# Patient Record
Sex: FEMALE | Race: WHITE | NOT HISPANIC OR LATINO | Employment: OTHER | URBAN - NONMETROPOLITAN AREA
[De-identification: names, ages, dates, MRNs, and addresses within clinical notes are randomized per-mention and may not be internally consistent; named-entity substitution may affect disease eponyms.]

---

## 2019-05-01 ENCOUNTER — OFFICE VISIT (OUTPATIENT)
Dept: PHARMACY | Facility: HOSPITAL | Age: 59
End: 2019-05-01

## 2019-05-01 VITALS
WEIGHT: 124 LBS | HEIGHT: 67 IN | OXYGEN SATURATION: 95 % | DIASTOLIC BLOOD PRESSURE: 67 MMHG | HEART RATE: 77 BPM | SYSTOLIC BLOOD PRESSURE: 98 MMHG | BODY MASS INDEX: 19.46 KG/M2

## 2019-05-01 DIAGNOSIS — I85.11 SECONDARY ESOPHAGEAL VARICES WITH BLEEDING (HCC): ICD-10-CM

## 2019-05-01 DIAGNOSIS — B18.2 CHRONIC HEPATITIS C WITHOUT HEPATIC COMA (HCC): Primary | ICD-10-CM

## 2019-05-01 DIAGNOSIS — K44.9 HIATAL HERNIA: ICD-10-CM

## 2019-05-01 LAB
ALBUMIN SERPL-MCNC: 4.2 G/DL (ref 3.5–5.2)
ALBUMIN/GLOB SERPL: 1 G/DL
ALP SERPL-CCNC: 109 U/L (ref 39–117)
ALPHA-FETOPROTEIN: 4.15 NG/ML (ref 0–8.3)
ALT SERPL W P-5'-P-CCNC: 27 U/L (ref 1–33)
ANION GAP SERPL CALCULATED.3IONS-SCNC: 10.6 MMOL/L
AST SERPL-CCNC: 36 U/L (ref 1–32)
BILIRUB SERPL-MCNC: 0.4 MG/DL (ref 0.2–1.2)
BUN BLD-MCNC: 8 MG/DL (ref 8–23)
BUN/CREAT SERPL: 15.1 (ref 7–25)
CALCIUM SPEC-SCNC: 9.5 MG/DL (ref 8.6–10.5)
CHLORIDE SERPL-SCNC: 103 MMOL/L (ref 98–107)
CO2 SERPL-SCNC: 25.4 MMOL/L (ref 22–29)
CREAT BLD-MCNC: 0.53 MG/DL (ref 0.57–1)
DEPRECATED RDW RBC AUTO: 50 FL (ref 37–54)
ERYTHROCYTE [DISTWIDTH] IN BLOOD BY AUTOMATED COUNT: 13.2 % (ref 12.3–15.4)
GFR SERPL CREATININE-BSD FRML MDRD: 117 ML/MIN/1.73
GLOBULIN UR ELPH-MCNC: 4.1 GM/DL
GLUCOSE BLD-MCNC: 88 MG/DL (ref 65–99)
HBV SURFACE AB SER RIA-ACNC: NORMAL
HCT VFR BLD AUTO: 47.1 % (ref 34–46.6)
HGB BLD-MCNC: 15.1 G/DL (ref 12–15.9)
HIV1+2 AB SER QL: NORMAL
INR PPP: 1.13 (ref 0.9–1.1)
MCH RBC QN AUTO: 32.8 PG (ref 26.6–33)
MCHC RBC AUTO-ENTMCNC: 32.1 G/DL (ref 31.5–35.7)
MCV RBC AUTO: 102.2 FL (ref 79–97)
PLATELET # BLD AUTO: 146 10*3/MM3 (ref 140–450)
PMV BLD AUTO: 10.1 FL (ref 6–12)
POTASSIUM BLD-SCNC: 3.7 MMOL/L (ref 3.5–5.2)
PROT SERPL-MCNC: 8.3 G/DL (ref 6–8.5)
PROTHROMBIN TIME: 14.7 SECONDS (ref 11–15.4)
RBC # BLD AUTO: 4.61 10*6/MM3 (ref 3.77–5.28)
SODIUM BLD-SCNC: 139 MMOL/L (ref 136–145)
WBC NRBC COR # BLD: 6.49 10*3/MM3 (ref 3.4–10.8)

## 2019-05-01 PROCEDURE — 82105 ALPHA-FETOPROTEIN SERUM: CPT

## 2019-05-01 PROCEDURE — 86704 HEP B CORE ANTIBODY TOTAL: CPT

## 2019-05-01 PROCEDURE — 99204 OFFICE O/P NEW MOD 45 MIN: CPT | Performed by: PHYSICIAN ASSISTANT

## 2019-05-01 PROCEDURE — 85027 COMPLETE CBC AUTOMATED: CPT

## 2019-05-01 PROCEDURE — 85610 PROTHROMBIN TIME: CPT

## 2019-05-01 PROCEDURE — 87522 HEPATITIS C REVRS TRNSCRPJ: CPT

## 2019-05-01 PROCEDURE — 86708 HEPATITIS A ANTIBODY: CPT

## 2019-05-01 PROCEDURE — 80053 COMPREHEN METABOLIC PANEL: CPT

## 2019-05-01 PROCEDURE — 86706 HEP B SURFACE ANTIBODY: CPT

## 2019-05-01 PROCEDURE — 36415 COLL VENOUS BLD VENIPUNCTURE: CPT

## 2019-05-01 PROCEDURE — G0432 EIA HIV-1/HIV-2 SCREEN: HCPCS

## 2019-05-01 RX ORDER — LISINOPRIL AND HYDROCHLOROTHIAZIDE 20; 12.5 MG/1; MG/1
1 TABLET ORAL DAILY
COMMUNITY

## 2019-05-01 RX ORDER — BENZONATATE 100 MG/1
100 CAPSULE ORAL 3 TIMES DAILY PRN
COMMUNITY
End: 2020-11-10

## 2019-05-01 RX ORDER — BUDESONIDE AND FORMOTEROL FUMARATE DIHYDRATE 160; 4.5 UG/1; UG/1
2 AEROSOL RESPIRATORY (INHALATION)
COMMUNITY
End: 2020-11-10
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
## 2019-05-02 LAB
HAV AB SER QL IA: NEGATIVE
HBV CORE AB SER DONR QL IA: NEGATIVE

## 2019-05-03 LAB
HCV RNA SERPL NAA+PROBE-ACNC: NORMAL IU/ML
TEST INFORMATION: NORMAL

## 2019-05-05 LAB
HCV GENTYP SERPL NAA+PROBE: NORMAL
HCV RNA SERPL NAA+PROBE-ACNC: NORMAL IU/ML
HCV RNA SERPL NAA+PROBE-LOG IU: NORMAL LOG10 IU/ML
REF LAB TEST REF RANGE: NORMAL

## 2019-05-08 ENCOUNTER — OFFICE VISIT (OUTPATIENT)
Dept: PHARMACY | Facility: HOSPITAL | Age: 59
End: 2019-05-08

## 2019-05-08 ENCOUNTER — HOSPITAL ENCOUNTER (OUTPATIENT)
Dept: ULTRASOUND IMAGING | Facility: HOSPITAL | Age: 59
Discharge: HOME | End: 2019-05-08
Admitting: PHYSICIAN ASSISTANT

## 2019-05-08 VITALS
DIASTOLIC BLOOD PRESSURE: 73 MMHG | HEIGHT: 67 IN | SYSTOLIC BLOOD PRESSURE: 109 MMHG | BODY MASS INDEX: 19.46 KG/M2 | HEART RATE: 73 BPM | WEIGHT: 124 LBS | OXYGEN SATURATION: 92 %

## 2019-05-08 DIAGNOSIS — Z86.19 HISTORY OF HEPATITIS C: Primary | ICD-10-CM

## 2019-05-08 DIAGNOSIS — I85.11 SECONDARY ESOPHAGEAL VARICES WITH BLEEDING (HCC): ICD-10-CM

## 2019-05-08 DIAGNOSIS — Z87.19 HISTORY OF ESOPHAGEAL VARICES: ICD-10-CM

## 2019-05-08 DIAGNOSIS — Z87.19 HISTORY OF CIRRHOSIS OF LIVER: ICD-10-CM

## 2019-05-08 DIAGNOSIS — B18.2 CHRONIC HEPATITIS C WITHOUT HEPATIC COMA (HCC): ICD-10-CM

## 2019-05-08 PROCEDURE — 99214 OFFICE O/P EST MOD 30 MIN: CPT | Performed by: PHYSICIAN ASSISTANT

## 2019-05-08 PROCEDURE — 76705 ECHO EXAM OF ABDOMEN: CPT

## 2019-05-08 PROCEDURE — 76705 ECHO EXAM OF ABDOMEN: CPT | Performed by: RADIOLOGY

## 2019-08-07 ENCOUNTER — LAB (OUTPATIENT)
Dept: PHARMACY | Facility: HOSPITAL | Age: 59
End: 2019-08-07

## 2019-08-07 DIAGNOSIS — B18.2 CHRONIC HEPATITIS C WITH HEPATIC COMA (HCC): Primary | ICD-10-CM

## 2019-08-07 LAB
ALBUMIN SERPL-MCNC: 4.1 G/DL (ref 3.5–5.2)
ALBUMIN/GLOB SERPL: 1.2 G/DL
ALP SERPL-CCNC: 124 U/L (ref 39–117)
ALT SERPL W P-5'-P-CCNC: 25 U/L (ref 1–33)
ANION GAP SERPL CALCULATED.3IONS-SCNC: 13.5 MMOL/L (ref 5–15)
AST SERPL-CCNC: 33 U/L (ref 1–32)
BILIRUB SERPL-MCNC: 0.9 MG/DL (ref 0.2–1.2)
BUN BLD-MCNC: 10 MG/DL (ref 8–23)
BUN/CREAT SERPL: 16.7 (ref 7–25)
CALCIUM SPEC-SCNC: 9.9 MG/DL (ref 8.6–10.5)
CHLORIDE SERPL-SCNC: 103 MMOL/L (ref 98–107)
CO2 SERPL-SCNC: 22.5 MMOL/L (ref 22–29)
CREAT BLD-MCNC: 0.6 MG/DL (ref 0.57–1)
GFR SERPL CREATININE-BSD FRML MDRD: 102 ML/MIN/1.73
GLOBULIN UR ELPH-MCNC: 3.5 GM/DL
GLUCOSE BLD-MCNC: 85 MG/DL (ref 65–99)
POTASSIUM BLD-SCNC: 3.7 MMOL/L (ref 3.5–5.2)
PROT SERPL-MCNC: 7.6 G/DL (ref 6–8.5)
SODIUM BLD-SCNC: 139 MMOL/L (ref 136–145)

## 2019-08-07 PROCEDURE — 87522 HEPATITIS C REVRS TRNSCRPJ: CPT

## 2019-08-07 PROCEDURE — 36415 COLL VENOUS BLD VENIPUNCTURE: CPT

## 2019-08-07 PROCEDURE — 80053 COMPREHEN METABOLIC PANEL: CPT

## 2019-08-09 LAB
HCV RNA SERPL NAA+PROBE-ACNC: NORMAL IU/ML
TEST INFORMATION: NORMAL

## 2019-08-21 ENCOUNTER — OFFICE VISIT (OUTPATIENT)
Dept: PHARMACY | Facility: HOSPITAL | Age: 59
End: 2019-08-21

## 2019-08-21 VITALS
OXYGEN SATURATION: 92 % | HEART RATE: 84 BPM | HEIGHT: 67 IN | DIASTOLIC BLOOD PRESSURE: 73 MMHG | BODY MASS INDEX: 20.4 KG/M2 | SYSTOLIC BLOOD PRESSURE: 120 MMHG | WEIGHT: 130 LBS

## 2019-08-21 DIAGNOSIS — Z23 NEED FOR IMMUNIZATION AGAINST VIRAL HEPATITIS: ICD-10-CM

## 2019-08-21 DIAGNOSIS — K74.60 CIRRHOSIS OF LIVER WITHOUT ASCITES, UNSPECIFIED HEPATIC CIRRHOSIS TYPE (HCC): ICD-10-CM

## 2019-08-21 DIAGNOSIS — Z86.19 HISTORY OF HEPATITIS C: Primary | ICD-10-CM

## 2019-08-21 PROCEDURE — 99213 OFFICE O/P EST LOW 20 MIN: CPT | Performed by: PHYSICIAN ASSISTANT

## 2019-08-21 RX ORDER — OMEPRAZOLE 40 MG/1
40 CAPSULE, DELAYED RELEASE ORAL DAILY
COMMUNITY
End: 2020-11-10
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
## 2020-11-10 ENCOUNTER — OFFICE VISIT (OUTPATIENT)
Dept: SURGERY | Facility: CLINIC | Age: 60
End: 2020-11-10

## 2020-11-10 ENCOUNTER — LAB (OUTPATIENT)
Dept: LAB | Facility: HOSPITAL | Age: 60
End: 2020-11-10

## 2020-11-10 VITALS
DIASTOLIC BLOOD PRESSURE: 73 MMHG | SYSTOLIC BLOOD PRESSURE: 112 MMHG | HEIGHT: 67 IN | BODY MASS INDEX: 21.66 KG/M2 | WEIGHT: 138 LBS | HEART RATE: 79 BPM

## 2020-11-10 DIAGNOSIS — R10.9 ABDOMINAL PAIN, UNSPECIFIED ABDOMINAL LOCATION: Primary | ICD-10-CM

## 2020-11-10 DIAGNOSIS — R10.9 ABDOMINAL PAIN, UNSPECIFIED ABDOMINAL LOCATION: ICD-10-CM

## 2020-11-10 PROCEDURE — 99243 OFF/OP CNSLTJ NEW/EST LOW 30: CPT | Performed by: SURGERY

## 2020-11-10 PROCEDURE — 36415 COLL VENOUS BLD VENIPUNCTURE: CPT

## 2020-11-10 PROCEDURE — 86677 HELICOBACTER PYLORI ANTIBODY: CPT

## 2020-11-10 RX ORDER — CYCLOBENZAPRINE HCL 5 MG
5 TABLET ORAL 3 TIMES DAILY PRN
COMMUNITY

## 2020-11-10 RX ORDER — FAMOTIDINE 40 MG/1
40 TABLET, FILM COATED ORAL DAILY
COMMUNITY

## 2020-11-10 RX ORDER — AMOXICILLIN 250 MG
2 CAPSULE ORAL DAILY PRN
Qty: 30 TABLET | Refills: 1 | Status: SHIPPED | OUTPATIENT
Start: 2020-11-10 | End: 2021-11-10

## 2020-11-10 RX ORDER — ERGOCALCIFEROL 1.25 MG/1
50000 CAPSULE ORAL WEEKLY
COMMUNITY

## 2020-11-10 RX ORDER — PANTOPRAZOLE SODIUM 40 MG/1
40 TABLET, DELAYED RELEASE ORAL DAILY
Qty: 30 TABLET | Refills: 1 | Status: SHIPPED | OUTPATIENT
Start: 2020-11-10 | End: 2021-11-10

## 2020-11-10 NOTE — PROGRESS NOTES
Subjective   Noemy Brooks is a 60 y.o. female is being seen for consultation today at the request of Darlene Merlos APRN    Noemy Brooks is a 60 y.o. female With history of cirrhosis secondary to Hepatitis C.  She has chronic substernal pain worse after fatty meals.  She has compensated liver failure.  The patient pain is worse with fatty and greasy foods.  Recent excessive NSAID use for headache.  Chronic constipation reported.      Past Medical History:   Diagnosis Date   • Cancer (CMS/HCC)    • COPD (chronic obstructive pulmonary disease) (CMS/HCC)    • Hypertension    • Infectious viral hepatitis    • Ovarian cancer (CMS/HCC)        Family History   Problem Relation Age of Onset   • COPD Mother    • Throat cancer Mother    • Liver cancer Father        Social History     Socioeconomic History   • Marital status:      Spouse name: Not on file   • Number of children: 3   • Years of education: Not on file   • Highest education level: Not on file   Tobacco Use   • Smoking status: Former Smoker     Quit date: 2015     Years since quittin.5   • Smokeless tobacco: Never Used   • Tobacco comment: smoked for approx 20 years, 1.5 to 2 ppd   Substance and Sexual Activity   • Alcohol use: No     Frequency: Never     Comment: she previously drank daily for 16 years, beer   • Drug use: No   • Sexual activity: Defer       Past Surgical History:   Procedure Laterality Date   • COLONOSCOPY     • HYSTERECTOMY     • UPPER GASTROINTESTINAL ENDOSCOPY         Review of Systems   Constitutional: Negative for activity change, appetite change, chills and fever.   HENT: Negative for sore throat and trouble swallowing.    Eyes: Negative for visual disturbance.   Respiratory: Negative for cough and shortness of breath.    Cardiovascular: Negative for chest pain and palpitations.   Gastrointestinal: Positive for abdominal pain. Negative for abdominal distention, blood in stool, constipation, diarrhea, nausea and vomiting.  "  Endocrine: Negative for cold intolerance and heat intolerance.   Genitourinary: Negative for dysuria.   Musculoskeletal: Negative for joint swelling.   Skin: Negative for color change, rash and wound.   Allergic/Immunologic: Negative for immunocompromised state.   Neurological: Positive for headaches. Negative for dizziness, seizures and weakness.   Hematological: Negative for adenopathy. Does not bruise/bleed easily.   Psychiatric/Behavioral: Negative for agitation and confusion.         /73   Pulse 79   Ht 170.2 cm (67.01\")   Wt 62.6 kg (138 lb)   BMI 21.61 kg/m²   Objective   Physical Exam  Constitutional:       Appearance: She is well-developed.   HENT:      Head: Normocephalic and atraumatic.   Eyes:      Conjunctiva/sclera: Conjunctivae normal.      Pupils: Pupils are equal, round, and reactive to light.   Neck:      Musculoskeletal: Neck supple.      Thyroid: No thyromegaly.      Vascular: No JVD.      Trachea: No tracheal deviation.   Cardiovascular:      Rate and Rhythm: Normal rate and regular rhythm.      Heart sounds: No murmur. No friction rub. No gallop.    Pulmonary:      Effort: Pulmonary effort is normal.      Breath sounds: Normal breath sounds.   Abdominal:      General: There is no distension.      Palpations: Abdomen is soft. There is no hepatomegaly or splenomegaly.      Tenderness: There is no abdominal tenderness.      Hernia: No hernia is present.   Musculoskeletal: Normal range of motion.         General: No deformity.   Skin:     General: Skin is warm and dry.   Neurological:      Mental Status: She is alert and oriented to person, place, and time.               Assessment   Diagnoses and all orders for this visit:    1. Abdominal pain, unspecified abdominal location (Primary)  -     Helicobacter Pylori, IgA IgG IgM; Future    Other orders  -     SCANNED - IMAGING  -     pantoprazole (Protonix) 40 MG EC tablet; Take 1 tablet by mouth Daily.  Dispense: 30 tablet; Refill: 1  -    "  sennosides-docusate (PERICOLACE) 8.6-50 MG per tablet; Take 2 tablets by mouth Daily As Needed for Constipation.  Dispense: 30 tablet; Refill: 1      Noemy Brooks is a 60 y.o. female with abdominal pain and biliary dyskinesia.  Patient will initiate PPI therapy and be evaluated for H. Pylori.  She will also begin stool softeners.  Due to underlying cirrhosis elective cholecystectomy will be avoided for now.  She is aware of symptoms that should prompt return to care.  She will also limit NSAID use.    Patient's Body mass index is 21.61 kg/m². BMI is within normal parameters. No follow-up required..

## 2020-11-11 LAB
H PYLORI IGA SER-ACNC: <9 UNITS (ref 0–8.9)
H PYLORI IGG SER IA-ACNC: 1.16 INDEX VALUE (ref 0–0.79)
H PYLORI IGM SER-ACNC: <9 UNITS (ref 0–8.9)

## 2023-03-30 ENCOUNTER — TELEPHONE (OUTPATIENT)
Dept: ONCOLOGY | Facility: CLINIC | Age: 63
End: 2023-03-30

## 2023-03-30 NOTE — TELEPHONE ENCOUNTER
Caller: Papo Brooks    Relationship: Self    Best call back number:[phone]    What is the best time to reach you:ANYTIME. LEAVE VM    Who are you requesting to speak with (clinical staff, provider,  specific staff member): TRANSPORTATION FORMS        What was the call regarding: PATIENT PAPO CALLED TO REQUEST THAT THE OFFICE SIGN A LETTER WITH CONIFRMATION TO HER TRANPORATION SERVICE FOR HER APPT ON April 11TH. SHE SAID THEY NEED A 3 DAY WINDOW OR THEY WON'T BE ABLE TO SCHEDULE HER. PATIENT STATES THAT THE TRANSPORTATION SERVICE IS OR HAS SENT THEM A LETTER TO BE SIGNED. SHE STATED IT SHOULD BE COMING THROUGH FAX. ITS A PRE REGISTRATION.     Do you require a callback: YES

## 2023-04-11 ENCOUNTER — CONSULT (OUTPATIENT)
Dept: ONCOLOGY | Facility: CLINIC | Age: 63
End: 2023-04-11
Payer: MEDICARE

## 2023-04-11 VITALS
RESPIRATION RATE: 18 BRPM | OXYGEN SATURATION: 93 % | HEIGHT: 67 IN | TEMPERATURE: 97.8 F | BODY MASS INDEX: 20.56 KG/M2 | HEART RATE: 71 BPM | SYSTOLIC BLOOD PRESSURE: 156 MMHG | WEIGHT: 131 LBS | DIASTOLIC BLOOD PRESSURE: 79 MMHG

## 2023-04-11 DIAGNOSIS — C22.0 HEPATOCELLULAR CARCINOMA: Primary | ICD-10-CM

## 2023-04-11 PROCEDURE — 99205 OFFICE O/P NEW HI 60 MIN: CPT | Performed by: INTERNAL MEDICINE

## 2023-04-11 PROCEDURE — 1126F AMNT PAIN NOTED NONE PRSNT: CPT | Performed by: INTERNAL MEDICINE

## 2023-04-11 RX ORDER — MELATONIN
1000 DAILY
COMMUNITY

## 2023-04-11 RX ORDER — TRAZODONE HYDROCHLORIDE 100 MG/1
100 TABLET ORAL NIGHTLY
COMMUNITY

## 2023-04-11 RX ORDER — LISINOPRIL 30 MG/1
30 TABLET ORAL DAILY
COMMUNITY

## 2023-04-11 RX ORDER — BUDESONIDE AND FORMOTEROL FUMARATE DIHYDRATE 160; 4.5 UG/1; UG/1
2 AEROSOL RESPIRATORY (INHALATION)
COMMUNITY

## 2023-04-11 RX ORDER — PANTOPRAZOLE SODIUM 40 MG/1
40 TABLET, DELAYED RELEASE ORAL DAILY
COMMUNITY

## 2023-04-11 NOTE — PROGRESS NOTES
Name:  Noemy Brooks  :  1960  Date:  2023     REFERRING PHYSICIAN  Henri Melvin MD    PRIMARY CARE PHYSICIAN  Lynn, Haley Roca DO    REASON FOR CONSULTATION  1. Hepatocellular carcinoma      CHIEF COMPLAINT  None.    Dear Dr. Bailey,    HISTORY OF PRESENT ILLNESS:   I saw Ms. Brooks in initial consultation today in our medical oncology clinic. As you are aware, she is a pleasant, 63 y.o., white female with a history of hypertension, hepatitis C (treated with antivirals and reportedly cured in ~), cirrhosis and hepatocellular carcinoma who was initially diagnosed with the latter in ~2022. [place], [place]. Her [place] gastroenterologist referred her to  at that time once she was found to have a couple of liver masses and a serum AFP > 1,000 ng/mL. She was evaluated by the liver transplant service; however, due to the HCC's involvement of the hepatic vessels, she was felt to not be a candidate (for transplant). She was subsequently evaluated by  medical oncology, who recommended starting first-line, palliative treatment with a combination of g3gfieak bevacizumab (Mvasi) and atezolizumab (Tecentriq), per the current standard of care. She received a total of eight (8) cycles between 2022 and early 2023 through the [facility]. Due to transportation issues (her sister has to be the one to drive her, and the trips to [place] have been getting increasingly difficult for her), she now presents to our clinic in order to transfer her ongoing, oncologic care to us, closer to her home in [place], [place].    INTERIM HISTORY:  Ms. Brooks presents to clinic today for initial consultation by herself. She confirmed the above history. She has tolerated the eight (8), c3hlnrhx cycles of bevacizumab/atezolizumab she has received to date overall very well, without any noticeable side effects. She states that the Lord cured her once before (of Hepatitis C), and she has recently become  convinced that he has/will do it again (this time for her HCC). Consequently, she is not entirely certain if she is agreeable to continuing this treatment regimen. She has no current complaints, including pain.    Past Medical History:   Diagnosis Date   • Acid reflux    • Cancer    • Cirrhosis    • COPD (chronic obstructive pulmonary disease)    • Depression    • High cholesterol    • Hypertension    • Infectious viral hepatitis    • Ovarian cancer        Past Surgical History:   Procedure Laterality Date   • APPENDECTOMY N/A    • COLONOSCOPY     • HYSTERECTOMY     • UPPER GASTROINTESTINAL ENDOSCOPY         Social History     Socioeconomic History   • Marital status:    • Number of children: 3   Tobacco Use   • Smoking status: Former     Types: Cigarettes     Quit date: 2015     Years since quittin.9   • Smokeless tobacco: Never   • Tobacco comments:     smoked for approx 20 years, 1.5 to 2 ppd   Vaping Use   • Vaping Use: Never used   Substance and Sexual Activity   • Alcohol use: No     Comment: she previously drank daily for 16 years, beer   • Drug use: No   • Sexual activity: Defer       Family History   Problem Relation Age of Onset   • Stroke Mother    • Hypertension Mother    • COPD Mother    • Throat cancer Mother    • Arthritis Mother    • Asthma Mother    • Heart attack Mother    • Stroke Father    • Cancer Father    • Liver cancer Father        Allergies   Allergen Reactions   • Codeine GI Intolerance       Current Outpatient Medications   Medication Sig Dispense Refill   • budesonide-formoterol (SYMBICORT) 160-4.5 MCG/ACT inhaler Inhale 2 puffs 2 (Two) Times a Day.     • Cholecalciferol 25 MCG (1000 UT) tablet Take 1 tablet by mouth Daily.     • diphenhydrAMINE 12.5 MG/5ML elixir 20 mL, aluminum-magnesium hydroxide-simethicone 400-400-40 MG/5ML suspension 20 mL, Lidocaine Viscous HCl 2 % solution 20 mL Swish and spit 15 mL Every 4 (Four) Hours As Needed.     • lisinopril  "(PRINIVIL,ZESTRIL) 30 MG tablet Take 1 tablet by mouth Daily.     • pantoprazole (PROTONIX) 40 MG EC tablet Take 1 tablet by mouth Daily.     • traZODone (DESYREL) 100 MG tablet Take 1 tablet by mouth Every Night.     • cyclobenzaprine (FLEXERIL) 5 MG tablet Take 5 mg by mouth 3 (Three) Times a Day As Needed for Muscle Spasms. (Patient not taking: Reported on 4/11/2023)     • famotidine (PEPCID) 40 MG tablet Take 40 mg by mouth Daily. (Patient not taking: Reported on 4/11/2023)     • lisinopril-hydrochlorothiazide (PRINZIDE,ZESTORETIC) 20-12.5 MG per tablet Take 1 tablet by mouth Daily. (Patient not taking: Reported on 4/11/2023)     • vitamin D (ERGOCALCIFEROL) 1.25 MG (68136 UT) capsule capsule Take 50,000 Units by mouth 1 (One) Time Per Week. (Patient not taking: Reported on 4/11/2023)       No current facility-administered medications for this visit.     REVIEW OF SYSTEMS  CONSTITUTIONAL:  No fever, chills, night sweats or fatigue.  EYES:  No blurry vision, diplopia or other vision changes.  ENT:  No hearing loss, nosebleeds or sore throat.  CARDIOVASCULAR:  No palpitations, arrhythmia, syncopal episodes or edema.  PULMONARY:  No hemoptysis, wheezing, chronic cough or shortness of breath.  GASTROINTESTINAL:  No nausea or vomiting.  No constipation or diarrhea.  No abdominal pain.  GENITOURINARY:  No hematuria, kidney stones or frequent urination.  MUSCULOSKELETAL:  No joint or back pains.  INTEGUMENTARY: No rashes or pruritus.  ENDOCRINE:  No excessive thirst or hot flashes.  HEMATOLOGIC:  No history of free bleeding, spontaneous bleeding or clotting.  IMMUNOLOGIC:  No allergies or frequent infections.  NEUROLOGIC: No numbness, tingling, seizures or weakness.  PSYCHIATRIC:  No anxiety or depression.    PHYSICAL EXAMINATION  /79   Pulse 71   Temp 97.8 °F (36.6 °C) (Temporal)   Resp 18   Ht 170.2 cm (67\")   Wt 59.4 kg (131 lb)   SpO2 93%   BMI 20.52 kg/m²     Pain Score:  Pain Score    04/11/23 1325 "   PainSc: 0-No pain       PHQ-Score Total:  PHQ-9 Total Score:      ECO  GENERAL:  A well-developed, well-nourished, thin, white female in no acute distress.  HEENT:  Pupils equally round and reactive to light. Extraocular muscles intact.  CARDIOVASCULAR:  Regular rate and rhythm. No murmurs, gallops or rubs.  LUNGS:  Clear to auscultation bilaterally.  ABDOMEN:  Soft, nontender, nondistended with positive bowel sounds.  EXTREMITIES:  No clubbing, cyanosis or edema bilaterally.  SKIN:  No rashes or petechiae.  NEURO:  Cranial nerves grossly intact. No focal deficits.  PSYCH:  Alert and oriented x3.    LABORATORY  Lab Results   Component Value Date    WBC 6.68 2023    HGB 15.9 (H) 2023    HCT 45.7 (H) 2023    MCV 94 2023     (L) 2023    NEUTROABS 3.48 2023       Lab Results   Component Value Date     2019    K 3.7 2019     2019    CO2 22.5 2019    BUN 10 2019    CREATININE 0.60 2019    GLUCOSE 85 2019    CALCIUM 9.9 2019    AST 33 (H) 2019    ALT 25 2019    ALKPHOS 124 (H) 2019    BILITOT 0.9 2019    PROTEINTOT 7.6 2019    ALBUMIN 4.10 2019     CBC (2023): WBCs: 6.68; HgB: 15.9; Hct: 45.7; platelets: 116; MCV: 94    AFP (2023): 2.6 ng/mL  AFP (10/31/2022): 1031.0 ng/mL    IMAGING  CT liver (10/21/2022, at ):  Impression: LR 5 segment 7 lesion measuring up to 2.8 x 2.5 cm with associated TIV (tumor in vein). LR 3 legion measuring 8 mm in segment 4A.    CT chest, abdomen and pelvis with contrast (2023, at ):  Impression:  Chest: No convincing metastatic disease in the thorax.  Abdomen/Pelvis: Within the limits of the study, the area of washout representing the known HCC is stable to smaller in size. The component representing the tumor in vein is smaller in size. No new liver lesions. No distant metastatic disease.    PATHOLOGY    IMPRESSION AND PLAN  Ms.  "Todd is a 63 y.o., white female with:  1. Hepatocellular carcinoma: Initially diagnosed in Fall 2022 after a CT of the liver (performed on 10/21/2022 at , summarized above) confirmed the presence of two lesions (one measuring 2.8 x 2.5 cm in segment 7 and a probable satellite measuring ~8 mm in segment 4A) in the setting of known, baseline cirrhosis and a serum AFP level of > 1000 ng/mL. Due to venous involvement by the tumor, she was/is not a candidate for a liver transplant.  medical oncology therefore recommended initiating first-line, palliative, systemic treatment with a combination of w1zemfdi bevacizumab (the Avastin biosimilar Mvasi) and z7mjvjes atezolizumab (Tecentriq). She received a total of eight (8), c6dfqswg cycles through the [facility] between Fall 2022 and early April 2023 (the eighth and most recent cycle was given on 04/03/2023). She reports today that she tolerated all eight of these cycles overall very well, without any noticeable side effects; however, she also states that she is not certain if she wishes to simply resume taking this regimen through our clinic. While she wishes to at least start following with our clinic for routine monitoring of this issue (as the trips to [place] have been getting increasingly difficult on her sister, and she is the person that has to bring her to her appointments/treatments), she is now convinced that \"the Lord healed her before\" (of hepatitis C) and \"he can do it again\" (of her HCC). For now, she is at least agreeable to undergoing an MRI of the abdomen (liver protocol) to assess the current status of her tumor(s). We will see her back in clinic next week with the results of this study, at which time we will finalize our management plan.  2. Cirrhosis: Likely secondary to a longstanding history of both issue #3 (which was diagnosed and reportedly cured in ~2018, probably decades after she initially contracted it through her ) and " "alcohol abuse (she drank \"a lot\" of beer every day for ~twenty years but quit in the ). Currently compensated. Ongoing management per gastroenterology/hepatology in [place].  3. Hepatitis C: Reportedly contracted through her  (who ultimately  from it) without her knowledge, but also reportedly diagnosed and cured with a months-long course of antivirals in ~2018. Ongoing management per gastroenterology/hepatology in [place].  The patient was in agreement with these plans.    It is a pleasure to participate in Ms. Brooks's care. Please do not hesitate to call with any questions or concerns that you may have.    A total of 60 minutes were spent coordinating this patient’s care in clinic today; more than 50% of this time was face-to-face with the patient, reviewing her medical history, discussing the diagnosis and, in general terms, its prognosis and counseling on the current evaluation and followup plan. All questions were answered to his satisfaction.    FOLLOW UP  Return to our clinic next week with an MRI of the abdomen (liver protocol) with and without contrast.            This document was electronically signed by NADEGE Boss MD 2023 13:34 EDT      CC: DO Henri Davis MD Hao Zhonglin, MD    "

## 2023-04-12 ENCOUNTER — TELEPHONE (OUTPATIENT)
Dept: ONCOLOGY | Facility: CLINIC | Age: 63
End: 2023-04-12
Payer: MEDICARE

## 2023-04-12 ENCOUNTER — TRANSCRIBE ORDERS (OUTPATIENT)
Dept: ADMINISTRATIVE | Facility: HOSPITAL | Age: 63
End: 2023-04-12
Payer: MEDICARE

## 2023-04-12 DIAGNOSIS — C22.0: Primary | ICD-10-CM

## 2023-04-12 NOTE — TELEPHONE ENCOUNTER
Spoke with Ariane. Inquired about the issue with the order placed yesterday for the patients imaging. Ariane stated that the order was correct and there was nothing that needed to be done with the order.

## 2023-04-12 NOTE — TELEPHONE ENCOUNTER
Caller: ERIKA    Relationship: Other    Best call back number: [phone]    What is the best time to reach you: ANYTIME    Who are you requesting to speak with (clinical staff, provider, specific staff member): DR GURROLA OR NURSE    What was the call regarding: ERIKA STATED PT IS SCHEDULED FOR AN MRI ABDOMEN WITH AND WITHOUT CONTRAST TOMORROW. SHE NEEDS THE ORDER PUT IN THE PTS CHART ASAP - NO LATER THAN 2 PM TODAY OR IT WILL HAVE TO BE R/S.     PLEASE CALL TO ADVISE THIS WAS COMPLETED.     Do you require a callback: YES.

## 2023-04-13 ENCOUNTER — HOSPITAL ENCOUNTER (OUTPATIENT)
Dept: MRI IMAGING | Facility: HOSPITAL | Age: 63
Discharge: HOME | End: 2023-04-13
Admitting: INTERNAL MEDICINE
Payer: MEDICARE

## 2023-04-13 DIAGNOSIS — C22.0: ICD-10-CM

## 2023-04-13 LAB — CREAT BLDA-MCNC: 0.5 MG/DL (ref 0.6–1.3)

## 2023-04-13 PROCEDURE — 74183 MRI ABD W/O CNTR FLWD CNTR: CPT | Performed by: RADIOLOGY

## 2023-04-13 PROCEDURE — 82565 ASSAY OF CREATININE: CPT

## 2023-04-13 PROCEDURE — 74183 MRI ABD W/O CNTR FLWD CNTR: CPT

## 2023-04-13 PROCEDURE — A9577 INJ MULTIHANCE: HCPCS | Performed by: INTERNAL MEDICINE

## 2023-04-13 PROCEDURE — 0 GADOBENATE DIMEGLUMINE 529 MG/ML SOLUTION: Performed by: INTERNAL MEDICINE

## 2023-04-13 RX ADMIN — GADOBENATE DIMEGLUMINE 12 ML: 529 INJECTION, SOLUTION INTRAVENOUS at 10:44

## 2023-04-18 ENCOUNTER — TRANSCRIBE ORDERS (OUTPATIENT)
Dept: ADMINISTRATIVE | Facility: HOSPITAL | Age: 63
End: 2023-04-18
Payer: MEDICARE

## 2023-04-18 ENCOUNTER — OFFICE VISIT (OUTPATIENT)
Dept: ONCOLOGY | Facility: CLINIC | Age: 63
End: 2023-04-18
Payer: MEDICARE

## 2023-04-18 VITALS
WEIGHT: 129.6 LBS | SYSTOLIC BLOOD PRESSURE: 132 MMHG | HEART RATE: 73 BPM | DIASTOLIC BLOOD PRESSURE: 76 MMHG | OXYGEN SATURATION: 92 % | RESPIRATION RATE: 18 BRPM | BODY MASS INDEX: 20.3 KG/M2 | TEMPERATURE: 97.5 F

## 2023-04-18 DIAGNOSIS — Z79.899 LONG-TERM USE OF HIGH-RISK MEDICATION: ICD-10-CM

## 2023-04-18 DIAGNOSIS — C22.0 HEPATOCELLULAR CARCINOMA: Primary | ICD-10-CM

## 2023-04-18 DIAGNOSIS — R51.9 ACUTE INTRACTABLE HEADACHE, UNSPECIFIED HEADACHE TYPE: Primary | ICD-10-CM

## 2023-04-18 PROCEDURE — 99214 OFFICE O/P EST MOD 30 MIN: CPT | Performed by: INTERNAL MEDICINE

## 2023-04-18 PROCEDURE — 1126F AMNT PAIN NOTED NONE PRSNT: CPT | Performed by: INTERNAL MEDICINE

## 2023-04-18 RX ORDER — SODIUM CHLORIDE 9 MG/ML
250 INJECTION, SOLUTION INTRAVENOUS ONCE
OUTPATIENT
Start: 2023-05-16

## 2023-04-18 RX ORDER — SODIUM CHLORIDE 9 MG/ML
250 INJECTION, SOLUTION INTRAVENOUS ONCE
OUTPATIENT
Start: 2023-06-06

## 2023-04-18 RX ORDER — LEVOTHYROXINE SODIUM 0.03 MG/1
25 TABLET ORAL
COMMUNITY

## 2023-04-18 RX ORDER — SODIUM CHLORIDE 9 MG/ML
250 INJECTION, SOLUTION INTRAVENOUS ONCE
OUTPATIENT
Start: 2023-04-25

## 2023-04-18 NOTE — PROGRESS NOTES
Name:  Noemy Brooks  :  1960  Date:  2023     REFERRING PHYSICIAN  Henri Melvin MD    PRIMARY CARE PHYSICIAN  Lynn, Haley Roca DO    REASON FOR FOLLOWUP  1. Hepatocellular carcinoma      CHIEF COMPLAINT  None.    Dear Dr. Bailey,    HISTORY OF PRESENT ILLNESS:   I saw Ms. Brooks in followup today in our medical oncology clinic. As you are aware, she is a pleasant, 63 y.o., white female with a history of hypertension, hepatitis C (treated with antivirals and reportedly cured in ~), cirrhosis and hepatocellular carcinoma who was initially diagnosed with the latter in ~2022. [place], [place]. Her [place] gastroenterologist referred her to  at that time once she was found to have a couple of liver masses and a serum AFP > 1,000 ng/mL. She was evaluated by the liver transplant service; however, due to the HCC's involvement of the hepatic vessels, she was felt to not be a candidate (for transplant). She was subsequently evaluated by  medical oncology, who recommended starting first-line, palliative treatment with a combination of w0iyeydv bevacizumab (Mvasi) and atezolizumab (Tecentriq), per the current standard of care. She received a total of eight (8) cycles between 2022 and early 2023 through the [facility]. Due to transportation issues (her sister has to be the one to drive her, and the trips to [place] have been getting increasingly difficult for her), she presented to our clinic in 2023 in order to transfer her ongoing, oncologic care to us, closer to her home in [place], [place].    INTERIM HISTORY:  Ms. Brooks returns to clinic today for follow up accompanied by her sister. She has tolerated the eight (8), l5ckhddf cycles of bevacizumab/atezolizumab she has received to date overall very well, without any noticeable side effects. She previously stated that the Lord cured her once before (of Hepatitis C), and she has recently become convinced that he  has/will do it again (this time for her HCC). She again has no current complaints, including pain.    Past Medical History:   Diagnosis Date   • Acid reflux    • Cancer    • Cirrhosis    • COPD (chronic obstructive pulmonary disease)    • Depression    • High cholesterol    • Hypertension    • Infectious viral hepatitis    • Ovarian cancer        Past Surgical History:   Procedure Laterality Date   • APPENDECTOMY N/A    • COLONOSCOPY     • HYSTERECTOMY     • UPPER GASTROINTESTINAL ENDOSCOPY         Social History     Socioeconomic History   • Marital status:    • Number of children: 3   Tobacco Use   • Smoking status: Former     Types: Cigarettes     Quit date: 2015     Years since quittin.9   • Smokeless tobacco: Never   • Tobacco comments:     smoked for approx 20 years, 1.5 to 2 ppd   Vaping Use   • Vaping Use: Never used   Substance and Sexual Activity   • Alcohol use: No     Comment: she previously drank daily for 16 years, beer   • Drug use: No   • Sexual activity: Defer       Family History   Problem Relation Age of Onset   • Stroke Mother    • Hypertension Mother    • COPD Mother    • Throat cancer Mother    • Arthritis Mother    • Asthma Mother    • Heart attack Mother    • Stroke Father    • Cancer Father    • Liver cancer Father        Allergies   Allergen Reactions   • Codeine GI Intolerance       Current Outpatient Medications   Medication Sig Dispense Refill   • budesonide-formoterol (SYMBICORT) 160-4.5 MCG/ACT inhaler Inhale 2 puffs 2 (Two) Times a Day.     • cholecalciferol (VITAMIN D3) 25 MCG (1000 UT) tablet Take 1 tablet by mouth Daily.     • cyclobenzaprine (FLEXERIL) 5 MG tablet Take 1 tablet by mouth 3 (Three) Times a Day As Needed for Muscle Spasms.     • diphenhydrAMINE 12.5 MG/5ML elixir 20 mL, aluminum-magnesium hydroxide-simethicone 400-400-40 MG/5ML suspension 20 mL, Lidocaine Viscous HCl 2 % solution 20 mL Swish and spit 15 mL Every 4 (Four) Hours As Needed.     •  famotidine (PEPCID) 40 MG tablet Take 1 tablet by mouth Daily.     • levothyroxine (SYNTHROID, LEVOTHROID) 25 MCG tablet Take 1 tablet by mouth Every Morning.     • lisinopril (PRINIVIL,ZESTRIL) 30 MG tablet Take 1 tablet by mouth Daily.     • lisinopril-hydrochlorothiazide (PRINZIDE,ZESTORETIC) 20-12.5 MG per tablet Take 1 tablet by mouth Daily.     • pantoprazole (PROTONIX) 40 MG EC tablet Take 1 tablet by mouth Daily.     • traZODone (DESYREL) 100 MG tablet Take 1 tablet by mouth Every Night.     • vitamin D (ERGOCALCIFEROL) 1.25 MG (08888 UT) capsule capsule Take 1 capsule by mouth 1 (One) Time Per Week.       No current facility-administered medications for this visit.     REVIEW OF SYSTEMS  CONSTITUTIONAL:  No fever, chills, night sweats or fatigue.  EYES:  No blurry vision, diplopia or other vision changes.  ENT:  No hearing loss, nosebleeds or sore throat.  CARDIOVASCULAR:  No palpitations, arrhythmia, syncopal episodes or edema.  PULMONARY:  No hemoptysis, wheezing, chronic cough or shortness of breath.  GASTROINTESTINAL:  No nausea or vomiting.  No constipation or diarrhea.  No abdominal pain.  GENITOURINARY:  No hematuria, kidney stones or frequent urination.  MUSCULOSKELETAL:  No joint or back pains.  INTEGUMENTARY: No rashes or pruritus.  ENDOCRINE:  No excessive thirst or hot flashes.  HEMATOLOGIC:  No history of free bleeding, spontaneous bleeding or clotting.  IMMUNOLOGIC:  No allergies or frequent infections.  NEUROLOGIC: No numbness, tingling, seizures or weakness.  PSYCHIATRIC:  No anxiety or depression.    PHYSICAL EXAMINATION  /76   Pulse 73   Temp 97.5 °F (36.4 °C) (Temporal)   Resp 18   Wt 58.8 kg (129 lb 9.6 oz)   SpO2 92%   BMI 20.30 kg/m²     Pain Score:  Pain Score    23 1034   PainSc: 0-No pain       PHQ-Score Total:  PHQ-9 Total Score:      ECO  GENERAL:  A well-developed, well-nourished, thin, white female in no acute distress.  HEENT:  Pupils equally round and  reactive to light. Extraocular muscles intact.  CARDIOVASCULAR:  Regular rate and rhythm. No murmurs, gallops or rubs.  LUNGS:  Clear to auscultation bilaterally.  ABDOMEN:  Soft, nontender, nondistended with positive bowel sounds.  EXTREMITIES:  No clubbing, cyanosis or edema bilaterally.  SKIN:  No rashes or petechiae.  NEURO:  Cranial nerves grossly intact. No focal deficits.  PSYCH:  Alert and oriented x3.    The physical exam is unchanged from the previous one.    LABORATORY  Lab Results   Component Value Date    WBC 6.68 04/03/2023    HGB 15.9 (H) 04/03/2023    HCT 45.7 (H) 04/03/2023    MCV 94 04/03/2023     (L) 04/03/2023    NEUTROABS 3.48 04/03/2023       Lab Results   Component Value Date     08/07/2019    K 3.7 08/07/2019     08/07/2019    CO2 22.5 08/07/2019    BUN 10 08/07/2019    CREATININE 0.50 (L) 04/13/2023    GLUCOSE 85 08/07/2019    CALCIUM 9.9 08/07/2019    AST 33 (H) 08/07/2019    ALT 25 08/07/2019    ALKPHOS 124 (H) 08/07/2019    BILITOT 0.9 08/07/2019    PROTEINTOT 7.6 08/07/2019    ALBUMIN 4.10 08/07/2019     CBC (04/03/2023): WBCs: 6.68; HgB: 15.9; Hct: 45.7; platelets: 116; MCV: 94    AFP (01/27/2023): 2.6 ng/mL  AFP (10/31/2022): 1031.0 ng/mL    IMAGING  CT liver (10/21/2022, at ):  Impression: LR 5 segment 7 lesion measuring up to 2.8 x 2.5 cm with associated TIV (tumor in vein). LR 3 legion measuring 8 mm in segment 4A.    CT chest, abdomen and pelvis with contrast (01/27/2023, at ):  Impression:  Chest: No convincing metastatic disease in the thorax.  Abdomen/Pelvis: Within the limits of the study, the area of washout representing the known HCC is stable to smaller in size. The component representing the tumor in vein is smaller in size. No new liver lesions. No distant metastatic disease.    MRI abdomen with and without contrast (04/13/2023):  Impression:  1) No solid arterial phase enhancing liver lesions identified. No delayed phase enhancing liver lesions are  noted.  2) Liver cirrhosis and portal hypertension.  3) 0.6 cm simple appearing benign hepatic cyst right lobe.  4) Simple appearing cyst right kidney. No hydronephrosis.  5) Other nonacute findings.    PATHOLOGY    IMPRESSION AND PLAN  Ms. Brooks is a 63 y.o., white female with:  1. Hepatocellular carcinoma: Initially diagnosed in Fall 2022 after a CT of the liver (performed on 10/21/2022 at , summarized above) confirmed the presence of two lesions (one measuring 2.8 x 2.5 cm in segment 7 and a probable satellite measuring ~8 mm in segment 4A) in the setting of known, baseline cirrhosis and a serum AFP level of > 1000 ng/mL. Due to venous involvement by the tumor, she was/is not a candidate for a liver transplant.  medical oncology therefore recommended initiating first-line, palliative, systemic treatment with a combination of o2jijmnb bevacizumab (the Avastin biosimilar Mvasi) and j8ehepue atezolizumab (Tecentriq). She received a total of eight (8), m2xvedbs cycles through the [facility] between Fall 2022 and early April 2023 (the eighth and most recent cycle was given on 04/03/2023). She reiterates today that she tolerated all eight of these cycles overall very well, without any noticeable side effects. Now, the most recent imaging, an MRI of the abdomen (liver protocol) performed on 04/13/2023 (and summarized above) shows no visible signs of a liver mass; and, meanwhile, with the therapy she has received to date, her serum AFP level has declined from > 1000 ng/mL in late October 2023 to solidly WNL (2.6 ng/mL) as of late January 2023, consistent with a complete response in her disease. I therefore had a long discussion with the patient and her sister in clinic today regarding our treatment recommendations from this point. In short, the potential risks of indefinite Avastin (particularly bleeding; she has an aortic aneurysm in addition to her baseline cirrhosis) now likely outweigh its marginal  "benefit (at least at this time); however, for now at least, at least continuing indefinite, g4ghhead Tecentriq would be the current standard of care. She was agreeable to proceeding (with the Tecentriq) alone, with the next cycle scheduled for next week (). We will see her back in clinic in one month, on the day of the following cycle of Tecentriq by itself, with a CBC, CMP, TSH and AFP for a symptom/toxicity check.  2. Cirrhosis: Likely secondary to a longstanding history of both issue #3 (which was diagnosed and reportedly cured in ~2018, probably decades after she initially contracted it through her ) and alcohol abuse (she drank \"a lot\" of beer every day for ~twenty years but quit in the ). Currently still compensated. Ongoing management per gastroenterology/hepatology in [place].  3. Hepatitis C: Reportedly contracted through her  (who ultimately  from it) without her knowledge, but also reportedly diagnosed and cured with a months-long course of antivirals in ~. Ongoing management per gastroenterology/hepatology in [place].  The patient and her sister were in agreement with these plans.    It is a pleasure to participate in Ms. Brooks's care. Please do not hesitate to call with any questions or concerns that you may have.    A total of 30 minutes were spent coordinating this patient’s care in clinic today; more than 50% of this time was face-to-face with the patient and her sister, reviewing her interim medical history, discussing the results of the recent MRI of the abdomen (liver protocol) and counseling on the current treatment and followup plan. All questions were answered to their satisfaction.    FOLLOW UP  Discontinue palliative Avastin. Proceed with ongoing, indefinite, o7vredmf atezolizumab (with the 9th cycle next week). Return to our clinic in 1 month, on the day of the 10th cycle of atezolizumab, with a CBC, CMP, TSH and AFP.            This document was electronically " signed by NADEGE Boss MD April 18, 2023 11:04 EDT      CC: Haley Bailey, MD Henri Cuellar MD

## 2023-04-19 ENCOUNTER — TELEPHONE (OUTPATIENT)
Dept: ONCOLOGY | Facility: CLINIC | Age: 63
End: 2023-04-19
Payer: MEDICARE

## 2023-04-19 NOTE — TELEPHONE ENCOUNTER
Caller: Papo Brooks    Relationship: Self    Best call back number: [phone]    What is the best time to reach you: ANY    Who are you requesting to speak with (clinical staff, provider,  specific staff member): CLINICAL     What was the call regarding: PAPO REVIEVED A LETTER FROM  REGARDING INFUSIONS,  DR GURROLA STATED AT HER APPT SHE DIDN'T NEED THE INFUSIONS. SHE WOULD LIKE TO TALK TO HIM REGARDING THAT  ALSO SHE WANTED TO GET THE NEW TREATMENTS SCHEDULED THAT HER AND DR GURROLA DISCUSSED    Do you require a callback: YES

## 2023-04-19 NOTE — TELEPHONE ENCOUNTER
Spoke with patient, clarified her treatment schedule with her. Informed that we are waiting on insurance approval of her treatment and we will be in contact with her as soon as we get the approval. Patient verbalized understanding. No other questions or concerns at this time.

## 2023-04-24 NOTE — TELEPHONE ENCOUNTER
Darshan is here today for rocephin treatment for lymes.  He has a fine rash on his shoulders and cheeks.  He also has edema under and beside both eyes , red,slightly rashed cheeks.  Ivss.  He denies difficulty swallowing or feeling of thick tongue, respirations even and unlabored.  On call Dr Óscar Balderas notified.  Diphenhydramine 50 mg iv given.  Rocephin held.  He is to check with Dr Marquis tomorrow.  I left his appointment for tomorrow here for picc line flush and reeval to be determined by Dr Marquis   PAPO IS CALLING TO SEE IF HER INSURANCE HAS APPROVED FOR THE INFUSIONS, SO SHE CAN GET HER APPTS SCHEDULED      PLEASE SEE PREVIOUS MESSAGES AND ADVISE

## 2023-04-25 RX ORDER — MEGESTROL ACETATE 40 MG/ML
400 SUSPENSION ORAL DAILY
Qty: 480 ML | Refills: 2 | Status: SHIPPED | OUTPATIENT
Start: 2023-04-25

## 2023-04-28 ENCOUNTER — TELEPHONE (OUTPATIENT)
Dept: ONCOLOGY | Facility: CLINIC | Age: 63
End: 2023-04-28

## 2023-04-28 NOTE — TELEPHONE ENCOUNTER
Caller: JOE    Best call back number: [phone]    What is the best time to reach you: ANYTIME    Who are you requesting to speak with (clinical staff, provider,  specific staff member): CLINICAL     Do you know the name of the person who called: SYL    What was the call regarding: SYL CALLING FROM [employer] NEEDS ADDITIONAL CLINICAL QUESTIONS TO BE ANSWERED.    CALL TO DISCUSS     Do you require a callback: YES PLEASE

## 2023-04-28 NOTE — TELEPHONE ENCOUNTER
Caller: JOE    Best call back number: [phone]    What is the best time to reach you: ANYTIME    Who are you requesting to speak with (clinical staff, provider,  specific staff member): CLINICAL     Do you know the name of the person who called: TERESA    What was the call regarding: TERESA CALLING FROM [employer] NEEDS ADDITIONAL INFO FOR THE PT INJECTION PRIOR AUTH, NEEDS PT MEMBER ID NUMBER?    CALL TO DISCUSS     Do you require a callback: YES PLEASE

## 2023-05-01 ENCOUNTER — INFUSION (OUTPATIENT)
Dept: ONCOLOGY | Facility: HOSPITAL | Age: 63
End: 2023-05-01
Payer: MEDICARE

## 2023-05-01 ENCOUNTER — APPOINTMENT (OUTPATIENT)
Dept: ONCOLOGY | Facility: HOSPITAL | Age: 63
End: 2023-05-01
Payer: MEDICARE

## 2023-05-01 ENCOUNTER — OFFICE VISIT (OUTPATIENT)
Dept: ONCOLOGY | Facility: CLINIC | Age: 63
End: 2023-05-01
Payer: MEDICARE

## 2023-05-01 VITALS
RESPIRATION RATE: 18 BRPM | SYSTOLIC BLOOD PRESSURE: 139 MMHG | WEIGHT: 129.5 LBS | BODY MASS INDEX: 20.28 KG/M2 | HEART RATE: 74 BPM | DIASTOLIC BLOOD PRESSURE: 82 MMHG | OXYGEN SATURATION: 92 % | TEMPERATURE: 97.8 F

## 2023-05-01 VITALS
HEART RATE: 74 BPM | TEMPERATURE: 97.8 F | RESPIRATION RATE: 18 BRPM | OXYGEN SATURATION: 92 % | SYSTOLIC BLOOD PRESSURE: 139 MMHG | DIASTOLIC BLOOD PRESSURE: 82 MMHG | BODY MASS INDEX: 20.28 KG/M2 | WEIGHT: 129.5 LBS

## 2023-05-01 DIAGNOSIS — C22.0 HEPATOCELLULAR CARCINOMA: Primary | ICD-10-CM

## 2023-05-01 DIAGNOSIS — Z79.899 LONG-TERM USE OF HIGH-RISK MEDICATION: ICD-10-CM

## 2023-05-01 LAB
ALBUMIN SERPL-MCNC: 3.6 G/DL (ref 3.5–5.2)
ALBUMIN/GLOB SERPL: 1 G/DL
ALP SERPL-CCNC: 76 U/L (ref 39–117)
ALPHA-FETOPROTEIN: <2 NG/ML (ref 0–8.3)
ALT SERPL W P-5'-P-CCNC: 36 U/L (ref 1–33)
ANION GAP SERPL CALCULATED.3IONS-SCNC: 12.1 MMOL/L (ref 5–15)
AST SERPL-CCNC: 43 U/L (ref 1–32)
BASOPHILS # BLD AUTO: 0.08 10*3/MM3 (ref 0–0.2)
BASOPHILS NFR BLD AUTO: 1.2 % (ref 0–1.5)
BILIRUB SERPL-MCNC: 0.6 MG/DL (ref 0–1.2)
BILIRUB UR QL STRIP: NEGATIVE
BUN SERPL-MCNC: 9 MG/DL (ref 8–23)
BUN/CREAT SERPL: 16.4 (ref 7–25)
CALCIUM SPEC-SCNC: 9.9 MG/DL (ref 8.6–10.5)
CHLORIDE SERPL-SCNC: 110 MMOL/L (ref 98–107)
CLARITY UR: ABNORMAL
CO2 SERPL-SCNC: 21.9 MMOL/L (ref 22–29)
COLOR UR: YELLOW
CREAT SERPL-MCNC: 0.55 MG/DL (ref 0.57–1)
DEPRECATED RDW RBC AUTO: 51.5 FL (ref 37–54)
EGFRCR SERPLBLD CKD-EPI 2021: 101.9 ML/MIN/1.73
EOSINOPHIL # BLD AUTO: 0.81 10*3/MM3 (ref 0–0.4)
EOSINOPHIL NFR BLD AUTO: 11.7 % (ref 0.3–6.2)
ERYTHROCYTE [DISTWIDTH] IN BLOOD BY AUTOMATED COUNT: 14.1 % (ref 12.3–15.4)
GLOBULIN UR ELPH-MCNC: 3.6 GM/DL
GLUCOSE SERPL-MCNC: 83 MG/DL (ref 65–99)
GLUCOSE UR STRIP-MCNC: NEGATIVE MG/DL
HCT VFR BLD AUTO: 47.7 % (ref 34–46.6)
HGB BLD-MCNC: 15.9 G/DL (ref 12–15.9)
HGB UR QL STRIP.AUTO: NEGATIVE
IMM GRANULOCYTES # BLD AUTO: 0.01 10*3/MM3 (ref 0–0.05)
IMM GRANULOCYTES NFR BLD AUTO: 0.1 % (ref 0–0.5)
KETONES UR QL STRIP: NEGATIVE
LEUKOCYTE ESTERASE UR QL STRIP.AUTO: ABNORMAL
LYMPHOCYTES # BLD AUTO: 1.47 10*3/MM3 (ref 0.7–3.1)
LYMPHOCYTES NFR BLD AUTO: 21.3 % (ref 19.6–45.3)
MCH RBC QN AUTO: 33.1 PG (ref 26.6–33)
MCHC RBC AUTO-ENTMCNC: 33.3 G/DL (ref 31.5–35.7)
MCV RBC AUTO: 99.4 FL (ref 79–97)
MONOCYTES # BLD AUTO: 0.76 10*3/MM3 (ref 0.1–0.9)
MONOCYTES NFR BLD AUTO: 11 % (ref 5–12)
NEUTROPHILS NFR BLD AUTO: 3.78 10*3/MM3 (ref 1.7–7)
NEUTROPHILS NFR BLD AUTO: 54.7 % (ref 42.7–76)
NITRITE UR QL STRIP: NEGATIVE
NRBC BLD AUTO-RTO: 0 /100 WBC (ref 0–0.2)
PH UR STRIP.AUTO: 7.5 [PH] (ref 5–8)
PLATELET # BLD AUTO: 118 10*3/MM3 (ref 140–450)
PMV BLD AUTO: 9.6 FL (ref 6–12)
POTASSIUM SERPL-SCNC: 3.5 MMOL/L (ref 3.5–5.2)
PROT SERPL-MCNC: 7.2 G/DL (ref 6–8.5)
PROT UR QL STRIP: NEGATIVE
RBC # BLD AUTO: 4.8 10*6/MM3 (ref 3.77–5.28)
SODIUM SERPL-SCNC: 144 MMOL/L (ref 136–145)
SP GR UR STRIP: <=1.005 (ref 1–1.03)
T4 FREE SERPL-MCNC: 1.06 NG/DL (ref 0.93–1.7)
TSH SERPL DL<=0.05 MIU/L-ACNC: 4.54 UIU/ML (ref 0.27–4.2)
UROBILINOGEN UR QL STRIP: ABNORMAL
WBC NRBC COR # BLD: 6.91 10*3/MM3 (ref 3.4–10.8)

## 2023-05-01 PROCEDURE — 81003 URINALYSIS AUTO W/O SCOPE: CPT

## 2023-05-01 PROCEDURE — 84443 ASSAY THYROID STIM HORMONE: CPT

## 2023-05-01 PROCEDURE — 84439 ASSAY OF FREE THYROXINE: CPT

## 2023-05-01 PROCEDURE — 82105 ALPHA-FETOPROTEIN SERUM: CPT

## 2023-05-01 PROCEDURE — 80053 COMPREHEN METABOLIC PANEL: CPT

## 2023-05-01 PROCEDURE — 96413 CHEMO IV INFUSION 1 HR: CPT

## 2023-05-01 PROCEDURE — 85025 COMPLETE CBC W/AUTO DIFF WBC: CPT

## 2023-05-01 PROCEDURE — 25010000002 ATEZOLIZUMAB 1200 MG/20ML SOLUTION 20 ML VIAL: Performed by: INTERNAL MEDICINE

## 2023-05-01 RX ORDER — HEPARIN SODIUM (PORCINE) LOCK FLUSH IV SOLN 100 UNIT/ML 100 UNIT/ML
500 SOLUTION INTRAVENOUS AS NEEDED
Status: DISCONTINUED | OUTPATIENT
Start: 2023-05-01 | End: 2023-05-01 | Stop reason: HOSPADM

## 2023-05-01 RX ORDER — SODIUM CHLORIDE 9 MG/ML
250 INJECTION, SOLUTION INTRAVENOUS ONCE
Status: COMPLETED | OUTPATIENT
Start: 2023-05-01 | End: 2023-05-01

## 2023-05-01 RX ORDER — SODIUM CHLORIDE 0.9 % (FLUSH) 0.9 %
10 SYRINGE (ML) INJECTION AS NEEDED
OUTPATIENT
Start: 2023-05-01

## 2023-05-01 RX ORDER — HEPARIN SODIUM (PORCINE) LOCK FLUSH IV SOLN 100 UNIT/ML 100 UNIT/ML
500 SOLUTION INTRAVENOUS AS NEEDED
OUTPATIENT
Start: 2023-05-01

## 2023-05-01 RX ORDER — SODIUM CHLORIDE 0.9 % (FLUSH) 0.9 %
10 SYRINGE (ML) INJECTION AS NEEDED
Status: DISCONTINUED | OUTPATIENT
Start: 2023-05-01 | End: 2023-05-01 | Stop reason: HOSPADM

## 2023-05-01 RX ADMIN — SODIUM CHLORIDE 250 ML: 9 INJECTION, SOLUTION INTRAVENOUS at 09:49

## 2023-05-01 RX ADMIN — ATEZOLIZUMAB 1200 MG: 1200 INJECTION, SOLUTION INTRAVENOUS at 09:49

## 2023-05-01 NOTE — PROGRESS NOTES
CHEMOTHERAPY PREPARATION    Noemy Brooks  [id number]  1960    Chief Complaint: chemo education     History of present illness:  Noemy Brooks is a 63 y.o. year old female who is here today for chemotherapy preparation and needs assessment. The patient has been diagnosed with hepatocellular carcinoma and is scheduled to begin treatment with d0yqptjn atezolizumab today. At present, she is doing well and denies any specific complaints.     Oncology History:    Oncology/Hematology History   Hepatocellular carcinoma   11/8/2022 -  Chemotherapy    OP HEPATOBILIARY Atezolizumab     4/11/2023 Initial Diagnosis    Hepatocellular carcinoma         Past Medical History:   Diagnosis Date   • Acid reflux    • Cancer    • Cirrhosis    • COPD (chronic obstructive pulmonary disease)    • Depression    • High cholesterol    • Hypertension    • Infectious viral hepatitis    • Ovarian cancer        Past Surgical History:   Procedure Laterality Date   • APPENDECTOMY N/A    • COLONOSCOPY     • HYSTERECTOMY     • UPPER GASTROINTESTINAL ENDOSCOPY         Family History   Problem Relation Age of Onset   • Stroke Mother    • Hypertension Mother    • COPD Mother    • Throat cancer Mother    • Arthritis Mother    • Asthma Mother    • Heart attack Mother    • Stroke Father    • Cancer Father    • Liver cancer Father        Medications: The current medication list was reviewed and reconciled.     Allergies:  is allergic to codeine.    Review of Systems:  A comprehensive 14 point review of systems was performed. Significant findings as mentioned above. All other systems reviewed and are negative.        Physical Exam:    Vitals:    05/01/23 0803   BP: 139/82   Pulse: 74   Resp: 18   Temp: 97.8 °F (36.6 °C)   SpO2: 92%     Vitals:    05/01/23 0803   PainSc: 0-No pain       ECOG: (0) Fully Active - Able to Carry On All Pre-disease Performance Without Restriction  General: Well developed, well nourished. In no acute distress.  HEENT:  "Pupils equally round and reactive to light. Extraocular muscles intact.  Cardiovascular: Regular rate and rhythm. No murmurs, rubs or gallops.  Lungs: Clear to auscultation bilaterally.  Abdomen: Soft, nontender, nondistended. Normoactive bowel sounds x 4 quadrants.  Extremities: No clubbing, cyanosis or edema bilaterally.  Skin: Warm, dry, intact.  Neuro: Grossly non-focal exam.  Psych: Alert and oriented x 3.             NEEDS ASSESSMENTS    Genetics  The patient's new diagnosis and family history have been reviewed for genetic counseling needs. A genetic referral is not recommended.     Barriers to care  A barriers form was also completed by the patient today. We discussed services offered by our facility to help her have adequate access to care. The patient was given the name and card for our Oncology Social Worker, Mare Desir. Based upon barriers assessment today, the patient will not require a follow-up call from the  to further discuss needs.     VAD Assessment  The patient and I discussed planned intervenous chemotherapy as well as other IV treatments that are often needed throughout the course of treatment. These may include, but are not limited to blood transfusions, antibiotics, and IV hydration. The vasculature does appear to be adequate for multiple peripheral IVs throughout their treatment course. Discussed risks and benefits of VADs. The patient would not like to pursue Port-A-Cath insertion prior to initiation of treatment.     Advanced Care Planning  The patient and I discussed advanced care planning, \"Conversations that Matter\".   This service was offered, free of charge, for development of advance directives with a certified ACP facilitator.  The patient does not have an up-to-date advanced directive. This document is not on file with our office. The patient is not interested in an appointment with one of our facilitators to create or update their advanced directives.      Palliative " Care  The patient and I discussed palliative care services. Palliative care is not the same as Hospice care. This is specialized medical care for people living with serious illness with the goal of improving quality of life for the patient and their family. [facility] has partnered with [facility] to offer our patients outpatient palliative care early along with their treatment to assist in coordination of care, symptom management, pain management, and medical decision making.  Oncology criteria for palliative care referral is not met at this time. The patient is not interested in a palliative care consultation.     Additional Referral needs  none      CHEMOTHERAPY EDUCATION    Booklets Given: Chemotherapy and You [x]  Eating Hints [x]    Sexuality/Fertility Books []      Chemotherapy/Biotherapy Education Sheets: (list all that apply)  nausea management, acid reflux management, diarrhea management, Cancer resourse contacts information, skin and mouth care and vaccination information                                                                                                                                                                 Chemotherapy Regimen:   Treatment Plans     Name Type Plan Dates Plan Provider         Active    OP HEPATOBILIARY Atezolizumab ONCOLOGY TREATMENT  11/7/2022 - Present T Nikolas Boss MD                    TOPICS EDUCATION PROVIDED COMMENTS   ANEMIA:  role of RBC, cause, s/s, ways to manage, role of transfusion [x]    THROMBOCYTOPENIA:  role of platelet, cause, s/s, ways to prevent bleeding, things to avoid, when to seek help [x]    NEUTROPENIA:  role of WBC, cause, infection precautions, s/s of infection, when to call MD [x]    NUTRITION & APPETITE CHANGES:  importance of maintaining healthy diet & weight, ways to manage to improve intake, dietary consult, exercise regimen [x]    DIARRHEA:  causes, s/s of dehydration, ways to manage, dietary changes, when to call MD  [x]    CONSTIPATION:  causes, ways to manage, dietary changes, when to call MD [x]    NAUSEA & VOMITING:  cause, use of antiemetics, dietary changes, when to call MD [x]    MOUTH SORES:  causes, oral care, ways to manage [x]    ALOPECIA:  cause, ways to manage, resources [x]    INFERTILITY & SEXUALITY:  causes, fertility preservation options, sexuality changes, ways to manage, importance of birth control [x]    NERVOUS SYSTEM CHANGES:  causes, s/s, neuropathies, cognitive changes, ways to manage [x]    PAIN:  causes, ways to manage [x]    SKIN & NAIL CHANGES:  cause, s/s, ways to manage [x]    ORGAN TOXICITIES:  cause, s/s, need for diagnostic tests, labs, when to notify MD [x]    SURVIVORSHIP:  distress, distress assessment, secondary malignancies, early/late effects, follow-up, social issues, social support [x]    HOME CARE:  use of spill kits, storing of PO chemo, how to manage bodily fluids [x]    MISCELLANEOUS:  drug interactions, administration, vesicant, et [x]        Assessment and Plan:    Diagnoses and all orders for this visit:    1. Hepatocellular carcinoma (Primary)        The patient and I have reviewed their new cancer diagnosis and scheduled treatment plan. Needs assessment was completed including genetics, barriers to care, VAD evaluation, advanced care planning, and palliative care services. Referrals have been ordered as appropriate based upon our evaluation and patient desires.     Chemotherapy teaching was also completed today as documented above. Adequate time was given to answer all questions to her satisfaction. Patient and family are aware of their care team members and contact information if they have questions or problems throughout the treatment course. Needs assessments and education has been completed. The patient is adequately prepared to begin treatment as scheduled.       I advised the patient that she can take Tylenol or Ibuprofen as needed for aches/pains related to  cancer/treatment. I also advised patient she could use Senakot or Miralax as needed for constipation or Imodium as needed for diarrhea.       I reviewed with the patient the care team members. I also reviewed the option of the acute care visits provided through our oncology office for evaluation and management of symptoms related to treatment. Patient was provided with phone number to call during regular office hours [phone] press #1 and for treatment related questions call [phone] to speak to a nurse. If after hours or on the weekend call [phone] and ask to page the MD on call for [facility] services.         I spent 60 minutes with Noemy Brooks today.  In the office today, more than 50% of this time was spent face-to-face with her  in counseling / coordination of care, reviewing her interim medical history and counseling on the current treatment plan.  All questions were answered to her satisfaction.           Electronically Signed by: VIRGEN Calvin , May 1, 2023 09:02 EDT

## 2023-05-22 ENCOUNTER — DOCUMENTATION (OUTPATIENT)
Dept: ONCOLOGY | Facility: HOSPITAL | Age: 63
End: 2023-05-22
Payer: MEDICARE

## 2023-05-22 DIAGNOSIS — C22.0 HEPATOCELLULAR CARCINOMA: Primary | ICD-10-CM

## 2023-05-22 RX ORDER — SODIUM CHLORIDE 9 MG/ML
250 INJECTION, SOLUTION INTRAVENOUS ONCE
OUTPATIENT
Start: 2023-07-05

## 2023-05-22 RX ORDER — SODIUM CHLORIDE 9 MG/ML
250 INJECTION, SOLUTION INTRAVENOUS ONCE
Status: CANCELLED | OUTPATIENT
Start: 2023-05-24

## 2023-05-22 NOTE — PROGRESS NOTES
"SS received call from Patricia Vega requesting SS to contact patient regarding transportation.    SS contacted patient [phone].  Role of oncology social worker introduced to patient.    Patient is 64 Y/O white female diagnosed with Hepatocelluar carcinoma.    Patient states that she lives at home alone.    Patient doesn't utilize any home health.    Patient doesn't utilize any durable medical equipment.    Patient states that she has three children: Noemy Lewis, Fidelina, and Juvencio. Children live in [place].    Patient is transported by [facility] for doctor appointments. Patient's sister Mariajose Yancey assist with transportation monthly to take patient to grocery store and other needs.    Advance Care Planning:  The patient and I discussed care planning \"Conversations that Matter.\" This service was offered, free of charge, for development of advance directives with a certified ACP facilitator. The patient does not have an up-to-date advance directive. The patient is not interested in an appointment with one of our facilitators to create or update their advanced directives.    SS spoke with patient and she was not able to schedule appointment due to being outside the three day window.  SS called [facility] transportation [phone] [facility] to schedule patient for Wednesday, May 24 for 12:00 pm appointment at chemo clinic. Patient is scheduled for Wednesday, May 24 for 12:00 pm chemo appointment with  time by [facility] between 10:30 am -11:00 am. Confirmation # [id number]    SS scheduled patient for chemo appointment, June 12 at 8:00 am. SS made arrangements for June 12  time between 6:30 am and 7:00 am. Confirmation # [id number]    SS spoke with Patricia Vega RN ext [phone] who states that June 12 appointment will change.    SS will follow up with patient's schedule for next appointment.    SS sent in basket to staff making aware.    SS will follow.  "

## 2023-05-24 ENCOUNTER — OFFICE VISIT (OUTPATIENT)
Dept: ONCOLOGY | Facility: CLINIC | Age: 63
End: 2023-05-24
Payer: MEDICARE

## 2023-05-24 ENCOUNTER — INFUSION (OUTPATIENT)
Dept: ONCOLOGY | Facility: HOSPITAL | Age: 63
End: 2023-05-24
Payer: MEDICARE

## 2023-05-24 ENCOUNTER — LAB (OUTPATIENT)
Dept: ONCOLOGY | Facility: HOSPITAL | Age: 63
End: 2023-05-24
Payer: MEDICARE

## 2023-05-24 VITALS
OXYGEN SATURATION: 92 % | TEMPERATURE: 98 F | HEART RATE: 70 BPM | RESPIRATION RATE: 18 BRPM | WEIGHT: 130.8 LBS | DIASTOLIC BLOOD PRESSURE: 75 MMHG | BODY MASS INDEX: 20.49 KG/M2 | SYSTOLIC BLOOD PRESSURE: 147 MMHG

## 2023-05-24 VITALS
WEIGHT: 130.8 LBS | BODY MASS INDEX: 20.49 KG/M2 | OXYGEN SATURATION: 92 % | HEART RATE: 70 BPM | SYSTOLIC BLOOD PRESSURE: 147 MMHG | DIASTOLIC BLOOD PRESSURE: 75 MMHG | TEMPERATURE: 98 F | RESPIRATION RATE: 18 BRPM

## 2023-05-24 DIAGNOSIS — Z79.899 LONG-TERM USE OF HIGH-RISK MEDICATION: ICD-10-CM

## 2023-05-24 DIAGNOSIS — C22.0 HEPATOCELLULAR CARCINOMA: Primary | ICD-10-CM

## 2023-05-24 DIAGNOSIS — C22.0 HEPATOCELLULAR CARCINOMA: ICD-10-CM

## 2023-05-24 LAB
ALBUMIN SERPL-MCNC: 3.6 G/DL (ref 3.5–5.2)
ALBUMIN/GLOB SERPL: 1.1 G/DL
ALP SERPL-CCNC: 75 U/L (ref 39–117)
ALT SERPL W P-5'-P-CCNC: 37 U/L (ref 1–33)
ANION GAP SERPL CALCULATED.3IONS-SCNC: 9.3 MMOL/L (ref 5–15)
AST SERPL-CCNC: 44 U/L (ref 1–32)
BASOPHILS # BLD AUTO: 0.09 10*3/MM3 (ref 0–0.2)
BASOPHILS NFR BLD AUTO: 1.2 % (ref 0–1.5)
BILIRUB SERPL-MCNC: 0.4 MG/DL (ref 0–1.2)
BILIRUB UR QL STRIP: NEGATIVE
BUN SERPL-MCNC: 9 MG/DL (ref 8–23)
BUN/CREAT SERPL: 16.4 (ref 7–25)
CALCIUM SPEC-SCNC: 9.4 MG/DL (ref 8.6–10.5)
CHLORIDE SERPL-SCNC: 110 MMOL/L (ref 98–107)
CLARITY UR: CLEAR
CO2 SERPL-SCNC: 22.7 MMOL/L (ref 22–29)
COLOR UR: YELLOW
CREAT SERPL-MCNC: 0.55 MG/DL (ref 0.57–1)
DEPRECATED RDW RBC AUTO: 52.1 FL (ref 37–54)
EGFRCR SERPLBLD CKD-EPI 2021: 101.9 ML/MIN/1.73
EOSINOPHIL # BLD AUTO: 1.04 10*3/MM3 (ref 0–0.4)
EOSINOPHIL NFR BLD AUTO: 14 % (ref 0.3–6.2)
ERYTHROCYTE [DISTWIDTH] IN BLOOD BY AUTOMATED COUNT: 13.9 % (ref 12.3–15.4)
GLOBULIN UR ELPH-MCNC: 3.2 GM/DL
GLUCOSE SERPL-MCNC: 84 MG/DL (ref 65–99)
GLUCOSE UR STRIP-MCNC: NEGATIVE MG/DL
HCT VFR BLD AUTO: 47 % (ref 34–46.6)
HGB BLD-MCNC: 15.8 G/DL (ref 12–15.9)
HGB UR QL STRIP.AUTO: NEGATIVE
IMM GRANULOCYTES # BLD AUTO: 0.02 10*3/MM3 (ref 0–0.05)
IMM GRANULOCYTES NFR BLD AUTO: 0.3 % (ref 0–0.5)
KETONES UR QL STRIP: NEGATIVE
LEUKOCYTE ESTERASE UR QL STRIP.AUTO: ABNORMAL
LYMPHOCYTES # BLD AUTO: 1.78 10*3/MM3 (ref 0.7–3.1)
LYMPHOCYTES NFR BLD AUTO: 23.9 % (ref 19.6–45.3)
MCH RBC QN AUTO: 34.1 PG (ref 26.6–33)
MCHC RBC AUTO-ENTMCNC: 33.6 G/DL (ref 31.5–35.7)
MCV RBC AUTO: 101.3 FL (ref 79–97)
MONOCYTES # BLD AUTO: 0.88 10*3/MM3 (ref 0.1–0.9)
MONOCYTES NFR BLD AUTO: 11.8 % (ref 5–12)
NEUTROPHILS NFR BLD AUTO: 3.64 10*3/MM3 (ref 1.7–7)
NEUTROPHILS NFR BLD AUTO: 48.8 % (ref 42.7–76)
NITRITE UR QL STRIP: NEGATIVE
NRBC BLD AUTO-RTO: 0 /100 WBC (ref 0–0.2)
PH UR STRIP.AUTO: 6.5 [PH] (ref 5–8)
PLATELET # BLD AUTO: 125 10*3/MM3 (ref 140–450)
PMV BLD AUTO: 9.7 FL (ref 6–12)
POTASSIUM SERPL-SCNC: 3.8 MMOL/L (ref 3.5–5.2)
PROT SERPL-MCNC: 6.8 G/DL (ref 6–8.5)
PROT UR QL STRIP: NEGATIVE
RBC # BLD AUTO: 4.64 10*6/MM3 (ref 3.77–5.28)
SODIUM SERPL-SCNC: 142 MMOL/L (ref 136–145)
SP GR UR STRIP: 1.01 (ref 1–1.03)
UROBILINOGEN UR QL STRIP: ABNORMAL
WBC NRBC COR # BLD: 7.45 10*3/MM3 (ref 3.4–10.8)

## 2023-05-24 PROCEDURE — 25010000002 ATEZOLIZUMAB 1200 MG/20ML SOLUTION 20 ML VIAL: Performed by: INTERNAL MEDICINE

## 2023-05-24 PROCEDURE — 1126F AMNT PAIN NOTED NONE PRSNT: CPT | Performed by: INTERNAL MEDICINE

## 2023-05-24 PROCEDURE — 80053 COMPREHEN METABOLIC PANEL: CPT

## 2023-05-24 PROCEDURE — 99214 OFFICE O/P EST MOD 30 MIN: CPT | Performed by: INTERNAL MEDICINE

## 2023-05-24 PROCEDURE — 82105 ALPHA-FETOPROTEIN SERUM: CPT | Performed by: INTERNAL MEDICINE

## 2023-05-24 PROCEDURE — 81003 URINALYSIS AUTO W/O SCOPE: CPT

## 2023-05-24 PROCEDURE — 96413 CHEMO IV INFUSION 1 HR: CPT

## 2023-05-24 PROCEDURE — 85025 COMPLETE CBC W/AUTO DIFF WBC: CPT

## 2023-05-24 RX ORDER — SODIUM CHLORIDE 9 MG/ML
250 INJECTION, SOLUTION INTRAVENOUS ONCE
Status: COMPLETED | OUTPATIENT
Start: 2023-05-24 | End: 2023-05-24

## 2023-05-24 RX ORDER — DRONABINOL 2.5 MG/1
2.5 CAPSULE ORAL
Qty: 60 CAPSULE | Refills: 2 | Status: SHIPPED | OUTPATIENT
Start: 2023-05-24

## 2023-05-24 RX ADMIN — ATEZOLIZUMAB 1200 MG: 1200 INJECTION, SOLUTION INTRAVENOUS at 14:37

## 2023-05-24 RX ADMIN — SODIUM CHLORIDE 250 ML: 9 INJECTION, SOLUTION INTRAVENOUS at 14:37

## 2023-05-24 NOTE — PROGRESS NOTES
Name:  Noemy Brooks  :  1960  Date:  2023     REFERRING PHYSICIAN  Henri Melvin MD    PRIMARY CARE PHYSICIAN  Lynn, Haley Roca DO    REASON FOR FOLLOWUP  1. Hepatocellular carcinoma      CHIEF COMPLAINT  None.    Dear Dr. Bailey,    HISTORY OF PRESENT ILLNESS:   I saw Ms. Brooks in followup today in our medical oncology clinic. As you are aware, she is a pleasant, 63 y.o., white female with a history of hypertension, hepatitis C (treated with antivirals and reportedly cured in ~), cirrhosis and hepatocellular carcinoma who was initially diagnosed with the latter in ~2022. [place], [place]. Her [place] gastroenterologist referred her to  at that time once she was found to have a couple of liver masses and a serum AFP > 1,000 ng/mL. She was evaluated by the liver transplant service; however, due to the HCC's involvement of the hepatic vessels, she was felt to not be a candidate (for transplant). She was subsequently evaluated by  medical oncology, who recommended starting first-line, palliative treatment with a combination of c1gyqlxi bevacizumab (Mvasi) and atezolizumab (Tecentriq), per the current standard of care. She received a total of eight (8) cycles between 2022 and early 2023 through the [facility]. Due to transportation issues (her sister has to be the one to drive her, and the trips to [place] have been getting increasingly difficult for her), she presented to our clinic in 2023 in order to transfer her ongoing, oncologic care to us, closer to her home in [place], [place].    INTERIM HISTORY:  Ms. Brooks returns to clinic today for follow up by herself. She continues to tolerate [id number] atezolizumab very well, without any noticeable side effects; and she has now received a total of nine (9) cycles to date. She previously stated that the Lord cured her once before (of Hepatitis C), and she has recently become convinced that he has/will do it again  (this time for her HCC). Despite this statement, she remains agreeable to continuing the immunotherapy (she has discontinued a few of other medications, including her appetite stimulant (Megace), due to concerns over potential side effects. She would be willing to try a different appetite stimulant. She again has no specific complaints, including pain.    Past Medical History:   Diagnosis Date   • Acid reflux    • Cancer    • Cirrhosis    • COPD (chronic obstructive pulmonary disease)    • Depression    • High cholesterol    • Hypertension    • Infectious viral hepatitis    • Ovarian cancer        Past Surgical History:   Procedure Laterality Date   • APPENDECTOMY N/A    • COLONOSCOPY     • HYSTERECTOMY     • UPPER GASTROINTESTINAL ENDOSCOPY         Social History     Socioeconomic History   • Marital status:    • Number of children: 3   Tobacco Use   • Smoking status: Former     Types: Cigarettes     Quit date: 2015     Years since quittin.0   • Smokeless tobacco: Never   • Tobacco comments:     smoked for approx 20 years, 1.5 to 2 ppd   Vaping Use   • Vaping Use: Never used   Substance and Sexual Activity   • Alcohol use: No     Comment: she previously drank daily for 16 years, beer   • Drug use: No   • Sexual activity: Defer       Family History   Problem Relation Age of Onset   • Stroke Mother    • Hypertension Mother    • COPD Mother    • Throat cancer Mother    • Arthritis Mother    • Asthma Mother    • Heart attack Mother    • Stroke Father    • Cancer Father    • Liver cancer Father        Allergies   Allergen Reactions   • Codeine GI Intolerance       Current Outpatient Medications   Medication Sig Dispense Refill   • budesonide-formoterol (SYMBICORT) 160-4.5 MCG/ACT inhaler Inhale 2 puffs 2 (Two) Times a Day.     • cholecalciferol (VITAMIN D3) 25 MCG (1000 UT) tablet Take 1 tablet by mouth Daily.     • cyclobenzaprine (FLEXERIL) 5 MG tablet Take 1 tablet by mouth 3 (Three) Times a Day As  Needed for Muscle Spasms.     • diphenhydrAMINE 12.5 MG/5ML elixir 20 mL, aluminum-magnesium hydroxide-simethicone 400-400-40 MG/5ML suspension 20 mL, Lidocaine Viscous HCl 2 % solution 20 mL Swish and spit 15 mL Every 4 (Four) Hours As Needed.     • dronabinol (Marinol) 2.5 MG capsule Take 1 capsule by mouth 2 (Two) Times a Day Before Meals. 60 capsule 2   • famotidine (PEPCID) 40 MG tablet Take 1 tablet by mouth Daily.     • levothyroxine (SYNTHROID, LEVOTHROID) 25 MCG tablet Take 1 tablet by mouth Every Morning.     • lisinopril (PRINIVIL,ZESTRIL) 30 MG tablet Take 1 tablet by mouth Daily.     • lisinopril-hydrochlorothiazide (PRINZIDE,ZESTORETIC) 20-12.5 MG per tablet Take 1 tablet by mouth Daily.     • pantoprazole (PROTONIX) 40 MG EC tablet Take 1 tablet by mouth Daily.     • traZODone (DESYREL) 100 MG tablet Take 1 tablet by mouth Every Night.     • vitamin D (ERGOCALCIFEROL) 1.25 MG (38081 UT) capsule capsule Take 1 capsule by mouth 1 (One) Time Per Week.       No current facility-administered medications for this visit.     REVIEW OF SYSTEMS  CONSTITUTIONAL:  No fever, chills, night sweats or fatigue.  EYES:  No blurry vision, diplopia or other vision changes.  ENT:  No hearing loss, nosebleeds or sore throat.  CARDIOVASCULAR:  No palpitations, arrhythmia, syncopal episodes or edema.  PULMONARY:  No hemoptysis, wheezing, chronic cough or shortness of breath.  GASTROINTESTINAL:  No nausea or vomiting.  No constipation or diarrhea.  No abdominal pain.  GENITOURINARY:  No hematuria, kidney stones or frequent urination.  MUSCULOSKELETAL:  No joint or back pains.  INTEGUMENTARY: No rashes or pruritus.  ENDOCRINE:  No excessive thirst or hot flashes.  HEMATOLOGIC:  No history of free bleeding, spontaneous bleeding or clotting.  IMMUNOLOGIC:  No allergies or frequent infections.  NEUROLOGIC: No numbness, tingling, seizures or weakness.  PSYCHIATRIC:  No anxiety or depression.    PHYSICAL EXAMINATION  /75    Pulse 70   Temp 98 °F (36.7 °C) (Temporal)   Resp 18   Wt 59.3 kg (130 lb 12.8 oz)   SpO2 92%   BMI 20.49 kg/m²     Pain Score:  Pain Score    23 1250   PainSc: 0-No pain       PHQ-Score Total:  PHQ-9 Total Score:      ECO  GENERAL:  A well-developed, well-nourished, thin, white female in no acute distress.  HEENT:  Pupils equally round and reactive to light. Extraocular muscles intact.  CARDIOVASCULAR:  Regular rate and rhythm. No murmurs, gallops or rubs.  LUNGS:  Clear to auscultation bilaterally.  ABDOMEN:  Soft, nontender, nondistended with positive bowel sounds.  EXTREMITIES:  No clubbing, cyanosis or edema bilaterally.  SKIN:  No rashes or petechiae.  NEURO:  Cranial nerves grossly intact. No focal deficits.  PSYCH:  Alert and oriented x3.    The physical exam is once again unchanged from recent priors.    LABORATORY  Lab Results   Component Value Date    WBC 7.45 2023    HGB 15.8 2023    HCT 47.0 (H) 2023    .3 (H) 2023     (L) 2023    NEUTROABS 3.64 2023       Lab Results   Component Value Date     2023    K 3.5 2023     (H) 2023    CO2 21.9 (L) 2023    BUN 9 2023    CREATININE 0.55 (L) 2023    GLUCOSE 83 2023    CALCIUM 9.9 2023    AST 43 (H) 2023    ALT 36 (H) 2023    ALKPHOS 76 2023    BILITOT 0.6 2023    PROTEINTOT 7.2 2023    ALBUMIN 3.6 2023     CBC (2023): WBCs: 7.45; HgB: 15.8; Hct: 47.0; platelets: 125  CBC (2023): WBCs: 6.91; HgB: 15.9; Hct: 47.7; platelets: 118  CBC (2023): WBCs: 6.68; HgB: 15.9; Hct: 45.7; platelets: 116; MCV: 94    AFP (2023): pending  AFP (2023): < 2 ng/mL  AFP (2023): 2.6 ng/mL  AFP (10/31/2022): 1031.0 ng/mL    IMAGING  CT liver (10/21/2022, at ):  Impression: LR 5 segment 7 lesion measuring up to 2.8 x 2.5 cm with associated TIV (tumor in vein). LR 3 legion measuring 8 mm in  segment 4A.    CT chest, abdomen and pelvis with contrast (01/27/2023, at ):  Impression:  Chest: No convincing metastatic disease in the thorax.  Abdomen/Pelvis: Within the limits of the study, the area of washout representing the known HCC is stable to smaller in size. The component representing the tumor in vein is smaller in size. No new liver lesions. No distant metastatic disease.    MRI abdomen with and without contrast (04/13/2023):  Impression:  1) No solid arterial phase enhancing liver lesions identified. No delayed phase enhancing liver lesions are noted.  2) Liver cirrhosis and portal hypertension.  3) 0.6 cm simple appearing benign hepatic cyst right lobe.  4) Simple appearing cyst right kidney. No hydronephrosis.  5) Other nonacute findings.    PATHOLOGY    IMPRESSION AND PLAN  Ms. Brooks is a 63 y.o., white female with:  1. Hepatocellular carcinoma: Initially diagnosed in Fall 2022 after a CT of the liver (performed on 10/21/2022 at , summarized above) confirmed the presence of two lesions (one measuring 2.8 x 2.5 cm in segment 7 and a probable satellite measuring ~8 mm in segment 4A) in the setting of known, baseline cirrhosis and a serum AFP level of > 1000 ng/mL. Due to venous involvement by the tumor, she was/is not a candidate for a liver transplant.  medical oncology therefore recommended initiating first-line, palliative, systemic treatment with a combination of [id number] bevacizumab (the Avastin biosimilar Mvasi) and c3gcujvt atezolizumab (Tecentriq). She received a total of eight (8), i2kbqcbu cycles through the [facility] between Fall 2022 and early April 2023 (the eighth and final cycle of both were given on 04/03/2023). With this therapy, the most recent imaging, an MRI of the abdomen (liver protocol) performed on 04/13/2023 (and summarized above) showed no visible signs of a liver mass at all; and, meanwhile, with the therapy she has received to date, her serum AFP level  "has declined from > 1000 ng/mL in late 2023 to solidly WNL (2.6 ng/mL) as of late 2023, consistent with a complete response in her disease (her serum AFP level was actually undetectable on the most recent recheck, on 2023). I therefore had a long discussion with the patient and her sister in Spring 2023 regarding our treatment recommendations from that point. In short, the potential risks of any additional cycles of Avastin (particularly bleeding; she has an aortic aneurysm in addition to her baseline cirrhosis) likely outweighed, and still outweighs, its marginal (and, at this time, probably nonexistent), potential benefit (at least at this time); however, continuing indefinite, j3ngkxdy atezolizumab continues to be recommended (as the current standard of care; it appears to have worked extremely well). She remains agreeable to proceeding (with the atezolizumab) alone; and she has now completed a total of nine (9) cycles. We will proceed with this current treatment plan (tenth cycle of atezolizumab today). We will see her back in our clinic in three months, on the day of the fourteenth cycle of atezolizumab, with a CBC, CMP, TSH and AFP for another symptom/toxicity check. As long as she continues to do clinically well and her serum AFP levels are not rising again, we will plan to repeat an MRI of the liver in ~six months (~2023).  2. Cirrhosis: Likely secondary to a longstanding history of both issue #3 (which was diagnosed and reportedly cured in ~2018, probably decades after she initially contracted it through her ) and alcohol abuse (she drank \"a lot\" of beer every day for ~twenty years but quit in the ). Currently still compensated. Ongoing management per gastroenterology/hepatology in [place].  3. Hepatitis C: Reportedly contracted through her  (who ultimately  from it) without her knowledge, but also reportedly diagnosed and cured with a months-long course " of antivirals in ~2018. Ongoing management per gastroenterology/hepatology in [place].  4. Protein calorie malnutrition: She discontinued Megace due to concerns over potential side effects. While she has recently apparently been eating a little better regardless, she feels as though she still needs an appetite stimulant; and she remains willing to give something else a try. A Rx for Marinol 2.5 mg PO BID was provided today. Continue to monitor.  The patient was in agreement with these plans.    It is a pleasure to participate in Ms. Brooks's care. Please do not hesitate to call with any questions or concerns that you may have.    A total of 30 minutes were spent coordinating this patient’s care in clinic today; more than 50% of this time was face-to-face with the patient, reviewing her interim medical history, discussing the results of the most recent repeat serum AFP levels and counseling on the current treatment and followup plan. All questions were answered to her satisfaction.    FOLLOW UP  Rx for Marinol 2.5 mg PO BID provided today. Proceed with u7lopiwk atezolizumab, as planned (10th, [id number] cycle today). Repeat an AFP in 6 weeks. Return to our clinic in 3 months (~late August), on the day of the 14th overall cycle of atezolizumab, with a CBC, CMP, TSH and AFP.            This document was electronically signed by NADEGE Boss MD May 24, 2023 13:25 EDT      CC: DO Henri Davis MD Hao Zhonglin, MD

## 2023-05-25 LAB — ALPHA-FETOPROTEIN: 2.66 NG/ML (ref 0–8.3)

## 2023-06-09 ENCOUNTER — DOCUMENTATION (OUTPATIENT)
Dept: ONCOLOGY | Facility: HOSPITAL | Age: 63
End: 2023-06-09
Payer: MEDICARE

## 2023-06-09 NOTE — PROGRESS NOTES
SS contacted patient's home phone [phone] to follow up with patient regarding transportation arrangements.    Patient states that she has spoken with [facility] and appointments are set up for June.    SS provided patient with contact information. Patient agreeable to calling SS with any questions or concerns.    Patient states being very appreciative for  contacting her and following up.    SS will follow.

## 2023-06-14 ENCOUNTER — INFUSION (OUTPATIENT)
Dept: ONCOLOGY | Facility: HOSPITAL | Age: 63
End: 2023-06-14
Payer: MEDICARE

## 2023-06-14 ENCOUNTER — APPOINTMENT (OUTPATIENT)
Dept: ONCOLOGY | Facility: HOSPITAL | Age: 63
End: 2023-06-14
Payer: MEDICARE

## 2023-06-14 VITALS
BODY MASS INDEX: 20.6 KG/M2 | HEART RATE: 64 BPM | WEIGHT: 131.5 LBS | DIASTOLIC BLOOD PRESSURE: 65 MMHG | RESPIRATION RATE: 18 BRPM | OXYGEN SATURATION: 93 % | TEMPERATURE: 97.3 F | SYSTOLIC BLOOD PRESSURE: 121 MMHG

## 2023-06-14 DIAGNOSIS — C22.0 HEPATOCELLULAR CARCINOMA: Primary | ICD-10-CM

## 2023-06-14 LAB
ALBUMIN SERPL-MCNC: 3.6 G/DL (ref 3.5–5.2)
ALBUMIN/GLOB SERPL: 1.2 G/DL
ALP SERPL-CCNC: 64 U/L (ref 39–117)
ALT SERPL W P-5'-P-CCNC: 32 U/L (ref 1–33)
ANION GAP SERPL CALCULATED.3IONS-SCNC: 9.9 MMOL/L (ref 5–15)
AST SERPL-CCNC: 37 U/L (ref 1–32)
BASOPHILS # BLD AUTO: 0.08 10*3/MM3 (ref 0–0.2)
BASOPHILS NFR BLD AUTO: 1.2 % (ref 0–1.5)
BILIRUB SERPL-MCNC: 0.7 MG/DL (ref 0–1.2)
BILIRUB UR QL STRIP: NEGATIVE
BUN SERPL-MCNC: 9 MG/DL (ref 8–23)
BUN/CREAT SERPL: 16.4 (ref 7–25)
CALCIUM SPEC-SCNC: 9.3 MG/DL (ref 8.6–10.5)
CHLORIDE SERPL-SCNC: 109 MMOL/L (ref 98–107)
CLARITY UR: CLEAR
CO2 SERPL-SCNC: 25.1 MMOL/L (ref 22–29)
COLOR UR: YELLOW
CREAT SERPL-MCNC: 0.55 MG/DL (ref 0.57–1)
DEPRECATED RDW RBC AUTO: 52 FL (ref 37–54)
EGFRCR SERPLBLD CKD-EPI 2021: 101.9 ML/MIN/1.73
EOSINOPHIL # BLD AUTO: 0.65 10*3/MM3 (ref 0–0.4)
EOSINOPHIL NFR BLD AUTO: 10.1 % (ref 0.3–6.2)
ERYTHROCYTE [DISTWIDTH] IN BLOOD BY AUTOMATED COUNT: 13.8 % (ref 12.3–15.4)
GLOBULIN UR ELPH-MCNC: 3 GM/DL
GLUCOSE SERPL-MCNC: 78 MG/DL (ref 65–99)
GLUCOSE UR STRIP-MCNC: NEGATIVE MG/DL
HCT VFR BLD AUTO: 44.6 % (ref 34–46.6)
HGB BLD-MCNC: 14.8 G/DL (ref 12–15.9)
HGB UR QL STRIP.AUTO: NEGATIVE
IMM GRANULOCYTES # BLD AUTO: 0.01 10*3/MM3 (ref 0–0.05)
IMM GRANULOCYTES NFR BLD AUTO: 0.2 % (ref 0–0.5)
KETONES UR QL STRIP: NEGATIVE
LEUKOCYTE ESTERASE UR QL STRIP.AUTO: ABNORMAL
LYMPHOCYTES # BLD AUTO: 1.25 10*3/MM3 (ref 0.7–3.1)
LYMPHOCYTES NFR BLD AUTO: 19.4 % (ref 19.6–45.3)
MCH RBC QN AUTO: 33.5 PG (ref 26.6–33)
MCHC RBC AUTO-ENTMCNC: 33.2 G/DL (ref 31.5–35.7)
MCV RBC AUTO: 100.9 FL (ref 79–97)
MONOCYTES # BLD AUTO: 0.77 10*3/MM3 (ref 0.1–0.9)
MONOCYTES NFR BLD AUTO: 12 % (ref 5–12)
NEUTROPHILS NFR BLD AUTO: 3.67 10*3/MM3 (ref 1.7–7)
NEUTROPHILS NFR BLD AUTO: 57.1 % (ref 42.7–76)
NITRITE UR QL STRIP: NEGATIVE
NRBC BLD AUTO-RTO: 0 /100 WBC (ref 0–0.2)
PH UR STRIP.AUTO: 6.5 [PH] (ref 5–8)
PLATELET # BLD AUTO: 122 10*3/MM3 (ref 140–450)
PMV BLD AUTO: 9.5 FL (ref 6–12)
POTASSIUM SERPL-SCNC: 3.6 MMOL/L (ref 3.5–5.2)
PROT SERPL-MCNC: 6.6 G/DL (ref 6–8.5)
PROT UR QL STRIP: NEGATIVE
RBC # BLD AUTO: 4.42 10*6/MM3 (ref 3.77–5.28)
SODIUM SERPL-SCNC: 144 MMOL/L (ref 136–145)
SP GR UR STRIP: 1.01 (ref 1–1.03)
T4 FREE SERPL-MCNC: 0.86 NG/DL (ref 0.93–1.7)
TSH SERPL DL<=0.05 MIU/L-ACNC: 6.07 UIU/ML (ref 0.27–4.2)
UROBILINOGEN UR QL STRIP: ABNORMAL
WBC NRBC COR # BLD: 6.43 10*3/MM3 (ref 3.4–10.8)

## 2023-06-14 PROCEDURE — 81003 URINALYSIS AUTO W/O SCOPE: CPT

## 2023-06-14 PROCEDURE — 80053 COMPREHEN METABOLIC PANEL: CPT

## 2023-06-14 PROCEDURE — 84443 ASSAY THYROID STIM HORMONE: CPT

## 2023-06-14 PROCEDURE — 96413 CHEMO IV INFUSION 1 HR: CPT

## 2023-06-14 PROCEDURE — 84439 ASSAY OF FREE THYROXINE: CPT

## 2023-06-14 PROCEDURE — 85025 COMPLETE CBC W/AUTO DIFF WBC: CPT

## 2023-06-14 PROCEDURE — 25010000002 ATEZOLIZUMAB 1200 MG/20ML SOLUTION 20 ML VIAL: Performed by: INTERNAL MEDICINE

## 2023-06-14 RX ORDER — LEVOTHYROXINE SODIUM 0.03 MG/1
25 TABLET ORAL
Qty: 30 TABLET | Refills: 1 | Status: SHIPPED | OUTPATIENT
Start: 2023-06-14

## 2023-06-14 RX ORDER — SODIUM CHLORIDE 9 MG/ML
250 INJECTION, SOLUTION INTRAVENOUS ONCE
Status: COMPLETED | OUTPATIENT
Start: 2023-06-14 | End: 2023-06-14

## 2023-06-14 RX ADMIN — SODIUM CHLORIDE 250 ML: 9 INJECTION, SOLUTION INTRAVENOUS at 09:48

## 2023-06-14 RX ADMIN — ATEZOLIZUMAB 1200 MG: 1200 INJECTION, SOLUTION INTRAVENOUS at 09:48

## 2023-07-21 PROBLEM — R13.19 ESOPHAGEAL DYSPHAGIA: Status: ACTIVE | Noted: 2023-07-21

## 2023-07-26 ENCOUNTER — LAB (OUTPATIENT)
Dept: ONCOLOGY | Facility: HOSPITAL | Age: 63
End: 2023-07-26
Payer: MEDICARE

## 2023-07-26 ENCOUNTER — INFUSION (OUTPATIENT)
Dept: ONCOLOGY | Facility: HOSPITAL | Age: 63
End: 2023-07-26
Payer: MEDICARE

## 2023-07-26 VITALS
RESPIRATION RATE: 18 BRPM | WEIGHT: 134.9 LBS | SYSTOLIC BLOOD PRESSURE: 112 MMHG | DIASTOLIC BLOOD PRESSURE: 62 MMHG | TEMPERATURE: 97.9 F | OXYGEN SATURATION: 96 % | HEART RATE: 65 BPM | BODY MASS INDEX: 21.77 KG/M2

## 2023-07-26 DIAGNOSIS — C22.0 HEPATOCELLULAR CARCINOMA: Primary | ICD-10-CM

## 2023-07-26 DIAGNOSIS — C22.0 HEPATOCELLULAR CARCINOMA: ICD-10-CM

## 2023-07-26 LAB
ALBUMIN SERPL-MCNC: 3.9 G/DL (ref 3.5–5.2)
ALBUMIN/GLOB SERPL: 1.2 G/DL
ALP SERPL-CCNC: 74 U/L (ref 39–117)
ALT SERPL W P-5'-P-CCNC: 36 U/L (ref 1–33)
ANION GAP SERPL CALCULATED.3IONS-SCNC: 9.6 MMOL/L (ref 5–15)
AST SERPL-CCNC: 40 U/L (ref 1–32)
BASOPHILS # BLD AUTO: 0.08 10*3/MM3 (ref 0–0.2)
BASOPHILS NFR BLD AUTO: 1.2 % (ref 0–1.5)
BILIRUB SERPL-MCNC: 0.8 MG/DL (ref 0–1.2)
BILIRUB UR QL STRIP: NEGATIVE
BUN SERPL-MCNC: 9 MG/DL (ref 8–23)
BUN/CREAT SERPL: 15 (ref 7–25)
CALCIUM SPEC-SCNC: 9.6 MG/DL (ref 8.6–10.5)
CHLORIDE SERPL-SCNC: 109 MMOL/L (ref 98–107)
CLARITY UR: CLEAR
CO2 SERPL-SCNC: 24.4 MMOL/L (ref 22–29)
COLOR UR: ABNORMAL
CREAT SERPL-MCNC: 0.6 MG/DL (ref 0.57–1)
DEPRECATED RDW RBC AUTO: 52.1 FL (ref 37–54)
EGFRCR SERPLBLD CKD-EPI 2021: 99.8 ML/MIN/1.73
EOSINOPHIL # BLD AUTO: 0.53 10*3/MM3 (ref 0–0.4)
EOSINOPHIL NFR BLD AUTO: 7.9 % (ref 0.3–6.2)
ERYTHROCYTE [DISTWIDTH] IN BLOOD BY AUTOMATED COUNT: 14 % (ref 12.3–15.4)
GLOBULIN UR ELPH-MCNC: 3.3 GM/DL
GLUCOSE SERPL-MCNC: 66 MG/DL (ref 65–99)
GLUCOSE UR STRIP-MCNC: NEGATIVE MG/DL
HCT VFR BLD AUTO: 44.2 % (ref 34–46.6)
HGB BLD-MCNC: 14.6 G/DL (ref 12–15.9)
HGB UR QL STRIP.AUTO: NEGATIVE
IMM GRANULOCYTES # BLD AUTO: 0.01 10*3/MM3 (ref 0–0.05)
IMM GRANULOCYTES NFR BLD AUTO: 0.1 % (ref 0–0.5)
KETONES UR QL STRIP: NEGATIVE
LEUKOCYTE ESTERASE UR QL STRIP.AUTO: NEGATIVE
LYMPHOCYTES # BLD AUTO: 1.4 10*3/MM3 (ref 0.7–3.1)
LYMPHOCYTES NFR BLD AUTO: 20.9 % (ref 19.6–45.3)
MCH RBC QN AUTO: 33.2 PG (ref 26.6–33)
MCHC RBC AUTO-ENTMCNC: 33 G/DL (ref 31.5–35.7)
MCV RBC AUTO: 100.5 FL (ref 79–97)
MONOCYTES # BLD AUTO: 0.8 10*3/MM3 (ref 0.1–0.9)
MONOCYTES NFR BLD AUTO: 11.9 % (ref 5–12)
NEUTROPHILS NFR BLD AUTO: 3.88 10*3/MM3 (ref 1.7–7)
NEUTROPHILS NFR BLD AUTO: 58 % (ref 42.7–76)
NITRITE UR QL STRIP: NEGATIVE
NRBC BLD AUTO-RTO: 0 /100 WBC (ref 0–0.2)
PH UR STRIP.AUTO: 7 [PH] (ref 5–8)
PLATELET # BLD AUTO: 119 10*3/MM3 (ref 140–450)
PMV BLD AUTO: 9.9 FL (ref 6–12)
POTASSIUM SERPL-SCNC: 3.6 MMOL/L (ref 3.5–5.2)
PROT SERPL-MCNC: 7.2 G/DL (ref 6–8.5)
PROT UR QL STRIP: NEGATIVE
RBC # BLD AUTO: 4.4 10*6/MM3 (ref 3.77–5.28)
SODIUM SERPL-SCNC: 143 MMOL/L (ref 136–145)
SP GR UR STRIP: <=1.005 (ref 1–1.03)
T4 FREE SERPL-MCNC: 0.94 NG/DL (ref 0.93–1.7)
TSH SERPL DL<=0.05 MIU/L-ACNC: 3.57 UIU/ML (ref 0.27–4.2)
UROBILINOGEN UR QL STRIP: ABNORMAL
WBC NRBC COR # BLD: 6.7 10*3/MM3 (ref 3.4–10.8)

## 2023-07-26 PROCEDURE — 96413 CHEMO IV INFUSION 1 HR: CPT

## 2023-07-26 PROCEDURE — 81003 URINALYSIS AUTO W/O SCOPE: CPT

## 2023-07-26 PROCEDURE — 84443 ASSAY THYROID STIM HORMONE: CPT

## 2023-07-26 PROCEDURE — 80053 COMPREHEN METABOLIC PANEL: CPT

## 2023-07-26 PROCEDURE — 84439 ASSAY OF FREE THYROXINE: CPT

## 2023-07-26 PROCEDURE — 25010000002 ATEZOLIZUMAB 1200 MG/20ML SOLUTION 20 ML VIAL: Performed by: INTERNAL MEDICINE

## 2023-07-26 PROCEDURE — 85025 COMPLETE CBC W/AUTO DIFF WBC: CPT

## 2023-07-26 RX ORDER — SODIUM CHLORIDE 9 MG/ML
250 INJECTION, SOLUTION INTRAVENOUS ONCE
Status: COMPLETED | OUTPATIENT
Start: 2023-07-26 | End: 2023-07-26

## 2023-07-26 RX ADMIN — ATEZOLIZUMAB 1200 MG: 1200 INJECTION, SOLUTION INTRAVENOUS at 09:57

## 2023-07-26 RX ADMIN — SODIUM CHLORIDE 250 ML: 9 INJECTION, SOLUTION INTRAVENOUS at 09:57

## 2023-08-03 ENCOUNTER — TELEPHONE (OUTPATIENT)
Dept: ONCOLOGY | Facility: CLINIC | Age: 63
End: 2023-08-03
Payer: MEDICARE

## 2023-08-08 ENCOUNTER — ANESTHESIA EVENT (OUTPATIENT)
Dept: PERIOP | Facility: HOSPITAL | Age: 63
End: 2023-08-08
Payer: MEDICARE

## 2023-08-08 ENCOUNTER — APPOINTMENT (OUTPATIENT)
Dept: GENERAL RADIOLOGY | Facility: HOSPITAL | Age: 63
End: 2023-08-08
Payer: MEDICARE

## 2023-08-08 ENCOUNTER — ANESTHESIA (OUTPATIENT)
Dept: PERIOP | Facility: HOSPITAL | Age: 63
End: 2023-08-08
Payer: MEDICARE

## 2023-08-08 ENCOUNTER — HOSPITAL ENCOUNTER (OUTPATIENT)
Facility: HOSPITAL | Age: 63
Setting detail: HOSPITAL OUTPATIENT SURGERY
Discharge: HOME | End: 2023-08-08
Attending: INTERNAL MEDICINE | Admitting: INTERNAL MEDICINE
Payer: MEDICARE

## 2023-08-08 VITALS
HEIGHT: 64 IN | DIASTOLIC BLOOD PRESSURE: 74 MMHG | BODY MASS INDEX: 22.71 KG/M2 | RESPIRATION RATE: 20 BRPM | OXYGEN SATURATION: 93 % | WEIGHT: 133 LBS | HEART RATE: 63 BPM | TEMPERATURE: 98 F | SYSTOLIC BLOOD PRESSURE: 118 MMHG

## 2023-08-08 DIAGNOSIS — R13.19 ESOPHAGEAL DYSPHAGIA: ICD-10-CM

## 2023-08-08 DIAGNOSIS — C22.0 HEPATOCELLULAR CARCINOMA: Primary | ICD-10-CM

## 2023-08-08 PROCEDURE — 43235 EGD DIAGNOSTIC BRUSH WASH: CPT | Performed by: INTERNAL MEDICINE

## 2023-08-08 PROCEDURE — 25010000002 PROPOFOL 200 MG/20ML EMULSION: Performed by: NURSE ANESTHETIST, CERTIFIED REGISTERED

## 2023-08-08 RX ORDER — MIDAZOLAM HYDROCHLORIDE 1 MG/ML
0.5 INJECTION INTRAMUSCULAR; INTRAVENOUS
Status: DISCONTINUED | OUTPATIENT
Start: 2023-08-08 | End: 2023-08-08 | Stop reason: HOSPADM

## 2023-08-08 RX ORDER — SODIUM CHLORIDE 0.9 % (FLUSH) 0.9 %
10 SYRINGE (ML) INJECTION AS NEEDED
Status: DISCONTINUED | OUTPATIENT
Start: 2023-08-08 | End: 2023-08-08 | Stop reason: HOSPADM

## 2023-08-08 RX ORDER — SODIUM CHLORIDE 9 MG/ML
250 INJECTION, SOLUTION INTRAVENOUS ONCE
OUTPATIENT
Start: 2023-09-06

## 2023-08-08 RX ORDER — PROPOFOL 10 MG/ML
INJECTION, EMULSION INTRAVENOUS AS NEEDED
Status: DISCONTINUED | OUTPATIENT
Start: 2023-08-08 | End: 2023-08-08 | Stop reason: SURG

## 2023-08-08 RX ORDER — OMEPRAZOLE 20 MG/1
20 CAPSULE, DELAYED RELEASE ORAL DAILY
COMMUNITY

## 2023-08-08 RX ORDER — OXYCODONE HYDROCHLORIDE AND ACETAMINOPHEN 5; 325 MG/1; MG/1
1 TABLET ORAL ONCE AS NEEDED
Status: DISCONTINUED | OUTPATIENT
Start: 2023-08-08 | End: 2023-08-08 | Stop reason: HOSPADM

## 2023-08-08 RX ORDER — FENTANYL CITRATE 50 UG/ML
50 INJECTION, SOLUTION INTRAMUSCULAR; INTRAVENOUS
Status: DISCONTINUED | OUTPATIENT
Start: 2023-08-08 | End: 2023-08-08 | Stop reason: HOSPADM

## 2023-08-08 RX ORDER — KETOROLAC TROMETHAMINE 30 MG/ML
30 INJECTION, SOLUTION INTRAMUSCULAR; INTRAVENOUS EVERY 6 HOURS PRN
Status: DISCONTINUED | OUTPATIENT
Start: 2023-08-08 | End: 2023-08-08 | Stop reason: HOSPADM

## 2023-08-08 RX ORDER — SODIUM CHLORIDE 9 MG/ML
40 INJECTION, SOLUTION INTRAVENOUS AS NEEDED
Status: DISCONTINUED | OUTPATIENT
Start: 2023-08-08 | End: 2023-08-08 | Stop reason: HOSPADM

## 2023-08-08 RX ORDER — SODIUM CHLORIDE, SODIUM LACTATE, POTASSIUM CHLORIDE, CALCIUM CHLORIDE 600; 310; 30; 20 MG/100ML; MG/100ML; MG/100ML; MG/100ML
100 INJECTION, SOLUTION INTRAVENOUS ONCE AS NEEDED
Status: DISCONTINUED | OUTPATIENT
Start: 2023-08-08 | End: 2023-08-08 | Stop reason: HOSPADM

## 2023-08-08 RX ORDER — ONDANSETRON 2 MG/ML
4 INJECTION INTRAMUSCULAR; INTRAVENOUS AS NEEDED
Status: DISCONTINUED | OUTPATIENT
Start: 2023-08-08 | End: 2023-08-08 | Stop reason: HOSPADM

## 2023-08-08 RX ORDER — ALBUTEROL SULFATE 90 UG/1
2 AEROSOL, METERED RESPIRATORY (INHALATION) EVERY 4 HOURS PRN
COMMUNITY

## 2023-08-08 RX ORDER — SODIUM CHLORIDE, SODIUM LACTATE, POTASSIUM CHLORIDE, CALCIUM CHLORIDE 600; 310; 30; 20 MG/100ML; MG/100ML; MG/100ML; MG/100ML
125 INJECTION, SOLUTION INTRAVENOUS ONCE
Status: COMPLETED | OUTPATIENT
Start: 2023-08-08 | End: 2023-08-08

## 2023-08-08 RX ORDER — MIDAZOLAM HYDROCHLORIDE 1 MG/ML
1 INJECTION INTRAMUSCULAR; INTRAVENOUS
Status: DISCONTINUED | OUTPATIENT
Start: 2023-08-08 | End: 2023-08-08 | Stop reason: HOSPADM

## 2023-08-08 RX ORDER — SODIUM CHLORIDE 0.9 % (FLUSH) 0.9 %
10 SYRINGE (ML) INJECTION EVERY 12 HOURS SCHEDULED
Status: DISCONTINUED | OUTPATIENT
Start: 2023-08-08 | End: 2023-08-08 | Stop reason: HOSPADM

## 2023-08-08 RX ORDER — IPRATROPIUM BROMIDE AND ALBUTEROL SULFATE 2.5; .5 MG/3ML; MG/3ML
3 SOLUTION RESPIRATORY (INHALATION) ONCE AS NEEDED
Status: DISCONTINUED | OUTPATIENT
Start: 2023-08-08 | End: 2023-08-08 | Stop reason: HOSPADM

## 2023-08-08 RX ORDER — MEPERIDINE HYDROCHLORIDE 25 MG/ML
12.5 INJECTION INTRAMUSCULAR; INTRAVENOUS; SUBCUTANEOUS
Status: DISCONTINUED | OUTPATIENT
Start: 2023-08-08 | End: 2023-08-08 | Stop reason: HOSPADM

## 2023-08-08 RX ADMIN — SODIUM CHLORIDE, POTASSIUM CHLORIDE, SODIUM LACTATE AND CALCIUM CHLORIDE: 600; 310; 30; 20 INJECTION, SOLUTION INTRAVENOUS at 11:11

## 2023-08-08 RX ADMIN — PROPOFOL 100 MG: 10 INJECTION, EMULSION INTRAVENOUS at 11:15

## 2023-08-08 NOTE — ANESTHESIA PREPROCEDURE EVALUATION
Anesthesia Evaluation     Patient summary reviewed and Nursing notes reviewed   no history of anesthetic complications:   NPO Solid Status: > 8 hours  NPO Liquid Status: > 8 hours           Airway   Mallampati: II  TM distance: >3 FB  Neck ROM: full  Dental    (+) lower dentures and upper dentures    Pulmonary     breath sounds clear to auscultation  (+) COPD,  Cardiovascular   Exercise tolerance: good (4-7 METS)    Rhythm: regular  Rate: normal    (+) hypertensionhyperlipidemia      Neuro/Psych  (+) psychiatric history Anxiety  GI/Hepatic/Renal/Endo    (+) GERD, hepatitis, liver disease (liver cancer)    Musculoskeletal (-) negative ROS    Abdominal     Abdomen: soft.   Substance History - negative use     OB/GYN negative ob/gyn ROS         Other      history of cancer active    ROS/Med Hx Other: Hepatocellular cancer.                  Anesthesia Plan    ASA 4     general     intravenous induction     Anesthetic plan, risks, benefits, and alternatives have been provided, discussed and informed consent has been obtained with: patient.  Pre-procedure education provided  Use of blood products discussed with  Consented to blood products.    Plan discussed with CRNA.      CODE STATUS:

## 2023-08-08 NOTE — ANESTHESIA POSTPROCEDURE EVALUATION
Patient: Noemy Brooks    Procedure Summary       Date: 08/08/23 Room / Location:  [facility]  /  [facility]    Anesthesia Start: 1111 Anesthesia Stop:     Procedure: ESOPHAGOGASTRODUODENOSCOPY WITH BIOPSY (Esophagus) Diagnosis:       Esophageal dysphagia      (Esophageal dysphagia [R13.19])    Surgeons: Rahel Sesay MD Provider: Fam Palma CRNA    Anesthesia Type: general ASA Status: 4            Anesthesia Type: general    Vitals  Vitals Value Taken Time   /74 08/08/23 1148   Temp 98 øF (36.7 øC) 08/08/23 1148   Pulse 63 08/08/23 1148   Resp 20 08/08/23 1148   SpO2 93 % 08/08/23 1148           Post Anesthesia Care and Evaluation    Patient location during evaluation: PACU  Patient participation: complete - patient participated  Level of consciousness: awake  Pain score: 0  Pain management: adequate    Airway patency: patent  Anesthetic complications: No anesthetic complications  PONV Status: none  Cardiovascular status: hemodynamically stable  Respiratory status: nasal cannula  Hydration status: acceptable

## 2023-08-08 NOTE — H&P
[facility]    Patient Identification:  Name:  Noemy Brooks  Age:  63 y.o.  Sex:  female  :  1960  MRN:  [id number]   Visit Number:  [id number]  Primary Care Physician:  Haley Bailey DO     Chief complaint: Dysphagia, cirrhosis of liver    History of presenting illness: Ms. Brooks is a 63 y.o. female who presents today for EGD for evaluation of possible esophageal varices and dysphagia. Ms. Brooks reports previously being treated for HCV and was cleared. She has history of hepatocellular carcinoma and follows with oncology, Dr. Boss. She received initial treatment at  and currently receives r9dyhsjj atezolizumab. She underwent an MRI of the abdomen in 2023 which revealed no solid arterial phase enhancing liver lesions and no delayed phase enhancing liver lesions. Also noted liver cirrhosis and portal hypertension, 0.6 cm simple appearing benign hepatic cyst right lobe and simple appearing cyst right kidney.  AFP was normal 23. At present, she denies obvious bleeding from any source. She complains of dysphagia which occurs particularly in the morning and improves during the day. She reports difficulty swallowing her morning medications. Denies dysphagia with solids or liquids. She has no other complaints today.       Review of Systems   Constitutional: Negative.    HENT:  Positive for trouble swallowing.    Eyes: Negative.    Respiratory: Negative.     Cardiovascular: Negative.    Gastrointestinal:  Negative for abdominal distention, abdominal pain, anal bleeding, blood in stool, constipation, diarrhea, nausea, rectal pain and vomiting.   Endocrine: Negative.    Genitourinary: Negative.    Musculoskeletal: Negative.    Allergic/Immunologic: Negative.    Neurological: Negative.    Hematological: Negative.       Past Medical History:   Diagnosis Date    Acid reflux     Aortic aneurysm     Cancer     LIVER, UTERINE    Cirrhosis      COPD (chronic obstructive pulmonary disease)     Depression     Elevated cholesterol     High cholesterol     History of transfusion     Hypertension     Infectious viral hepatitis     2019    Ovarian cancer      Past Surgical History:   Procedure Laterality Date    APPENDECTOMY N/A     COLONOSCOPY      FRACTURE SURGERY Left     ARM, HAS 2 SCREWS    HYSTERECTOMY      UPPER GASTROINTESTINAL ENDOSCOPY       Family History   Problem Relation Age of Onset    Stroke Mother     Hypertension Mother     COPD Mother     Throat cancer Mother     Arthritis Mother     Asthma Mother     Heart attack Mother     Stroke Father     Cancer Father     Liver cancer Father      Social History     Socioeconomic History    Marital status:     Number of children: 3   Tobacco Use    Smoking status: Former     Types: Cigarettes     Quit date: 2015     Years since quittin.2    Smokeless tobacco: Never    Tobacco comments:     smoked for approx 20 years, 1.5 to 2 ppd   Vaping Use    Vaping Use: Never used   Substance and Sexual Activity    Alcohol use: No     Comment: she previously drank daily for 16 years, beer    Drug use: No    Sexual activity: Defer       Allergies:  Codeine    Prior to Admission Medications       Prescriptions Last Dose Informant Patient Reported? Taking?    budesonide-formoterol (SYMBICORT) 160-4.5 MCG/ACT inhaler   Yes No    Inhale 2 puffs 2 (Two) Times a Day.    cholecalciferol (VITAMIN D3) 25 MCG (1000 UT) tablet   Yes No    Take 1 tablet by mouth Daily.    cyclobenzaprine (FLEXERIL) 5 MG tablet   Yes No    Take 1 tablet by mouth 3 (Three) Times a Day As Needed for Muscle Spasms.    diphenhydrAMINE 12.5 MG/5ML elixir 20 mL, aluminum-magnesium hydroxide-simethicone 400-400-40 MG/5ML suspension 20 mL, Lidocaine Viscous HCl 2 % solution 20 mL   Yes No    Swish and spit 15 mL Every 4 (Four) Hours As Needed.    dronabinol (Marinol) 2.5 MG capsule   No No    Take 1 capsule by mouth 2 (Two) Times a Day Before  "Meals.    famotidine (PEPCID) 40 MG tablet   Yes No    Take 1 tablet by mouth Daily.    levothyroxine (SYNTHROID, LEVOTHROID) 25 MCG tablet   No No    Take 1 tablet by mouth Every Morning.    lisinopril (PRINIVIL,ZESTRIL) 30 MG tablet   Yes No    Take 1 tablet by mouth Daily.    lisinopril-hydrochlorothiazide (PRINZIDE,ZESTORETIC) 20-12.5 MG per tablet   Yes No    Take 1 tablet by mouth Daily.    pantoprazole (PROTONIX) 40 MG EC tablet   Yes No    Take 1 tablet by mouth Daily.    traZODone (DESYREL) 100 MG tablet   Yes No    Take 1 tablet by mouth Every Night.    vitamin D (ERGOCALCIFEROL) 1.25 MG (66932 UT) capsule capsule   Yes No    Take 1 capsule by mouth 1 (One) Time Per Week.          Hospital Scheduled Meds:    No current facility-administered medications for this encounter.      Vital Signs:     Vitals:    08/08/23 1005   BP: 140/84   BP Location: Right arm   Patient Position: Lying   Pulse: 62   Resp: 20   Temp: 97.8 øF (36.6 øC)   TempSrc: Oral   SpO2: 98%   Weight: 60.3 kg (133 lb)   Height: 162.6 cm (64\")      Body mass index is 22.83 kg/mý.    Physical Exam:  Constitutional:  Alert and oriented. Well developed and well nourished, in no acute distress.  HENT:  Head: Normocephalic and atraumatic.  Mouth:  Moist mucous membranes.  OP clear, mmm  Eyes:  Conjunctivae and EOM are normal.  Pupils are equal, round, and reactive to light.  No scleral icterus.  Neck:  Neck supple.  No JVD present.    Cardiovascular:  RRR, no MRG.  Pulmonary/Chest:  CTAB, unlabored.   Abdominal:  Soft.  Bowel sounds are normal.  No distension and no tenderness.   Musculoskeletal:  No edema, no tenderness, and no deformity.   Neurological:  MS as above, grossly nonfocal exam   Psychiatric:  Normal mood and affect.  Behavior is normal.  Judgment and thought content normal.     Assessment and Plan:  Proceed with EGD for evaluation of possible esophageal varices and dysphagia.     Mili Alston, VIRGEN  08/08/23  09:48 EDT  "

## 2023-08-14 ENCOUNTER — NURSE TRIAGE (OUTPATIENT)
Dept: CALL CENTER | Facility: HOSPITAL | Age: 63
End: 2023-08-14
Payer: MEDICARE

## 2023-08-14 NOTE — TELEPHONE ENCOUNTER
"As below, caller with newly noted reddened area on left buttock just next to gluteal cleft. States is hard to see directly but can tell is about quarter size or larger and is very itchy. Drains yellowish clear fluid when scratches.    Per Epic chart, patient with history of Hepatocellular CA, receiving Tecentriq infusions.    Guidelines followed, protocols reviewed. Caller to call PCP or Oncologist for further evaluation, treatment today.  Caller has both numbers but states will have to find  first.    Reason for Disposition   Looks infected (e.g., spreading redness, pus) and no fever    Additional Information   Negative: Sounds like a life-threatening emergency to the triager   Negative: Followed a burn   Negative: Followed an injury to the skin (such as a cut or scrape)   Negative: Boil (abscess) suspected (painful red lump)   Negative: Widespread rash or redness   Negative: Cold sore suspected (i.e., fever blister sore on the outer lip)   Negative: Insect bite(s) suspected   Negative: Poison ivy, oak, or sumac rash suspected (e.g., itchy rash after contact with poison ivy)   Negative: Sore(s) on female genital area  (e.g., labia, vagina, vulva)   Negative: Sore(s) on male genital area (e.g., penis, scrotum)   Negative: Wound infection suspected (in traumatic or surgical wound)   Negative: Black (necrotic), dark purple, or blisters develop in area of sore   Negative: Patient sounds very sick or weak to the triager   Negative: Looks infected (e.g., spreading redness, pus) and fever   Negative: Looks infected and large red area (> 2 inches or 5 cm) or streak   Negative: Ulcer with black scab that is spreading, painless and cause unknown    Answer Assessment - Initial Assessment Questions  1. APPEARANCE of SORES: \"What do the sores look like?\"      Bright reddened area  2. NUMBER: \"How many sores are there?\"      1  3. SIZE: \"How big is the largest sore?\"      Quarter size  4. LOCATION: \"Where are the sores " "located?\"      Left buttock just next to gluteal cleft  5. ONSET: \"When did the sores begin?\"      Noticed this a.m.  6. TENDER: \"Does it hurt when you touch it?\"  (Scale 1-10; or mild, moderate, severe)       Tender and extremely itchy  7. CAUSE: \"What do you think is causing the sores?\"      Unknown, but \"maybe it's a spider bite\"  8. OTHER SYMPTOMS: \"Do you have any other symptoms?\" (e.g., fever, new weakness)      Denies fever. Sore is in area that she cannot see directly. States draining yellowish drainage when she scratches,    Protocols used: Sores-ADULT-[place]    "

## 2023-08-16 ENCOUNTER — LAB (OUTPATIENT)
Dept: ONCOLOGY | Facility: CLINIC | Age: 63
End: 2023-08-16
Payer: MEDICARE

## 2023-08-16 ENCOUNTER — INFUSION (OUTPATIENT)
Dept: ONCOLOGY | Facility: HOSPITAL | Age: 63
End: 2023-08-16
Payer: MEDICARE

## 2023-08-16 ENCOUNTER — OFFICE VISIT (OUTPATIENT)
Dept: ONCOLOGY | Facility: CLINIC | Age: 63
End: 2023-08-16
Payer: MEDICARE

## 2023-08-16 VITALS
DIASTOLIC BLOOD PRESSURE: 70 MMHG | HEART RATE: 69 BPM | RESPIRATION RATE: 18 BRPM | WEIGHT: 137.6 LBS | HEIGHT: 65 IN | TEMPERATURE: 97.5 F | OXYGEN SATURATION: 94 % | BODY MASS INDEX: 22.92 KG/M2 | SYSTOLIC BLOOD PRESSURE: 130 MMHG

## 2023-08-16 VITALS
DIASTOLIC BLOOD PRESSURE: 70 MMHG | TEMPERATURE: 97.5 F | SYSTOLIC BLOOD PRESSURE: 129 MMHG | HEART RATE: 69 BPM | RESPIRATION RATE: 18 BRPM | OXYGEN SATURATION: 94 %

## 2023-08-16 DIAGNOSIS — Z79.899 LONG-TERM USE OF HIGH-RISK MEDICATION: ICD-10-CM

## 2023-08-16 DIAGNOSIS — C22.0 HEPATOCELLULAR CARCINOMA: Primary | ICD-10-CM

## 2023-08-16 DIAGNOSIS — C22.0 HEPATOCELLULAR CARCINOMA: ICD-10-CM

## 2023-08-16 LAB
ALBUMIN SERPL-MCNC: 3.7 G/DL (ref 3.5–5.2)
ALBUMIN/GLOB SERPL: 1.2 G/DL
ALP SERPL-CCNC: 70 U/L (ref 39–117)
ALPHA-FETOPROTEIN: 3.96 NG/ML (ref 0–8.3)
ALT SERPL W P-5'-P-CCNC: 32 U/L (ref 1–33)
ANION GAP SERPL CALCULATED.3IONS-SCNC: 8.9 MMOL/L (ref 5–15)
AST SERPL-CCNC: 37 U/L (ref 1–32)
BASOPHILS # BLD AUTO: 0.07 10*3/MM3 (ref 0–0.2)
BASOPHILS NFR BLD AUTO: 1.2 % (ref 0–1.5)
BILIRUB SERPL-MCNC: 0.5 MG/DL (ref 0–1.2)
BILIRUB UR QL STRIP: NEGATIVE
BUN SERPL-MCNC: 9 MG/DL (ref 8–23)
BUN/CREAT SERPL: 14.8 (ref 7–25)
CALCIUM SPEC-SCNC: 9.9 MG/DL (ref 8.6–10.5)
CHLORIDE SERPL-SCNC: 112 MMOL/L (ref 98–107)
CLARITY UR: CLEAR
CO2 SERPL-SCNC: 25.1 MMOL/L (ref 22–29)
COLOR UR: ABNORMAL
CREAT SERPL-MCNC: 0.61 MG/DL (ref 0.57–1)
DEPRECATED RDW RBC AUTO: 54.2 FL (ref 37–54)
EGFRCR SERPLBLD CKD-EPI 2021: 99.4 ML/MIN/1.73
EOSINOPHIL # BLD AUTO: 0.51 10*3/MM3 (ref 0–0.4)
EOSINOPHIL NFR BLD AUTO: 8.7 % (ref 0.3–6.2)
ERYTHROCYTE [DISTWIDTH] IN BLOOD BY AUTOMATED COUNT: 14.3 % (ref 12.3–15.4)
GLOBULIN UR ELPH-MCNC: 3.2 GM/DL
GLUCOSE SERPL-MCNC: 91 MG/DL (ref 65–99)
GLUCOSE UR STRIP-MCNC: NEGATIVE MG/DL
HCT VFR BLD AUTO: 44.2 % (ref 34–46.6)
HGB BLD-MCNC: 14.3 G/DL (ref 12–15.9)
HGB UR QL STRIP.AUTO: NEGATIVE
IMM GRANULOCYTES # BLD AUTO: 0.01 10*3/MM3 (ref 0–0.05)
IMM GRANULOCYTES NFR BLD AUTO: 0.2 % (ref 0–0.5)
KETONES UR QL STRIP: NEGATIVE
LEUKOCYTE ESTERASE UR QL STRIP.AUTO: NEGATIVE
LYMPHOCYTES # BLD AUTO: 1.44 10*3/MM3 (ref 0.7–3.1)
LYMPHOCYTES NFR BLD AUTO: 24.5 % (ref 19.6–45.3)
MCH RBC QN AUTO: 33.3 PG (ref 26.6–33)
MCHC RBC AUTO-ENTMCNC: 32.4 G/DL (ref 31.5–35.7)
MCV RBC AUTO: 102.8 FL (ref 79–97)
MONOCYTES # BLD AUTO: 0.77 10*3/MM3 (ref 0.1–0.9)
MONOCYTES NFR BLD AUTO: 13.1 % (ref 5–12)
NEUTROPHILS NFR BLD AUTO: 3.07 10*3/MM3 (ref 1.7–7)
NEUTROPHILS NFR BLD AUTO: 52.3 % (ref 42.7–76)
NITRITE UR QL STRIP: NEGATIVE
NRBC BLD AUTO-RTO: 0 /100 WBC (ref 0–0.2)
PH UR STRIP.AUTO: 7 [PH] (ref 5–8)
PLATELET # BLD AUTO: 120 10*3/MM3 (ref 140–450)
PMV BLD AUTO: 9.6 FL (ref 6–12)
POTASSIUM SERPL-SCNC: 4.5 MMOL/L (ref 3.5–5.2)
PROT SERPL-MCNC: 6.9 G/DL (ref 6–8.5)
PROT UR QL STRIP: NEGATIVE
RBC # BLD AUTO: 4.3 10*6/MM3 (ref 3.77–5.28)
SODIUM SERPL-SCNC: 146 MMOL/L (ref 136–145)
SP GR UR STRIP: 1.01 (ref 1–1.03)
TSH SERPL DL<=0.05 MIU/L-ACNC: 4.6 UIU/ML (ref 0.27–4.2)
UROBILINOGEN UR QL STRIP: ABNORMAL
WBC NRBC COR # BLD: 5.87 10*3/MM3 (ref 3.4–10.8)

## 2023-08-16 PROCEDURE — 85025 COMPLETE CBC W/AUTO DIFF WBC: CPT | Performed by: INTERNAL MEDICINE

## 2023-08-16 PROCEDURE — 1126F AMNT PAIN NOTED NONE PRSNT: CPT | Performed by: INTERNAL MEDICINE

## 2023-08-16 PROCEDURE — 25010000002 ATEZOLIZUMAB 1200 MG/20ML SOLUTION 20 ML VIAL: Performed by: INTERNAL MEDICINE

## 2023-08-16 PROCEDURE — 81003 URINALYSIS AUTO W/O SCOPE: CPT | Performed by: INTERNAL MEDICINE

## 2023-08-16 PROCEDURE — 96413 CHEMO IV INFUSION 1 HR: CPT

## 2023-08-16 PROCEDURE — 84443 ASSAY THYROID STIM HORMONE: CPT | Performed by: INTERNAL MEDICINE

## 2023-08-16 PROCEDURE — 82105 ALPHA-FETOPROTEIN SERUM: CPT | Performed by: INTERNAL MEDICINE

## 2023-08-16 PROCEDURE — 80053 COMPREHEN METABOLIC PANEL: CPT | Performed by: INTERNAL MEDICINE

## 2023-08-16 PROCEDURE — 99214 OFFICE O/P EST MOD 30 MIN: CPT | Performed by: INTERNAL MEDICINE

## 2023-08-16 RX ORDER — SODIUM CHLORIDE 0.9 % (FLUSH) 0.9 %
10 SYRINGE (ML) INJECTION AS NEEDED
Status: DISCONTINUED | OUTPATIENT
Start: 2023-08-16 | End: 2023-08-16 | Stop reason: HOSPADM

## 2023-08-16 RX ORDER — SODIUM CHLORIDE 0.9 % (FLUSH) 0.9 %
10 SYRINGE (ML) INJECTION AS NEEDED
OUTPATIENT
Start: 2023-08-16

## 2023-08-16 RX ORDER — HEPARIN SODIUM (PORCINE) LOCK FLUSH IV SOLN 100 UNIT/ML 100 UNIT/ML
500 SOLUTION INTRAVENOUS AS NEEDED
OUTPATIENT
Start: 2023-08-16

## 2023-08-16 RX ORDER — SODIUM CHLORIDE 9 MG/ML
250 INJECTION, SOLUTION INTRAVENOUS ONCE
Status: COMPLETED | OUTPATIENT
Start: 2023-08-16 | End: 2023-08-16

## 2023-08-16 RX ORDER — HEPARIN SODIUM (PORCINE) LOCK FLUSH IV SOLN 100 UNIT/ML 100 UNIT/ML
500 SOLUTION INTRAVENOUS AS NEEDED
Status: DISCONTINUED | OUTPATIENT
Start: 2023-08-16 | End: 2023-08-16 | Stop reason: HOSPADM

## 2023-08-16 RX ADMIN — ATEZOLIZUMAB 1200 MG: 1200 INJECTION, SOLUTION INTRAVENOUS at 11:05

## 2023-08-16 RX ADMIN — SODIUM CHLORIDE 250 ML: 9 INJECTION, SOLUTION INTRAVENOUS at 11:05

## 2023-08-16 NOTE — PROGRESS NOTES
Name:  Noemy Brooks  :  1960  Date:  2023     REFERRING PHYSICIAN  Henri Melvin MD    PRIMARY CARE PHYSICIAN  Lynn, Haley Roca DO    REASON FOR FOLLOWUP  1. Hepatocellular carcinoma      CHIEF COMPLAINT  Persistent RUQ pain.    Dear Dr. Bailey,    HISTORY OF PRESENT ILLNESS:   I saw Ms. Brooks in followup today in our medical oncology clinic. As you are aware, she is a pleasant, 63 y.o., white female with a history of hypertension, hepatitis C (treated with antivirals and reportedly cured in ~), cirrhosis and hepatocellular carcinoma who was initially diagnosed with the latter in ~2022. [place], [place]. Her [facility] gastro[facility]ologist referred her to  at that time once she was found to have a couple of liver masses and a serum AFP > 1,000 ng/mL. She was evaluated by the liver transplant service; however, due to the HCC's involvement of the hepatic vessels, she was felt to not be a candidate (for transplant). She was subsequently evaluated by  medical oncology, who recommended starting first-line, palliative treatment with a combination of t5wqctpp bevacizumab (Mvasi) and atezolizumab (Tecentriq), per the current standard of care. She received a total of eight (8) cycles between 2022 and early 2023 through the [facility]. Due to transportation issues (her sister has to be the one to drive her, and the trips to [place] have been getting increasingly difficult for her), she presented to our clinic in 2023 in order to transfer her ongoing, oncologic care to us, closer to her home in [place], [place].    INTERIM HISTORY:  Ms. Brooks returns to clinic today for follow up by herself. She continues to tolerate c8qqlsfk atezolizumab very well, without any noticeable side effects; and she has now received a total of thirteen (13) cycles to date. She previously stated that the Lord cured her once before (of Hepatitis C), and she has recently become convinced that he  "has/will do it again (this time for her HCC). Her primary, and essentially only, complaint today is that she has still been having persistent RUQ/midepigastric pain. She swears that she was previously told that her gallbladder was \"not normal\", but that she was not a candidate for surgery (?). She has no other specific complaints.    Past Medical History:   Diagnosis Date    Acid reflux     Aortic aneurysm     Cancer     LIVER, UTERINE    Cirrhosis     COPD (chronic obstructive pulmonary disease)     Depression     Elevated cholesterol     High cholesterol     History of transfusion     Hypertension     Infectious viral hepatitis     2019    Ovarian cancer        Past Surgical History:   Procedure Laterality Date    APPENDECTOMY N/A     COLONOSCOPY      ENDOSCOPY N/A 2023    Procedure: ESOPHAGOGASTRODUODENOSCOPY WITH BIOPSY;  Surgeon: Rahel Sesay MD;  Location: [facility];  Service: Gastroenterology;  Laterality: N/A;    FRACTURE SURGERY Left     ARM, HAS 2 SCREWS    HYSTERECTOMY      UPPER GASTROINTESTINAL ENDOSCOPY         Social History     Socioeconomic History    Marital status:     Number of children: 3   Tobacco Use    Smoking status: Former     Types: Cigarettes     Quit date: 2015     Years since quittin.2    Smokeless tobacco: Never    Tobacco comments:     smoked for approx 20 years, 1.5 to 2 ppd   Vaping Use    Vaping Use: Never used   Substance and Sexual Activity    Alcohol use: No     Comment: she previously drank daily for 16 years, beer    Drug use: No    Sexual activity: Defer       Family History   Problem Relation Age of Onset    Stroke Mother     Hypertension Mother     COPD Mother     Throat cancer Mother     Arthritis Mother     Asthma Mother     Heart attack Mother     Stroke Father     Cancer Father     Liver cancer Father        Allergies   Allergen Reactions    Codeine GI Intolerance       Current Outpatient Medications   Medication Sig Dispense Refill    " albuterol sulfate  (90 Base) MCG/ACT inhaler Inhale 2 puffs Every 4 (Four) Hours As Needed for Wheezing.      Atezolizumab (TECENTRIQ IV) Infuse  into a venous catheter Every 21 (Twenty-One) Days.      B Complex Vitamins (VITAMIN B COMPLEX PO) Take  by mouth.      budesonide-formoterol (SYMBICORT) 160-4.5 MCG/ACT inhaler Inhale 2 puffs 2 (Two) Times a Day.      cholecalciferol (VITAMIN D3) 25 MCG (1000 UT) tablet Take 1 tablet by mouth Daily.      levothyroxine (SYNTHROID, LEVOTHROID) 25 MCG tablet Take 1 tablet by mouth Every Morning. 30 tablet 1    lisinopril (PRINIVIL,ZESTRIL) 30 MG tablet Take 1 tablet by mouth Daily.      Magnesium Hydroxide (MILK OF MAGNESIA PO) Take  by mouth.      omeprazole (priLOSEC) 20 MG capsule Take 1 capsule by mouth Daily.      traZODone (DESYREL) 100 MG tablet Take 1 tablet by mouth Every Night.      cyclobenzaprine (FLEXERIL) 5 MG tablet Take 1 tablet by mouth 3 (Three) Times a Day As Needed for Muscle Spasms. (Patient not taking: Reported on 8/16/2023)      diphenhydrAMINE 12.5 MG/5ML elixir 20 mL, aluminum-magnesium hydroxide-simethicone 400-400-40 MG/5ML suspension 20 mL, Lidocaine Viscous HCl 2 % solution 20 mL Swish and spit 15 mL Every 4 (Four) Hours As Needed. (Patient not taking: Reported on 8/16/2023)      dronabinol (Marinol) 2.5 MG capsule Take 1 capsule by mouth 2 (Two) Times a Day Before Meals. (Patient not taking: Reported on 8/16/2023) 60 capsule 2    lisinopril-hydrochlorothiazide (PRINZIDE,ZESTORETIC) 20-12.5 MG per tablet Take 1 tablet by mouth Daily. (Patient not taking: Reported on 8/16/2023)       No current facility-administered medications for this visit.     REVIEW OF SYSTEMS  CONSTITUTIONAL:  No fever, chills, night sweats or fatigue.  EYES:  No blurry vision, diplopia or other vision changes.  ENT:  No hearing loss, nosebleeds or sore throat.  CARDIOVASCULAR:  No palpitations, arrhythmia, syncopal episodes or edema.  PULMONARY:  No hemoptysis,  "wheezing, chronic cough or shortness of breath.  GASTROINTESTINAL:  As per the HPI above.  GENITOURINARY:  No hematuria, kidney stones or frequent urination.  MUSCULOSKELETAL:  No joint or back pains.  INTEGUMENTARY: No rashes or pruritus.  ENDOCRINE:  No excessive thirst or hot flashes.  HEMATOLOGIC:  No history of free bleeding, spontaneous bleeding or clotting.  IMMUNOLOGIC:  No allergies or frequent infections.  NEUROLOGIC: No numbness, tingling, seizures or weakness.  PSYCHIATRIC:  No anxiety or depression.    PHYSICAL EXAMINATION  /70   Pulse 69   Temp 97.5 øF (36.4 øC) (Temporal)   Resp 18   Ht 165.1 cm (65\")   Wt 62.4 kg (137 lb 9.6 oz)   SpO2 94%   BMI 22.90 kg/mý     Pain Score:  Pain Score    23 0922   PainSc: 0-No pain         PHQ-Score Total:  PHQ-9 Total Score:      ECO  GENERAL:  A well-developed, well-nourished, thin, white female in no acute distress.  HEENT:  Pupils equally round and reactive to light. Extraocular muscles intact.  CARDIOVASCULAR:  Regular rate and rhythm. No murmurs, gallops or rubs.  LUNGS:  Clear to auscultation bilaterally.  ABDOMEN:  Soft, nontender, nondistended with positive bowel sounds.  EXTREMITIES:  No clubbing, cyanosis or edema bilaterally.  SKIN:  No rashes or petechiae.  NEURO:  Cranial nerves grossly intact. No focal deficits.  PSYCH:  Alert and oriented x3.    The physical exam is yet again unchanged from recent priors.    LABORATORY  Lab Results   Component Value Date    WBC 5.87 2023    HGB 14.3 2023    HCT 44.2 2023    .8 (H) 2023     (L) 2023    NEUTROABS 3.07 2023       Lab Results   Component Value Date     (H) 2023    K 4.5 2023     (H) 2023    CO2 25.1 2023    BUN 9 2023    CREATININE 0.61 2023    GLUCOSE 91 2023    CALCIUM 9.9 2023    AST 37 (H) 2023    ALT 32 2023    ALKPHOS 70 2023    BILITOT 0.5 2023 "    PROTEINTOT 6.9 08/16/2023    ALBUMIN 3.7 08/16/2023     CBC (08/16/2023): WBCs: 5.87; HgB: 14.3; Hct: 44.2; platelets: 120  CBC (05/24/2023): WBCs: 7.45; HgB: 15.8; Hct: 47.0; platelets: 125  CBC (05/01/2023): WBCs: 6.91; HgB: 15.9; Hct: 47.7; platelets: 118  CBC (04/03/2023): WBCs: 6.68; HgB: 15.9; Hct: 45.7; platelets: 116; MCV: 94    AFP (08/16/2023): pending  AFP (07/06/2023): 3.33 ng/mL  AFP (05/24/2023): 2.66 ng/mL  AFP (05/01/2023): < 2 ng/mL  AFP (01/27/2023): 2.6 ng/mL  AFP (10/31/2022): 1031.0 ng/mL    IMAGING  CT liver (10/21/2022, at ):  Impression: LR 5 segment 7 lesion measuring up to 2.8 x 2.5 cm with associated TIV (tumor in vein). LR 3 legion measuring 8 mm in segment 4A.    CT chest, abdomen and pelvis with contrast (01/27/2023, at ):  Impression:  Chest: No convincing metastatic disease in the thorax.  Abdomen/Pelvis: Within the limits of the study, the area of washout representing the known HCC is stable to smaller in size. The component representing the tumor in vein is smaller in size. No new liver lesions. No distant metastatic disease.    MRI abdomen with and without contrast (04/13/2023):  Impression:  1) No solid arterial phase enhancing liver lesions identified. No delayed phase enhancing liver lesions are noted.  2) Liver cirrhosis and portal hypertension.  3) 0.6 cm simple appearing benign hepatic cyst right lobe.  4) Simple appearing cyst right kidney. No hydronephrosis.  5) Other nonacute findings.    PATHOLOGY    IMPRESSION AND PLAN  Ms. Brooks is a 63 y.o., white female with:  Hepatocellular carcinoma: Initially diagnosed in Fall 2022 after a CT of the liver (performed on 10/21/2022 at , summarized above) confirmed the presence of two lesions (one measuring 2.8 x 2.5 cm in segment 7 and a probable satellite measuring ~8 mm in segment 4A) in the setting of known, baseline cirrhosis and a serum AFP level of > 1000 ng/mL. Due to venous involvement by the tumor, she was/is not  a candidate for a liver transplant. [facility] medical oncology therefore recommended initiating first-line, palliative, systemic treatment with a combination of g0nzawzm bevacizumab (the Avastin biosimilar Mvasi) and m0dykiqm atezolizumab (Tecentriq). She received a total of eight (8), z3jnuyaz cycles through the [facility] between Fall 2022 and early April 2023 (the eighth and final cycle of both were given on 04/03/2023). With this therapy, the most recent imaging, an MRI of the abdomen (liver protocol) performed on 04/13/2023 (and summarized above) showed no visible signs of a liver mass at all; and, meanwhile, with the therapy she has received to date, her serum AFP level has declined from > 1000 ng/mL in late October 2023 to solidly WNL (2.6 ng/mL) as of late January 2023 (and also on the most recent recheck, 3.33 ng/mL on 07/06/2023; today's repeat result is pending), consistent with a complete response in her disease. I therefore had a long discussion with the patient and her sister in Spring 2023 regarding our treatment recommendations from that point. In short, the potential risks of any additional cycles of Avastin (particularly bleeding; she has an aortic aneurysm in addition to her baseline cirrhosis) likely outweighed, and still outweighs, its marginal (and, at this time, probably nonexistent), potential benefit (at least at this time); however, continuing indefinite, w2dthrvq atezolizumab continues to be recommended (as the current standard of care; it appears to have worked extremely well). She remains agreeable to proceeding (with the atezolizumab) alone; and she has now completed a total of thirteen (13) cycles. We will proceed with this current treatment plan (fourteenth cycle of atezolizumab today). We will see her back in our clinic in three months, on the day of the eighteenth cycle of atezolizumab (in mid-September), with a CBC, CMP, TSH, AFP and repeat MRI of the liver with and without  "contrast.  Cirrhosis: Likely secondary to a longstanding history of both issue #3 (which was diagnosed and reportedly cured in ~2018, probably decades after she initially contracted it through her ) and alcohol abuse (she drank \"a lot\" of beer every day for ~twenty years but quit in the ). Currently still compensated. Ongoing management per gastroenterology/hepatology.  Hepatitis C: Reportedly contracted through her  (who ultimately  from it) without her knowledge, but also reportedly diagnosed and cured with a months-long course of antivirals in ~. Ongoing management per gastroenterology/hepatology.  Protein calorie malnutrition: Recently improved. Continue Marinol 2.5 mg PO BID. Continue to monitor.  Gallbladder issues: Per the patient, shortly before her diagnosis with issue #1 last year (), her PCP found, at a minimum, some gallstones following an evaluation of her gallbladder; but she states that she was also told that she was not a surgical candidate due to her risk of bleeding. With her ongoing RUQ/epigastric pains, recent negative EGD and, now, the fact that she is doing extremely well from the standpoint of issue #1 (see above), per her request, we will refer her to local surgery for further evaluation.  Aortic aneurysm: Her PCP is reportedly working on referring her to a vascular/CT surgeon to monitor this issue.  The patient was in agreement with these plans.    It is a pleasure to participate in Ms. Brooks's care. Please do not hesitate to call with any questions or concerns that you may have.    A total of 30 minutes were spent coordinating this patient's care in clinic today; more than 50% of this time was face-to-face with the patient, reviewing her interim medical history and counseling on the current treatment and followup plan. All questions were answered to her satisfaction.    FOLLOW UP  Referral placed to local surgery, per patient request, re: evaluate and treat " persistent RUQ pain/history of gallstones. Continue Marinol 2.5 mg PO BID. Proceed with q5xihusz atezolizumab, as planned (14th, w8pqwpxs cycle today). Repeat an AFP in 6 weeks. Return to our clinic in 3 months (~mid-November), on the day of the 18th overall cycle of atezolizumab, with a CBC, CMP, TSH, AFP and repeat MRI of the abdomen with and without contrast.            This document was electronically signed by NADEGE Boss MD August 16, 2023 09:42 EDT      CC: DO Henri Davis MD Hao Zhonglin, MD

## 2023-08-16 NOTE — PROGRESS NOTES
Venipuncture Blood Specimen Collection  Venipuncture performed in right arm by Ana Montoya MA with good hemostasis. Patient tolerated the procedure well without complications.   08/16/23   Ana Montoya MA

## 2023-08-24 ENCOUNTER — OFFICE VISIT (OUTPATIENT)
Dept: SURGERY | Facility: CLINIC | Age: 63
End: 2023-08-24
Payer: MEDICARE

## 2023-08-24 VITALS
SYSTOLIC BLOOD PRESSURE: 132 MMHG | BODY MASS INDEX: 22.73 KG/M2 | DIASTOLIC BLOOD PRESSURE: 80 MMHG | HEIGHT: 65 IN | WEIGHT: 136.4 LBS

## 2023-08-24 DIAGNOSIS — R10.13 EPIGASTRIC PAIN: Primary | ICD-10-CM

## 2023-08-24 NOTE — PROGRESS NOTES
Date of Service: 8/24/2023    Subjective   Noemy Brooks is a 63 y.o. female is being in consultation today at the request of Haley Bailey DO    Chief Complaint: abdominal pain     Noemy Brooks is a 63 y.o. female who presents with complaints of abdominal pain.  She has a history of cirrhosis with associated portal hypertension, splenomegaly, esophageal varices, and portal hypertension.  She presents today with complaints of vague intermittent epigastric pain, intermittent back pain, and reflux symptoms.  She reports these are intermittent and she is unable to identify a causative agent.  She has previously been evaluated for a cholecystectomy however was told the risk was too high with her diagnosis of cirrhosis.  She was referred here today by her oncology team for repeat evaluation.    Past Medical History:   Diagnosis Date    Acid reflux     Aortic aneurysm     Cancer     LIVER, UTERINE    Cirrhosis     COPD (chronic obstructive pulmonary disease)     Depression     Elevated cholesterol     High cholesterol     History of transfusion     Hypertension     Infectious viral hepatitis     2019    Ovarian cancer        Past Surgical History:   Procedure Laterality Date    APPENDECTOMY N/A     COLONOSCOPY      ENDOSCOPY N/A 8/8/2023    Procedure: ESOPHAGOGASTRODUODENOSCOPY WITH BIOPSY;  Surgeon: Rahel Sesay MD;  [facility];  Service: Gastroenterology;  Laterality: N/A;    FRACTURE SURGERY Left     ARM, HAS 2 SCREWS    HYSTERECTOMY      UPPER GASTROINTESTINAL ENDOSCOPY           Family History   Problem Relation Age of Onset    Stroke Mother     Hypertension Mother     COPD Mother     Throat cancer Mother     Arthritis Mother     Asthma Mother     Heart attack Mother     Stroke Father     Cancer Father     Liver cancer Father          Social History     Socioeconomic History    Marital status:     Number of children: 3   Tobacco Use    Smoking status: Former     Types:  "Cigarettes     Quit date: 2015     Years since quittin.3    Smokeless tobacco: Never    Tobacco comments:     smoked for approx 20 years, 1.5 to 2 ppd   Vaping Use    Vaping Use: Never used   Substance and Sexual Activity    Alcohol use: No     Comment: she previously drank daily for 16 years, beer    Drug use: No    Sexual activity: Defer                Review of Systems   Pertinent items are noted in HPI     Constitutional: No fevers, chills or malaise. No unintentional weight loss   Eyes: Denies visual changes    Cardiovascular: Denies chest pain, palpitations   Respiratory: Denies cough or shortness of breath   Abdominal/Gastrointestinal: Abdominal pain and reflux   Genitourinary: Denies dysuria or hematuria   Musculoskeletal: Denies any chronic joint aches, pains or deformities              Skin: No lesions or rashes   Psychiatric: No recent mood changes   Neurologic: No paresthesias or loss of function        Objective       Physical Exam:      23  1307   Weight: 61.9 kg (136 lb 6.4 oz)    Body mass index is 22.7 kg/mý.  Constitution: /80 (BP Location: Right arm, Patient Position: Sitting, Cuff Size: Adult)   Ht 165.1 cm (65\")   Wt 61.9 kg (136 lb 6.4 oz)   BMI 22.70 kg/mý  . No acute distress  Head: Normocephalic, atraumatic.   Eyes: Aligned without strabismus. Conjunctivae noninjected   Ears, Nose, Mouth: Normal dentition, no lesions noted  CV: Regular rate and rhythm   Respiratory: Symmetric chest expansion. No respiratory distress.   Gastrointestinal:  Soft, nminimally tender in epigastrium, essentially overlying sternum   Skin:  No cyanosis, clubbing or edema bilaterally  Lymphatics: No abnormal cervical or supraclavicular adenopathy  Neurologic: No gross deficits.  Alert and oriented x3  Psychiatric: Normal mood          Assessment   Diagnoses and all orders for this visit:    1. Epigastric pain (Primary)      Noemy Brooks is a 63 y.o. female with intermittent abdominal pain " and diagnosis of severe cirrhosis with secondary features of portal hypertension, splenomegaly, and esophageal varices.  It is possible that her intermittent epigastric symptoms are secondary to symptomatic cholelithiasis, as an MRI from April 2023 does show cholelithiasis.  We discussed that with her diagnosis of cirrhosis, the risk of undergoing elective laparoscopic cholecystectomy is prohibitively high in our hospital setting.  If she felt strongly about pursuing a cholecystectomy for symptom relief, I would recommend that she be evaluated by the hepatobiliary team at the [facility] who would have the resources to care for her postoperatively should she decompensate from her severe cirrhosis.  Treated her sister from the waiting room and reviewed her clinical history as well as my recommendations and both the patient and her sister expressed understanding.  I do not have a MELD documented as I do not have a recent INR in association with a CMP.  She expressed understanding and is not willing to accept the risk of undergoing a cholecystectomy at this time and does not want to be referred to [facility].  I have provided my clinic information and she will call us with any further questions or concerns.         Madeline Mckoy MD  [facility]

## 2023-09-07 ENCOUNTER — LAB (OUTPATIENT)
Dept: ONCOLOGY | Facility: HOSPITAL | Age: 63
End: 2023-09-07
Payer: MEDICARE

## 2023-09-07 ENCOUNTER — INFUSION (OUTPATIENT)
Dept: ONCOLOGY | Facility: HOSPITAL | Age: 63
End: 2023-09-07
Payer: MEDICARE

## 2023-09-07 VITALS
HEART RATE: 72 BPM | TEMPERATURE: 97.2 F | BODY MASS INDEX: 22.49 KG/M2 | HEIGHT: 65 IN | SYSTOLIC BLOOD PRESSURE: 129 MMHG | RESPIRATION RATE: 18 BRPM | OXYGEN SATURATION: 93 % | DIASTOLIC BLOOD PRESSURE: 64 MMHG | WEIGHT: 135 LBS

## 2023-09-07 DIAGNOSIS — C22.0 HEPATOCELLULAR CARCINOMA: ICD-10-CM

## 2023-09-07 DIAGNOSIS — C22.0 HEPATOCELLULAR CARCINOMA: Primary | ICD-10-CM

## 2023-09-07 LAB
ALBUMIN SERPL-MCNC: 3.9 G/DL (ref 3.5–5.2)
ALBUMIN/GLOB SERPL: 1.1 G/DL
ALP SERPL-CCNC: 72 U/L (ref 39–117)
ALT SERPL W P-5'-P-CCNC: 32 U/L (ref 1–33)
ANION GAP SERPL CALCULATED.3IONS-SCNC: 8.3 MMOL/L (ref 5–15)
AST SERPL-CCNC: 37 U/L (ref 1–32)
BASOPHILS # BLD AUTO: 0.07 10*3/MM3 (ref 0–0.2)
BASOPHILS NFR BLD AUTO: 1.1 % (ref 0–1.5)
BILIRUB SERPL-MCNC: 0.4 MG/DL (ref 0–1.2)
BILIRUB UR QL STRIP: NEGATIVE
BUN SERPL-MCNC: 8 MG/DL (ref 8–23)
BUN/CREAT SERPL: 13.6 (ref 7–25)
CALCIUM SPEC-SCNC: 10 MG/DL (ref 8.6–10.5)
CHLORIDE SERPL-SCNC: 112 MMOL/L (ref 98–107)
CLARITY UR: ABNORMAL
CO2 SERPL-SCNC: 23.7 MMOL/L (ref 22–29)
COLOR UR: YELLOW
CREAT SERPL-MCNC: 0.59 MG/DL (ref 0.57–1)
DEPRECATED RDW RBC AUTO: 51.8 FL (ref 37–54)
EGFRCR SERPLBLD CKD-EPI 2021: 100.2 ML/MIN/1.73
EOSINOPHIL # BLD AUTO: 0.36 10*3/MM3 (ref 0–0.4)
EOSINOPHIL NFR BLD AUTO: 5.8 % (ref 0.3–6.2)
ERYTHROCYTE [DISTWIDTH] IN BLOOD BY AUTOMATED COUNT: 13.7 % (ref 12.3–15.4)
GLOBULIN UR ELPH-MCNC: 3.4 GM/DL
GLUCOSE SERPL-MCNC: 93 MG/DL (ref 65–99)
GLUCOSE UR STRIP-MCNC: NEGATIVE MG/DL
HCT VFR BLD AUTO: 43.4 % (ref 34–46.6)
HGB BLD-MCNC: 14.7 G/DL (ref 12–15.9)
HGB UR QL STRIP.AUTO: NEGATIVE
IMM GRANULOCYTES # BLD AUTO: 0.02 10*3/MM3 (ref 0–0.05)
IMM GRANULOCYTES NFR BLD AUTO: 0.3 % (ref 0–0.5)
KETONES UR QL STRIP: NEGATIVE
LEUKOCYTE ESTERASE UR QL STRIP.AUTO: NEGATIVE
LYMPHOCYTES # BLD AUTO: 1.2 10*3/MM3 (ref 0.7–3.1)
LYMPHOCYTES NFR BLD AUTO: 19.4 % (ref 19.6–45.3)
MCH RBC QN AUTO: 34.4 PG (ref 26.6–33)
MCHC RBC AUTO-ENTMCNC: 33.9 G/DL (ref 31.5–35.7)
MCV RBC AUTO: 101.6 FL (ref 79–97)
MONOCYTES # BLD AUTO: 0.67 10*3/MM3 (ref 0.1–0.9)
MONOCYTES NFR BLD AUTO: 10.8 % (ref 5–12)
NEUTROPHILS NFR BLD AUTO: 3.86 10*3/MM3 (ref 1.7–7)
NEUTROPHILS NFR BLD AUTO: 62.6 % (ref 42.7–76)
NITRITE UR QL STRIP: NEGATIVE
NRBC BLD AUTO-RTO: 0 /100 WBC (ref 0–0.2)
PH UR STRIP.AUTO: 7.5 [PH] (ref 5–8)
PLATELET # BLD AUTO: 117 10*3/MM3 (ref 140–450)
PMV BLD AUTO: 9.7 FL (ref 6–12)
POTASSIUM SERPL-SCNC: 3.8 MMOL/L (ref 3.5–5.2)
PROT SERPL-MCNC: 7.3 G/DL (ref 6–8.5)
PROT UR QL STRIP: NEGATIVE
RBC # BLD AUTO: 4.27 10*6/MM3 (ref 3.77–5.28)
SODIUM SERPL-SCNC: 144 MMOL/L (ref 136–145)
SP GR UR STRIP: <=1.005 (ref 1–1.03)
T4 FREE SERPL-MCNC: 1.02 NG/DL (ref 0.93–1.7)
TSH SERPL DL<=0.05 MIU/L-ACNC: 4.06 UIU/ML (ref 0.27–4.2)
UROBILINOGEN UR QL STRIP: ABNORMAL
WBC NRBC COR # BLD: 6.18 10*3/MM3 (ref 3.4–10.8)

## 2023-09-07 PROCEDURE — 85025 COMPLETE CBC W/AUTO DIFF WBC: CPT

## 2023-09-07 PROCEDURE — 80053 COMPREHEN METABOLIC PANEL: CPT

## 2023-09-07 PROCEDURE — 81003 URINALYSIS AUTO W/O SCOPE: CPT

## 2023-09-07 PROCEDURE — 84439 ASSAY OF FREE THYROXINE: CPT

## 2023-09-07 PROCEDURE — 25010000002 ATEZOLIZUMAB 1200 MG/20ML SOLUTION 20 ML VIAL: Performed by: INTERNAL MEDICINE

## 2023-09-07 PROCEDURE — 96413 CHEMO IV INFUSION 1 HR: CPT

## 2023-09-07 PROCEDURE — 84443 ASSAY THYROID STIM HORMONE: CPT

## 2023-09-07 RX ORDER — SODIUM CHLORIDE 9 MG/ML
250 INJECTION, SOLUTION INTRAVENOUS ONCE
Status: COMPLETED | OUTPATIENT
Start: 2023-09-07 | End: 2023-09-07

## 2023-09-07 RX ADMIN — SODIUM CHLORIDE 250 ML: 9 INJECTION, SOLUTION INTRAVENOUS at 09:57

## 2023-09-07 RX ADMIN — ATEZOLIZUMAB 1200 MG: 1200 INJECTION, SOLUTION INTRAVENOUS at 09:57

## 2023-09-19 ENCOUNTER — TELEPHONE (OUTPATIENT)
Dept: CARDIAC SURGERY | Facility: CLINIC | Age: 63
End: 2023-09-19
Payer: MEDICARE

## 2023-09-21 DIAGNOSIS — C22.0 HEPATOCELLULAR CARCINOMA: Primary | ICD-10-CM

## 2023-09-21 RX ORDER — SODIUM CHLORIDE 9 MG/ML
250 INJECTION, SOLUTION INTRAVENOUS ONCE
OUTPATIENT
Start: 2023-11-30

## 2023-09-21 RX ORDER — SODIUM CHLORIDE 9 MG/ML
250 INJECTION, SOLUTION INTRAVENOUS ONCE
OUTPATIENT
Start: 2023-11-09

## 2023-09-21 RX ORDER — SODIUM CHLORIDE 9 MG/ML
250 INJECTION, SOLUTION INTRAVENOUS ONCE
OUTPATIENT
Start: 2023-10-19

## 2023-09-21 RX ORDER — SODIUM CHLORIDE 9 MG/ML
250 INJECTION, SOLUTION INTRAVENOUS ONCE
OUTPATIENT
Start: 2023-09-28

## 2023-09-28 ENCOUNTER — LAB (OUTPATIENT)
Dept: ONCOLOGY | Facility: HOSPITAL | Age: 63
End: 2023-09-28
Payer: MEDICARE

## 2023-09-28 ENCOUNTER — INFUSION (OUTPATIENT)
Dept: ONCOLOGY | Facility: HOSPITAL | Age: 63
End: 2023-09-28
Payer: MEDICARE

## 2023-09-28 VITALS
BODY MASS INDEX: 23.03 KG/M2 | HEART RATE: 65 BPM | SYSTOLIC BLOOD PRESSURE: 117 MMHG | WEIGHT: 138.4 LBS | TEMPERATURE: 97.3 F | RESPIRATION RATE: 18 BRPM | OXYGEN SATURATION: 99 % | DIASTOLIC BLOOD PRESSURE: 62 MMHG

## 2023-09-28 DIAGNOSIS — C22.0 HEPATOCELLULAR CARCINOMA: Primary | ICD-10-CM

## 2023-09-28 DIAGNOSIS — C22.0 HEPATOCELLULAR CARCINOMA: ICD-10-CM

## 2023-09-28 DIAGNOSIS — Z79.899 LONG-TERM USE OF HIGH-RISK MEDICATION: ICD-10-CM

## 2023-09-28 LAB
ALBUMIN SERPL-MCNC: 3.7 G/DL (ref 3.5–5.2)
ALBUMIN/GLOB SERPL: 1 G/DL
ALP SERPL-CCNC: 76 U/L (ref 39–117)
ALPHA-FETOPROTEIN: 5.55 NG/ML (ref 0–8.3)
ALT SERPL W P-5'-P-CCNC: 31 U/L (ref 1–33)
ANION GAP SERPL CALCULATED.3IONS-SCNC: 9.3 MMOL/L (ref 5–15)
AST SERPL-CCNC: 38 U/L (ref 1–32)
BASOPHILS # BLD AUTO: 0.06 10*3/MM3 (ref 0–0.2)
BASOPHILS NFR BLD AUTO: 1.2 % (ref 0–1.5)
BILIRUB SERPL-MCNC: 0.4 MG/DL (ref 0–1.2)
BILIRUB UR QL STRIP: NEGATIVE
BUN SERPL-MCNC: 7 MG/DL (ref 8–23)
BUN/CREAT SERPL: 12.5 (ref 7–25)
CALCIUM SPEC-SCNC: 9.7 MG/DL (ref 8.6–10.5)
CHLORIDE SERPL-SCNC: 111 MMOL/L (ref 98–107)
CLARITY UR: CLEAR
CO2 SERPL-SCNC: 24.7 MMOL/L (ref 22–29)
COLOR UR: YELLOW
CREAT SERPL-MCNC: 0.56 MG/DL (ref 0.57–1)
DEPRECATED RDW RBC AUTO: 52.3 FL (ref 37–54)
EGFRCR SERPLBLD CKD-EPI 2021: 101.4 ML/MIN/1.73
EOSINOPHIL # BLD AUTO: 0.29 10*3/MM3 (ref 0–0.4)
EOSINOPHIL NFR BLD AUTO: 6 % (ref 0.3–6.2)
ERYTHROCYTE [DISTWIDTH] IN BLOOD BY AUTOMATED COUNT: 13.7 % (ref 12.3–15.4)
GLOBULIN UR ELPH-MCNC: 3.7 GM/DL
GLUCOSE SERPL-MCNC: 93 MG/DL (ref 65–99)
GLUCOSE UR STRIP-MCNC: NEGATIVE MG/DL
HCT VFR BLD AUTO: 43.3 % (ref 34–46.6)
HGB BLD-MCNC: 14.2 G/DL (ref 12–15.9)
HGB UR QL STRIP.AUTO: NEGATIVE
IMM GRANULOCYTES # BLD AUTO: 0.01 10*3/MM3 (ref 0–0.05)
IMM GRANULOCYTES NFR BLD AUTO: 0.2 % (ref 0–0.5)
KETONES UR QL STRIP: NEGATIVE
LEUKOCYTE ESTERASE UR QL STRIP.AUTO: NEGATIVE
LYMPHOCYTES # BLD AUTO: 1.14 10*3/MM3 (ref 0.7–3.1)
LYMPHOCYTES NFR BLD AUTO: 23.7 % (ref 19.6–45.3)
MCH RBC QN AUTO: 33.6 PG (ref 26.6–33)
MCHC RBC AUTO-ENTMCNC: 32.8 G/DL (ref 31.5–35.7)
MCV RBC AUTO: 102.4 FL (ref 79–97)
MONOCYTES # BLD AUTO: 0.64 10*3/MM3 (ref 0.1–0.9)
MONOCYTES NFR BLD AUTO: 13.3 % (ref 5–12)
NEUTROPHILS NFR BLD AUTO: 2.68 10*3/MM3 (ref 1.7–7)
NEUTROPHILS NFR BLD AUTO: 55.6 % (ref 42.7–76)
NITRITE UR QL STRIP: NEGATIVE
NRBC BLD AUTO-RTO: 0 /100 WBC (ref 0–0.2)
PH UR STRIP.AUTO: 8.5 [PH] (ref 5–8)
PLATELET # BLD AUTO: 123 10*3/MM3 (ref 140–450)
PMV BLD AUTO: 9.9 FL (ref 6–12)
POTASSIUM SERPL-SCNC: 3.7 MMOL/L (ref 3.5–5.2)
PROT SERPL-MCNC: 7.4 G/DL (ref 6–8.5)
PROT UR QL STRIP: NEGATIVE
RBC # BLD AUTO: 4.23 10*6/MM3 (ref 3.77–5.28)
SODIUM SERPL-SCNC: 145 MMOL/L (ref 136–145)
SP GR UR STRIP: 1.01 (ref 1–1.03)
T4 FREE SERPL-MCNC: 0.94 NG/DL (ref 0.93–1.7)
TSH SERPL DL<=0.05 MIU/L-ACNC: 4.88 UIU/ML (ref 0.27–4.2)
UROBILINOGEN UR QL STRIP: ABNORMAL
WBC NRBC COR # BLD: 4.82 10*3/MM3 (ref 3.4–10.8)

## 2023-09-28 PROCEDURE — 85025 COMPLETE CBC W/AUTO DIFF WBC: CPT

## 2023-09-28 PROCEDURE — 84443 ASSAY THYROID STIM HORMONE: CPT

## 2023-09-28 PROCEDURE — 84439 ASSAY OF FREE THYROXINE: CPT

## 2023-09-28 PROCEDURE — 80053 COMPREHEN METABOLIC PANEL: CPT

## 2023-09-28 PROCEDURE — 81003 URINALYSIS AUTO W/O SCOPE: CPT

## 2023-09-28 PROCEDURE — 82105 ALPHA-FETOPROTEIN SERUM: CPT

## 2023-09-28 PROCEDURE — 25010000002 ATEZOLIZUMAB 1200 MG/20ML SOLUTION 20 ML VIAL: Performed by: INTERNAL MEDICINE

## 2023-09-28 PROCEDURE — 96413 CHEMO IV INFUSION 1 HR: CPT

## 2023-09-28 RX ORDER — SODIUM CHLORIDE 9 MG/ML
250 INJECTION, SOLUTION INTRAVENOUS ONCE
Status: COMPLETED | OUTPATIENT
Start: 2023-09-28 | End: 2023-09-28

## 2023-09-28 RX ADMIN — ATEZOLIZUMAB 1200 MG: 1200 INJECTION, SOLUTION INTRAVENOUS at 09:57

## 2023-09-28 RX ADMIN — SODIUM CHLORIDE 250 ML: 9 INJECTION, SOLUTION INTRAVENOUS at 09:57

## 2023-10-18 ENCOUNTER — OFFICE VISIT (OUTPATIENT)
Dept: CARDIAC SURGERY | Facility: CLINIC | Age: 63
End: 2023-10-18
Payer: MEDICARE

## 2023-10-18 VITALS
HEART RATE: 66 BPM | OXYGEN SATURATION: 95 % | HEIGHT: 65 IN | WEIGHT: 140 LBS | TEMPERATURE: 97.5 F | DIASTOLIC BLOOD PRESSURE: 79 MMHG | BODY MASS INDEX: 23.32 KG/M2 | SYSTOLIC BLOOD PRESSURE: 132 MMHG

## 2023-10-18 DIAGNOSIS — R06.02 SHORTNESS OF BREATH: ICD-10-CM

## 2023-10-18 DIAGNOSIS — I71.21 ANEURYSM OF ASCENDING AORTA WITHOUT RUPTURE: Primary | ICD-10-CM

## 2023-10-18 PROCEDURE — 99203 OFFICE O/P NEW LOW 30 MIN: CPT | Performed by: THORACIC SURGERY (CARDIOTHORACIC VASCULAR SURGERY)

## 2023-10-18 PROCEDURE — 1160F RVW MEDS BY RX/DR IN RCRD: CPT | Performed by: THORACIC SURGERY (CARDIOTHORACIC VASCULAR SURGERY)

## 2023-10-18 PROCEDURE — 1159F MED LIST DOCD IN RCRD: CPT | Performed by: THORACIC SURGERY (CARDIOTHORACIC VASCULAR SURGERY)

## 2023-10-18 RX ORDER — DIPHENOXYLATE HYDROCHLORIDE AND ATROPINE SULFATE 2.5; .025 MG/1; MG/1
1 TABLET ORAL DAILY
COMMUNITY

## 2023-10-18 NOTE — PROGRESS NOTES
10/18/2023  Patient Information  Noemy Brooks                                                                                          [address]   [facility] [place] [address]   1960  'PCP/Referring Physician'  Haley Bailey, DO  [phone]  Lynn Haley Chanelle,*  [phone]  Chief Complaint   Patient presents with    Thoracic Aneurysm     Np referred for an ascending aortic aneurysm,complains of fatigue.       History of Present Illness: 63-year-old  female with a history of hypertension, hyperlipidemia, COPD, previous tobacco abuse, hepatocellular carcinoma, cirrhosis and cervical cancer who presents with an incidental ascending aortic aneurysm.  The patient denies chest pain or known family history of aneurysms.  The patient is adopted and knows some of her family history.  She does note mid thoracic back pain that has been present for the past year and a half along her bra line.      Patient Active Problem List   Diagnosis    Hepatocellular carcinoma    Esophageal dysphagia    Epigastric pain     Past Medical History:   Diagnosis Date    Acid reflux     Aortic aneurysm     Cancer     LIVER, UTERINE    Cirrhosis     COPD (chronic obstructive pulmonary disease)     Depression     Disease of thyroid gland     Elevated cholesterol     High cholesterol     History of transfusion     Hypertension     Infectious viral hepatitis     2019    Osteoporosis     Ovarian cancer      Past Surgical History:   Procedure Laterality Date    APPENDECTOMY N/A     COLONOSCOPY      ENDOSCOPY N/A 8/8/2023    Procedure: ESOPHAGOGASTRODUODENOSCOPY WITH BIOPSY;  Surgeon: Rahel Sesay MD;  Location: [facility];  Service: Gastroenterology;  Laterality: N/A;    FRACTURE SURGERY Left     ARM, HAS 2 SCREWS    HYSTERECTOMY      UPPER GASTROINTESTINAL ENDOSCOPY         Current Outpatient Medications:     albuterol sulfate  (90 Base) MCG/ACT inhaler, Inhale 2 puffs Every 4 (Four) Hours  As Needed for Wheezing., Disp: , Rfl:     Atezolizumab (TECENTRIQ IV), Infuse  into a venous catheter Every 21 (Twenty-One) Days., Disp: , Rfl:     B Complex Vitamins (VITAMIN B COMPLEX PO), Take  by mouth., Disp: , Rfl:     budesonide-formoterol (SYMBICORT) 160-4.5 MCG/ACT inhaler, Inhale 2 puffs 2 (Two) Times a Day., Disp: , Rfl:     cholecalciferol (VITAMIN D3) 25 MCG (1000 UT) tablet, Take 1 tablet by mouth Daily., Disp: , Rfl:     diphenhydrAMINE 12.5 MG/5ML elixir 20 mL, aluminum-magnesium hydroxide-simethicone 400-400-40 MG/5ML suspension 20 mL, Lidocaine Viscous HCl 2 % solution 20 mL, Swish and spit 15 mL Every 4 (Four) Hours As Needed., Disp: , Rfl:     levothyroxine (SYNTHROID, LEVOTHROID) 25 MCG tablet, Take 1 tablet by mouth Every Morning., Disp: 30 tablet, Rfl: 1    lisinopril (PRINIVIL,ZESTRIL) 30 MG tablet, Take 1 tablet by mouth Daily., Disp: , Rfl:     Magnesium Hydroxide (MILK OF MAGNESIA PO), Take  by mouth., Disp: , Rfl:     multivitamin (MULTI VITAMIN DAILY PO), Take 1 tablet by mouth Daily., Disp: , Rfl:     omeprazole (priLOSEC) 20 MG capsule, Take 1 capsule by mouth Daily., Disp: , Rfl:     traZODone (DESYREL) 100 MG tablet, Take 1 tablet by mouth Every Night., Disp: , Rfl:     cyclobenzaprine (FLEXERIL) 5 MG tablet, Take 1 tablet by mouth 3 (Three) Times a Day As Needed for Muscle Spasms. (Patient not taking: Reported on 10/18/2023), Disp: , Rfl:     dronabinol (Marinol) 2.5 MG capsule, Take 1 capsule by mouth 2 (Two) Times a Day Before Meals. (Patient not taking: Reported on 10/18/2023), Disp: 60 capsule, Rfl: 2    lisinopril-hydrochlorothiazide (PRINZIDE,ZESTORETIC) 20-12.5 MG per tablet, Take 1 tablet by mouth Daily. (Patient not taking: Reported on 10/18/2023), Disp: , Rfl:   Allergies   Allergen Reactions    Codeine GI Intolerance     Social History     Socioeconomic History    Marital status:     Number of children: 3   Tobacco Use    Smoking status: Former     Packs/day: 1.50      Years: 25.00     Additional pack years: 0.00     Total pack years: 37.50     Types: Cigarettes     Quit date: 2015     Years since quittin.4    Smokeless tobacco: Never    Tobacco comments:     smoked for approx 20 years, 1.5 to 2 ppd   Vaping Use    Vaping Use: Never used   Substance and Sexual Activity    Alcohol use: No     Comment: she previously drank daily for 16 years, beer    Drug use: No    Sexual activity: Defer     Family History   Problem Relation Age of Onset    Stroke Mother     Hypertension Mother     COPD Mother     Throat cancer Mother     Arthritis Mother     Asthma Mother     Heart attack Mother     Stroke Father     Cancer Father     Liver cancer Father      Review of Systems   Constitutional: Positive for malaise/fatigue. Negative for chills, fever, night sweats and weight loss.   HENT:  Positive for nosebleeds. Negative for hearing loss, odynophagia and sore throat.    Cardiovascular:  Positive for dyspnea on exertion. Negative for chest pain, leg swelling, orthopnea and palpitations.   Respiratory:  Positive for cough, shortness of breath and sputum production. Negative for hemoptysis.    Endocrine: Negative for cold intolerance, heat intolerance, polydipsia, polyphagia and polyuria.   Hematologic/Lymphatic: Bruises/bleeds easily.   Skin: Negative.  Negative for itching and rash.   Musculoskeletal:  Positive for back pain and muscle cramps. Negative for joint pain, joint swelling and myalgias.   Gastrointestinal:  Positive for abdominal pain and constipation. Negative for diarrhea, hematemesis, hematochezia, melena, nausea and vomiting.   Genitourinary: Negative.  Negative for dysuria, frequency and hematuria.   Neurological:  Positive for headaches and loss of balance. Negative for focal weakness, numbness and seizures.   Psychiatric/Behavioral:  Positive for depression. Negative for suicidal ideas.    All other systems reviewed and are negative.    Vitals:    10/18/23 1208   BP:  "132/79   BP Location: Left arm   Patient Position: Sitting   Pulse: 66   Temp: 97.5 °F (36.4 °C)   SpO2: 95%   Weight: 63.5 kg (140 lb)   Height: 165.1 cm (65\")      Physical Exam  Vitals reviewed.   Constitutional:       General: She is not in acute distress.     Appearance: She is well-developed. She is not diaphoretic.      Comments:  female who appears stated age   HENT:      Head: Normocephalic and atraumatic.   Eyes:      General: No scleral icterus.     Conjunctiva/sclera: Conjunctivae normal.   Neck:      Vascular: No JVD.      Trachea: No tracheal deviation.   Cardiovascular:      Rate and Rhythm: Normal rate and regular rhythm.      Heart sounds: Normal heart sounds. No murmur heard.     No friction rub. No gallop.   Pulmonary:      Effort: Pulmonary effort is normal. No respiratory distress.      Breath sounds: Normal breath sounds. No wheezing or rales.   Abdominal:      General: There is no distension.      Palpations: Abdomen is soft. There is no mass.      Tenderness: There is no abdominal tenderness. There is no guarding or rebound.   Musculoskeletal:         General: Normal range of motion.      Cervical back: Neck supple.   Skin:     General: Skin is warm and dry.      Findings: No erythema or rash.   Neurological:      Mental Status: She is alert and oriented to person, place, and time.   Psychiatric:         Behavior: Behavior normal.         Thought Content: Thought content normal.         Judgment: Judgment normal.         The ROS, past medical history, surgical history, family history, social history, and vitals were reviewed by myself and corrected as needed.      Labs/Imaging:  -CT of the chest lung cancer screening protocol performed 4/10/2023, personally reviewed, demonstrates a stable 5 mm left upper lobe lung nodule.  The ascending aorta measures 4.2 cm    Assessment/Plan:  63-year-old  female with a history of hypertension, hyperlipidemia, COPD, previous tobacco abuse, " hepatocellular carcinoma, cirrhosis and cervical cancer who presents with an incidental 4.2 cm ascending aortic aneurysm.  I discussed with the patient the importance of strict blood pressure control and avoidance of heavy lifting in the setting of an ascending aortic aneurysm.  Her blood pressure was slightly elevated in the office today and she may warrant adjustment of her oral antihypertensives.  The patient does have some shortness of breath with activity and I recommended an echocardiogram to rule out aortic insufficiency.  A repeat CT scan of the chest will be obtained in 1 year for continued surveillance of her ascending aortic aneurysm.  Assuming her echocardiogram is normal, I will have the patient return to clinic in 1 year to discuss the results of her repeat CT scan of the chest.    Patient Active Problem List   Diagnosis    Hepatocellular carcinoma    Esophageal dysphagia    Epigastric pain

## 2023-10-19 ENCOUNTER — TELEPHONE (OUTPATIENT)
Dept: CARDIAC SURGERY | Facility: CLINIC | Age: 63
End: 2023-10-19
Payer: MEDICARE

## 2023-10-19 ENCOUNTER — LAB (OUTPATIENT)
Dept: ONCOLOGY | Facility: HOSPITAL | Age: 63
End: 2023-10-19
Payer: MEDICARE

## 2023-10-19 ENCOUNTER — INFUSION (OUTPATIENT)
Dept: ONCOLOGY | Facility: HOSPITAL | Age: 63
End: 2023-10-19
Payer: MEDICARE

## 2023-10-19 VITALS
OXYGEN SATURATION: 92 % | TEMPERATURE: 97.8 F | RESPIRATION RATE: 18 BRPM | HEART RATE: 76 BPM | WEIGHT: 138.5 LBS | BODY MASS INDEX: 23.05 KG/M2 | SYSTOLIC BLOOD PRESSURE: 147 MMHG | DIASTOLIC BLOOD PRESSURE: 79 MMHG

## 2023-10-19 DIAGNOSIS — C22.0 HEPATOCELLULAR CARCINOMA: ICD-10-CM

## 2023-10-19 DIAGNOSIS — C22.0 HEPATOCELLULAR CARCINOMA: Primary | ICD-10-CM

## 2023-10-19 LAB
ALBUMIN SERPL-MCNC: 3.9 G/DL (ref 3.5–5.2)
ALBUMIN/GLOB SERPL: 1.1 G/DL
ALP SERPL-CCNC: 83 U/L (ref 39–117)
ALT SERPL W P-5'-P-CCNC: 35 U/L (ref 1–33)
ANION GAP SERPL CALCULATED.3IONS-SCNC: 8.5 MMOL/L (ref 5–15)
AST SERPL-CCNC: 42 U/L (ref 1–32)
BASOPHILS # BLD AUTO: 0.08 10*3/MM3 (ref 0–0.2)
BASOPHILS NFR BLD AUTO: 1.3 % (ref 0–1.5)
BILIRUB SERPL-MCNC: 0.5 MG/DL (ref 0–1.2)
BILIRUB UR QL STRIP: NEGATIVE
BUN SERPL-MCNC: 9 MG/DL (ref 8–23)
BUN/CREAT SERPL: 14.5 (ref 7–25)
CALCIUM SPEC-SCNC: 9.6 MG/DL (ref 8.6–10.5)
CHLORIDE SERPL-SCNC: 110 MMOL/L (ref 98–107)
CLARITY UR: CLEAR
CO2 SERPL-SCNC: 24.5 MMOL/L (ref 22–29)
COLOR UR: YELLOW
CREAT SERPL-MCNC: 0.62 MG/DL (ref 0.57–1)
DEPRECATED RDW RBC AUTO: 51.6 FL (ref 37–54)
EGFRCR SERPLBLD CKD-EPI 2021: 99 ML/MIN/1.73
EOSINOPHIL # BLD AUTO: 0.43 10*3/MM3 (ref 0–0.4)
EOSINOPHIL NFR BLD AUTO: 7.1 % (ref 0.3–6.2)
ERYTHROCYTE [DISTWIDTH] IN BLOOD BY AUTOMATED COUNT: 13.7 % (ref 12.3–15.4)
GLOBULIN UR ELPH-MCNC: 3.4 GM/DL
GLUCOSE SERPL-MCNC: 89 MG/DL (ref 65–99)
GLUCOSE UR STRIP-MCNC: NEGATIVE MG/DL
HCT VFR BLD AUTO: 43.7 % (ref 34–46.6)
HGB BLD-MCNC: 14.4 G/DL (ref 12–15.9)
HGB UR QL STRIP.AUTO: NEGATIVE
IMM GRANULOCYTES # BLD AUTO: 0.01 10*3/MM3 (ref 0–0.05)
IMM GRANULOCYTES NFR BLD AUTO: 0.2 % (ref 0–0.5)
KETONES UR QL STRIP: NEGATIVE
LEUKOCYTE ESTERASE UR QL STRIP.AUTO: NEGATIVE
LYMPHOCYTES # BLD AUTO: 1.38 10*3/MM3 (ref 0.7–3.1)
LYMPHOCYTES NFR BLD AUTO: 22.8 % (ref 19.6–45.3)
MCH RBC QN AUTO: 33.7 PG (ref 26.6–33)
MCHC RBC AUTO-ENTMCNC: 33 G/DL (ref 31.5–35.7)
MCV RBC AUTO: 102.3 FL (ref 79–97)
MONOCYTES # BLD AUTO: 0.73 10*3/MM3 (ref 0.1–0.9)
MONOCYTES NFR BLD AUTO: 12.1 % (ref 5–12)
NEUTROPHILS NFR BLD AUTO: 3.42 10*3/MM3 (ref 1.7–7)
NEUTROPHILS NFR BLD AUTO: 56.5 % (ref 42.7–76)
NITRITE UR QL STRIP: NEGATIVE
NRBC BLD AUTO-RTO: 0 /100 WBC (ref 0–0.2)
PH UR STRIP.AUTO: 8 [PH] (ref 5–8)
PLATELET # BLD AUTO: 119 10*3/MM3 (ref 140–450)
PMV BLD AUTO: 9.9 FL (ref 6–12)
POTASSIUM SERPL-SCNC: 4.2 MMOL/L (ref 3.5–5.2)
PROT SERPL-MCNC: 7.3 G/DL (ref 6–8.5)
PROT UR QL STRIP: NEGATIVE
RBC # BLD AUTO: 4.27 10*6/MM3 (ref 3.77–5.28)
SODIUM SERPL-SCNC: 143 MMOL/L (ref 136–145)
SP GR UR STRIP: <=1.005 (ref 1–1.03)
TSH SERPL DL<=0.05 MIU/L-ACNC: 5.34 UIU/ML (ref 0.27–4.2)
UROBILINOGEN UR QL STRIP: NORMAL
WBC NRBC COR # BLD: 6.05 10*3/MM3 (ref 3.4–10.8)

## 2023-10-19 PROCEDURE — 84443 ASSAY THYROID STIM HORMONE: CPT

## 2023-10-19 PROCEDURE — 85025 COMPLETE CBC W/AUTO DIFF WBC: CPT

## 2023-10-19 PROCEDURE — 80053 COMPREHEN METABOLIC PANEL: CPT

## 2023-10-19 PROCEDURE — 25810000003 SODIUM CHLORIDE 0.9 % SOLUTION 250 ML FLEX CONT: Performed by: INTERNAL MEDICINE

## 2023-10-19 PROCEDURE — 96413 CHEMO IV INFUSION 1 HR: CPT

## 2023-10-19 PROCEDURE — 81003 URINALYSIS AUTO W/O SCOPE: CPT

## 2023-10-19 PROCEDURE — 25010000002 ATEZOLIZUMAB 1200 MG/20ML SOLUTION 20 ML VIAL: Performed by: INTERNAL MEDICINE

## 2023-10-19 PROCEDURE — 25810000003 SODIUM CHLORIDE 0.9 % SOLUTION: Performed by: INTERNAL MEDICINE

## 2023-10-19 RX ORDER — SODIUM CHLORIDE 9 MG/ML
250 INJECTION, SOLUTION INTRAVENOUS ONCE
Status: COMPLETED | OUTPATIENT
Start: 2023-10-19 | End: 2023-10-19

## 2023-10-19 RX ADMIN — ATEZOLIZUMAB 1200 MG: 1200 INJECTION, SOLUTION INTRAVENOUS at 10:10

## 2023-10-19 RX ADMIN — SODIUM CHLORIDE 250 ML: 9 INJECTION, SOLUTION INTRAVENOUS at 10:10

## 2023-10-19 NOTE — TELEPHONE ENCOUNTER
Caller: Noemy Brooks    Relationship: Self    Best call back number:     [phone]       What form or medical record are you requesting: PAPERWORK/ LETTER    Who is requesting this form or medical record from you: PATIENT LANDLORD    How would you like to receive the form or medical records (pick-up, mail, fax): MAIL   If mail, what is the address: [address]  [facility] [address]   If pick-up, provide patient with address and location details    Timeframe paperwork needed: ASAP    Additional notes: PATIENT STATED THAT SHE IS HAVING PROBLEMS WITH LIFTING THINGS OVER 10LBS. PATIENT WOULD LIKE A LETTER TO GIVE HER LANDLORD EXPLAINING THAT SHE CANNOT LIFT OVER 10LBS. PATIENT STATED THAT IT IS HARD TO CARRY HER GROCERIES INSIDE. WITH THIS LETTER IT WOULD DOCUMENT THE PATIENT RESTRICTIONS.

## 2023-10-20 NOTE — TELEPHONE ENCOUNTER
I have spoke with Dr. Green- lifting restrictions of 25 lbs.   I spoke with patient- she will not need letter now that lifting will be greater than 10lbs.

## 2023-11-03 ENCOUNTER — HOSPITAL ENCOUNTER (OUTPATIENT)
Dept: MRI IMAGING | Facility: HOSPITAL | Age: 63
Discharge: HOME | End: 2023-11-03
Admitting: INTERNAL MEDICINE
Payer: MEDICARE

## 2023-11-03 DIAGNOSIS — Z79.899 LONG-TERM USE OF HIGH-RISK MEDICATION: ICD-10-CM

## 2023-11-03 DIAGNOSIS — C22.0 HEPATOCELLULAR CARCINOMA: ICD-10-CM

## 2023-11-03 PROCEDURE — A9577 INJ MULTIHANCE: HCPCS | Performed by: INTERNAL MEDICINE

## 2023-11-03 PROCEDURE — 0 GADOBENATE DIMEGLUMINE 529 MG/ML SOLUTION: Performed by: INTERNAL MEDICINE

## 2023-11-03 PROCEDURE — 74183 MRI ABD W/O CNTR FLWD CNTR: CPT

## 2023-11-03 RX ADMIN — GADOBENATE DIMEGLUMINE 12 ML: 529 INJECTION, SOLUTION INTRAVENOUS at 14:09

## 2023-11-06 ENCOUNTER — HOSPITAL ENCOUNTER (OUTPATIENT)
Dept: CARDIOLOGY | Facility: HOSPITAL | Age: 63
Discharge: HOME | End: 2023-11-06
Admitting: THORACIC SURGERY (CARDIOTHORACIC VASCULAR SURGERY)
Payer: MEDICARE

## 2023-11-06 DIAGNOSIS — I71.21 ANEURYSM OF ASCENDING AORTA WITHOUT RUPTURE: ICD-10-CM

## 2023-11-06 PROCEDURE — 93306 TTE W/DOPPLER COMPLETE: CPT

## 2023-11-08 LAB
BH CV ECHO MEAS - ACS: 1.8 CM
BH CV ECHO MEAS - AO MAX PG: 6.9 MMHG
BH CV ECHO MEAS - AO MEAN PG: 5 MMHG
BH CV ECHO MEAS - AO ROOT DIAM: 3.2 CM
BH CV ECHO MEAS - AO V2 MAX: 131 CM/SEC
BH CV ECHO MEAS - AO V2 VTI: 33.4 CM
BH CV ECHO MEAS - EDV(CUBED): 103.8 ML
BH CV ECHO MEAS - EDV(MOD-SP4): 65 ML
BH CV ECHO MEAS - EF(MOD-SP4): 73.5 %
BH CV ECHO MEAS - ESV(CUBED): 10.6 ML
BH CV ECHO MEAS - ESV(MOD-SP4): 17.2 ML
BH CV ECHO MEAS - FS: 53.2 %
BH CV ECHO MEAS - IVS/LVPW: 1 CM
BH CV ECHO MEAS - IVSD: 0.9 CM
BH CV ECHO MEAS - LA DIMENSION: 3.9 CM
BH CV ECHO MEAS - LAT PEAK E' VEL: 9.9 CM/SEC
BH CV ECHO MEAS - LV DIASTOLIC VOL/BSA (35-75): 38.5 CM2
BH CV ECHO MEAS - LV MASS(C)D: 142.7 GRAMS
BH CV ECHO MEAS - LV SYSTOLIC VOL/BSA (12-30): 10.2 CM2
BH CV ECHO MEAS - LVIDD: 4.7 CM
BH CV ECHO MEAS - LVIDS: 2.2 CM
BH CV ECHO MEAS - LVOT AREA: 2.8 CM2
BH CV ECHO MEAS - LVOT DIAM: 1.9 CM
BH CV ECHO MEAS - LVPWD: 0.9 CM
BH CV ECHO MEAS - MED PEAK E' VEL: 8.8 CM/SEC
BH CV ECHO MEAS - MV A MAX VEL: 68.3 CM/SEC
BH CV ECHO MEAS - MV DEC SLOPE: 390 CM/SEC2
BH CV ECHO MEAS - MV DEC TIME: 0.23 SEC
BH CV ECHO MEAS - MV E MAX VEL: 90 CM/SEC
BH CV ECHO MEAS - MV E/A: 1.32
BH CV ECHO MEAS - PA ACC TIME: 0.12 SEC
BH CV ECHO MEAS - RAP SYSTOLE: 10 MMHG
BH CV ECHO MEAS - RVSP: 32.7 MMHG
BH CV ECHO MEAS - SI(MOD-SP4): 28.3 ML/M2
BH CV ECHO MEAS - SV(MOD-SP4): 47.8 ML
BH CV ECHO MEAS - TAPSE (>1.6): 3.1 CM
BH CV ECHO MEAS - TR MAX PG: 22.7 MMHG
BH CV ECHO MEAS - TR MAX VEL: 238 CM/SEC
BH CV ECHO MEASUREMENTS AVERAGE E/E' RATIO: 9.63

## 2023-11-09 ENCOUNTER — OFFICE VISIT (OUTPATIENT)
Dept: ONCOLOGY | Facility: CLINIC | Age: 63
End: 2023-11-09
Payer: MEDICARE

## 2023-11-09 ENCOUNTER — LAB (OUTPATIENT)
Dept: ONCOLOGY | Facility: HOSPITAL | Age: 63
End: 2023-11-09
Payer: MEDICARE

## 2023-11-09 ENCOUNTER — INFUSION (OUTPATIENT)
Dept: ONCOLOGY | Facility: HOSPITAL | Age: 63
End: 2023-11-09
Payer: MEDICARE

## 2023-11-09 VITALS
DIASTOLIC BLOOD PRESSURE: 69 MMHG | RESPIRATION RATE: 18 BRPM | WEIGHT: 138.8 LBS | OXYGEN SATURATION: 91 % | SYSTOLIC BLOOD PRESSURE: 120 MMHG | BODY MASS INDEX: 23.13 KG/M2 | HEIGHT: 65 IN | TEMPERATURE: 97.8 F | HEART RATE: 73 BPM

## 2023-11-09 DIAGNOSIS — Z79.899 LONG-TERM USE OF HIGH-RISK MEDICATION: ICD-10-CM

## 2023-11-09 DIAGNOSIS — C22.0 HEPATOCELLULAR CARCINOMA: Primary | ICD-10-CM

## 2023-11-09 DIAGNOSIS — C22.0 HEPATOCELLULAR CARCINOMA: ICD-10-CM

## 2023-11-09 LAB
ALBUMIN SERPL-MCNC: 3.7 G/DL (ref 3.5–5.2)
ALBUMIN/GLOB SERPL: 1.1 G/DL
ALP SERPL-CCNC: 82 U/L (ref 39–117)
ALPHA-FETOPROTEIN: 6 NG/ML (ref 0–8.3)
ALT SERPL W P-5'-P-CCNC: 29 U/L (ref 1–33)
ANION GAP SERPL CALCULATED.3IONS-SCNC: 11.2 MMOL/L (ref 5–15)
AST SERPL-CCNC: 37 U/L (ref 1–32)
BASOPHILS # BLD AUTO: 0.07 10*3/MM3 (ref 0–0.2)
BASOPHILS NFR BLD AUTO: 1.2 % (ref 0–1.5)
BILIRUB SERPL-MCNC: 0.6 MG/DL (ref 0–1.2)
BILIRUB UR QL STRIP: NEGATIVE
BUN SERPL-MCNC: 9 MG/DL (ref 8–23)
BUN/CREAT SERPL: 13.8 (ref 7–25)
CALCIUM SPEC-SCNC: 9.3 MG/DL (ref 8.6–10.5)
CHLORIDE SERPL-SCNC: 111 MMOL/L (ref 98–107)
CLARITY UR: CLEAR
CO2 SERPL-SCNC: 20.8 MMOL/L (ref 22–29)
COLOR UR: YELLOW
CREAT SERPL-MCNC: 0.65 MG/DL (ref 0.57–1)
DEPRECATED RDW RBC AUTO: 50.8 FL (ref 37–54)
EGFRCR SERPLBLD CKD-EPI 2021: 97.8 ML/MIN/1.73
EOSINOPHIL # BLD AUTO: 0.43 10*3/MM3 (ref 0–0.4)
EOSINOPHIL NFR BLD AUTO: 7.3 % (ref 0.3–6.2)
ERYTHROCYTE [DISTWIDTH] IN BLOOD BY AUTOMATED COUNT: 13.5 % (ref 12.3–15.4)
GLOBULIN UR ELPH-MCNC: 3.3 GM/DL
GLUCOSE SERPL-MCNC: 86 MG/DL (ref 65–99)
GLUCOSE UR STRIP-MCNC: NEGATIVE MG/DL
HCT VFR BLD AUTO: 42.3 % (ref 34–46.6)
HGB BLD-MCNC: 14 G/DL (ref 12–15.9)
HGB UR QL STRIP.AUTO: NEGATIVE
IMM GRANULOCYTES # BLD AUTO: 0 10*3/MM3 (ref 0–0.05)
IMM GRANULOCYTES NFR BLD AUTO: 0 % (ref 0–0.5)
KETONES UR QL STRIP: NEGATIVE
LEUKOCYTE ESTERASE UR QL STRIP.AUTO: NEGATIVE
LYMPHOCYTES # BLD AUTO: 1.47 10*3/MM3 (ref 0.7–3.1)
LYMPHOCYTES NFR BLD AUTO: 24.9 % (ref 19.6–45.3)
MCH RBC QN AUTO: 33.6 PG (ref 26.6–33)
MCHC RBC AUTO-ENTMCNC: 33.1 G/DL (ref 31.5–35.7)
MCV RBC AUTO: 101.4 FL (ref 79–97)
MONOCYTES # BLD AUTO: 0.77 10*3/MM3 (ref 0.1–0.9)
MONOCYTES NFR BLD AUTO: 13 % (ref 5–12)
NEUTROPHILS NFR BLD AUTO: 3.17 10*3/MM3 (ref 1.7–7)
NEUTROPHILS NFR BLD AUTO: 53.6 % (ref 42.7–76)
NITRITE UR QL STRIP: NEGATIVE
NRBC BLD AUTO-RTO: 0 /100 WBC (ref 0–0.2)
PH UR STRIP.AUTO: 7.5 [PH] (ref 5–8)
PLATELET # BLD AUTO: 126 10*3/MM3 (ref 140–450)
PMV BLD AUTO: 9.8 FL (ref 6–12)
POTASSIUM SERPL-SCNC: 3.8 MMOL/L (ref 3.5–5.2)
PROT SERPL-MCNC: 7 G/DL (ref 6–8.5)
PROT UR QL STRIP: NEGATIVE
RBC # BLD AUTO: 4.17 10*6/MM3 (ref 3.77–5.28)
SODIUM SERPL-SCNC: 143 MMOL/L (ref 136–145)
SP GR UR STRIP: 1.01 (ref 1–1.03)
TSH SERPL DL<=0.05 MIU/L-ACNC: 4.48 UIU/ML (ref 0.27–4.2)
UROBILINOGEN UR QL STRIP: NORMAL
WBC NRBC COR # BLD: 5.91 10*3/MM3 (ref 3.4–10.8)

## 2023-11-09 PROCEDURE — 1126F AMNT PAIN NOTED NONE PRSNT: CPT | Performed by: INTERNAL MEDICINE

## 2023-11-09 PROCEDURE — 25810000003 SODIUM CHLORIDE 0.9 % SOLUTION 250 ML FLEX CONT: Performed by: INTERNAL MEDICINE

## 2023-11-09 PROCEDURE — 80053 COMPREHEN METABOLIC PANEL: CPT

## 2023-11-09 PROCEDURE — 82105 ALPHA-FETOPROTEIN SERUM: CPT

## 2023-11-09 PROCEDURE — 96413 CHEMO IV INFUSION 1 HR: CPT

## 2023-11-09 PROCEDURE — 25810000003 SODIUM CHLORIDE 0.9 % SOLUTION: Performed by: INTERNAL MEDICINE

## 2023-11-09 PROCEDURE — 81003 URINALYSIS AUTO W/O SCOPE: CPT

## 2023-11-09 PROCEDURE — 99214 OFFICE O/P EST MOD 30 MIN: CPT | Performed by: INTERNAL MEDICINE

## 2023-11-09 PROCEDURE — 84443 ASSAY THYROID STIM HORMONE: CPT

## 2023-11-09 PROCEDURE — 85025 COMPLETE CBC W/AUTO DIFF WBC: CPT

## 2023-11-09 PROCEDURE — 25010000002 ATEZOLIZUMAB 1200 MG/20ML SOLUTION 20 ML VIAL: Performed by: INTERNAL MEDICINE

## 2023-11-09 RX ORDER — SODIUM CHLORIDE 9 MG/ML
250 INJECTION, SOLUTION INTRAVENOUS ONCE
Status: COMPLETED | OUTPATIENT
Start: 2023-11-09 | End: 2023-11-09

## 2023-11-09 RX ADMIN — ATEZOLIZUMAB 1200 MG: 1200 INJECTION, SOLUTION INTRAVENOUS at 11:01

## 2023-11-09 RX ADMIN — SODIUM CHLORIDE 250 ML: 9 INJECTION, SOLUTION INTRAVENOUS at 11:01

## 2023-11-09 NOTE — PROGRESS NOTES
Name:  Noemy Brooks  :  1960  Date:  2023     REFERRING PHYSICIAN  Henri Melvin MD    PRIMARY CARE PHYSICIAN  Lynn, Haley Roca DO    REASON FOR FOLLOWUP  1. Hepatocellular carcinoma      CHIEF COMPLAINT  Fatigue.    Dear Dr. Bailey,    HISTORY OF PRESENT ILLNESS:   I saw Ms. Brooks in followup today in our medical oncology clinic. As you are aware, she is a pleasant, 63 y.o., white female with a history of hypertension, hepatitis C (treated with antivirals and reportedly cured in ~), cirrhosis and hepatocellular carcinoma who was initially diagnosed with the latter in ~2022. [place], [place]. Her [place] gastroenterologist referred her to  at that time once she was found to have a couple of liver masses and a serum AFP > 1,000 ng/mL. She was evaluated by the liver transplant service; however, due to the HCC's involvement of the hepatic vessels, she was felt to not be a candidate (for transplant). She was subsequently evaluated by  medical oncology, who recommended starting first-line, palliative treatment with a combination of [id number] bevacizumab (Mvasi) and atezolizumab (Tecentriq), per the current standard of care. She received a total of eight (8) cycles between 2022 and early 2023 through the [facility]. Due to transportation issues (her sister has to be the one to drive her, and the trips to [place] have been getting increasingly difficult for her), she presented to our clinic in 2023 in order to transfer her ongoing, oncologic care to us, closer to her home in [place], [place].    INTERIM HISTORY:  Ms. Brooks returns to clinic today for follow up by herself. She continues to tolerate [id number] atezolizumab overall well, and she has now received a total of seventeen (17) cycles to date. She previously stated that the Lord cured her once before (of Hepatitis C), and she has recently become convinced that he has/will do it again (this time for her HCC).  Her primary, and only, complaint today is of chronic fatigue, which she blames on the ongoing immunotherapy (although she realizes the cirrhosis is not helping either).    Past Medical History:   Diagnosis Date    Acid reflux     Aortic aneurysm     Cancer     LIVER, UTERINE    Cirrhosis     COPD (chronic obstructive pulmonary disease)     Depression     Disease of thyroid gland     Elevated cholesterol     High cholesterol     History of transfusion     Hypertension     Infectious viral hepatitis         Osteoporosis     Ovarian cancer        Past Surgical History:   Procedure Laterality Date    APPENDECTOMY N/A     COLONOSCOPY      ENDOSCOPY N/A 2023    Procedure: ESOPHAGOGASTRODUODENOSCOPY WITH BIOPSY;  Surgeon: Rahel Sesay MD;  Location: [facility];  Service: Gastroenterology;  Laterality: N/A;    FRACTURE SURGERY Left     ARM, HAS 2 SCREWS    HYSTERECTOMY      UPPER GASTROINTESTINAL ENDOSCOPY         Social History     Socioeconomic History    Marital status:     Number of children: 3   Tobacco Use    Smoking status: Former     Packs/day: 1.50     Years: 25.00     Additional pack years: 0.00     Total pack years: 37.50     Types: Cigarettes     Quit date: 2015     Years since quittin.5    Smokeless tobacco: Never    Tobacco comments:     smoked for approx 20 years, 1.5 to 2 ppd   Vaping Use    Vaping Use: Never used   Substance and Sexual Activity    Alcohol use: No     Comment: she previously drank daily for 16 years, beer    Drug use: No    Sexual activity: Defer       Family History   Problem Relation Age of Onset    Stroke Mother     Hypertension Mother     COPD Mother     Throat cancer Mother     Arthritis Mother     Asthma Mother     Heart attack Mother     Stroke Father     Cancer Father     Liver cancer Father        Allergies   Allergen Reactions    Codeine GI Intolerance       Current Outpatient Medications   Medication Sig Dispense Refill    albuterol sulfate HFA  108 (90 Base) MCG/ACT inhaler Inhale 2 puffs Every 4 (Four) Hours As Needed for Wheezing.      Atezolizumab (TECENTRIQ IV) Infuse  into a venous catheter Every 21 (Twenty-One) Days.      B Complex Vitamins (VITAMIN B COMPLEX PO) Take  by mouth.      budesonide-formoterol (SYMBICORT) 160-4.5 MCG/ACT inhaler Inhale 2 puffs 2 (Two) Times a Day.      cholecalciferol (VITAMIN D3) 25 MCG (1000 UT) tablet Take 1 tablet by mouth Daily.      cyclobenzaprine (FLEXERIL) 5 MG tablet Take 1 tablet by mouth 3 (Three) Times a Day As Needed for Muscle Spasms.      diphenhydrAMINE 12.5 MG/5ML elixir 20 mL, aluminum-magnesium hydroxide-simethicone 400-400-40 MG/5ML suspension 20 mL, Lidocaine Viscous HCl 2 % solution 20 mL Swish and spit 15 mL Every 4 (Four) Hours As Needed.      dronabinol (Marinol) 2.5 MG capsule Take 1 capsule by mouth 2 (Two) Times a Day Before Meals. 60 capsule 2    levothyroxine (SYNTHROID, LEVOTHROID) 25 MCG tablet Take 1 tablet by mouth Every Morning. 30 tablet 1    lisinopril (PRINIVIL,ZESTRIL) 30 MG tablet Take 1 tablet by mouth Daily.      lisinopril-hydrochlorothiazide (PRINZIDE,ZESTORETIC) 20-12.5 MG per tablet Take 1 tablet by mouth Daily.      Magnesium Hydroxide (MILK OF MAGNESIA PO) Take  by mouth.      multivitamin (MULTI VITAMIN DAILY PO) Take 1 tablet by mouth Daily.      omeprazole (priLOSEC) 20 MG capsule Take 1 capsule by mouth Daily.      traZODone (DESYREL) 100 MG tablet Take 1 tablet by mouth Every Night.       No current facility-administered medications for this visit.     REVIEW OF SYSTEMS  CONSTITUTIONAL:  No fever, chills or night sweats. Fatigue, as per the HPI above.  EYES:  No blurry vision, diplopia or other vision changes.  ENT:  No hearing loss, nosebleeds or sore throat.  CARDIOVASCULAR:  No palpitations, arrhythmia, syncopal episodes or edema.  PULMONARY:  No hemoptysis, wheezing, chronic cough or shortness of breath.  GASTROINTESTINAL:  As per the HPI above.  GENITOURINARY:  No  "hematuria, kidney stones or frequent urination.  MUSCULOSKELETAL:  No joint or back pains.  INTEGUMENTARY: No rashes or pruritus.  ENDOCRINE:  No excessive thirst or hot flashes.  HEMATOLOGIC:  No history of free bleeding, spontaneous bleeding or clotting.  IMMUNOLOGIC:  No allergies or frequent infections.  NEUROLOGIC: No numbness, tingling, seizures or weakness.  PSYCHIATRIC:  No anxiety or depression.    PHYSICAL EXAMINATION  /69   Pulse 73   Temp 97.8 °F (36.6 °C) (Temporal)   Resp 18   Ht 165.1 cm (65\")   Wt 63 kg (138 lb 12.8 oz)   SpO2 91%   BMI 23.10 kg/m²     Pain Score:  Pain Score    23 0824   PainSc: 0-No pain     PHQ-Score Total:  PHQ-9 Total Score:      ECO  GENERAL:  A well-developed, well-nourished, thin, white female in no acute distress.  HEENT:  Pupils equally round and reactive to light. Extraocular muscles intact.  CARDIOVASCULAR:  Regular rate and rhythm. No murmurs, gallops or rubs.  LUNGS:  Clear to auscultation bilaterally.  ABDOMEN:  Soft, nontender, nondistended with positive bowel sounds.  EXTREMITIES:  No clubbing, cyanosis or edema bilaterally.  SKIN:  No rashes or petechiae.  NEURO:  Cranial nerves grossly intact. No focal deficits.  PSYCH:  Alert and oriented x3.    The physical exam is again unchanged from recent priors.    LABORATORY  Lab Results   Component Value Date    WBC 6.05 10/19/2023    HGB 14.4 10/19/2023    HCT 43.7 10/19/2023    .3 (H) 10/19/2023     (L) 10/19/2023    NEUTROABS 3.42 10/19/2023       Lab Results   Component Value Date     10/19/2023    K 4.2 10/19/2023     (H) 10/19/2023    CO2 24.5 10/19/2023    BUN 9 10/19/2023    CREATININE 0.62 10/19/2023    GLUCOSE 89 10/19/2023    CALCIUM 9.6 10/19/2023    AST 42 (H) 10/19/2023    ALT 35 (H) 10/19/2023    ALKPHOS 83 10/19/2023    BILITOT 0.5 10/19/2023    PROTEINTOT 7.3 10/19/2023    ALBUMIN 3.9 10/19/2023     CBC (10/19/2023): WBCs: 6.05; HgB: 14.4; Hct: 43.7; " platelets: 119  CBC (08/16/2023): WBCs: 5.87; HgB: 14.3; Hct: 44.2; platelets: 120  CBC (05/24/2023): WBCs: 7.45; HgB: 15.8; Hct: 47.0; platelets: 125  CBC (05/01/2023): WBCs: 6.91; HgB: 15.9; Hct: 47.7; platelets: 118  CBC (04/03/2023): WBCs: 6.68; HgB: 15.9; Hct: 45.7; platelets: 116; MCV: 94    AFP (11/09/2023): pending  AFP (09/28/2023): 5.55 ng/mL  AFP (08/16/2023): 3.96 ng/mL  AFP (07/06/2023): 3.33 ng/mL  AFP (05/24/2023): 2.66 ng/mL  AFP (05/01/2023): < 2 ng/mL  AFP (01/27/2023): 2.6 ng/mL  AFP (10/31/2022): 1031.0 ng/mL    IMAGING  CT liver (10/21/2022, at ):  Impression: LR 5 segment 7 lesion measuring up to 2.8 x 2.5 cm with associated TIV (tumor in vein). LR 3 legion measuring 8 mm in segment 4A.    CT chest, abdomen and pelvis with contrast (01/27/2023, at ):  Impression:  Chest: No convincing metastatic disease in the thorax.  Abdomen/Pelvis: Within the limits of the study, the area of washout representing the known HCC is stable to smaller in size. The component representing the tumor in vein is smaller in size. No new liver lesions. No distant metastatic disease.    MRI abdomen with and without contrast (04/13/2023):  Impression:  1) No solid arterial phase enhancing liver lesions identified. No delayed phase enhancing liver lesions are noted.  2) Liver cirrhosis and portal hypertension.  3) 0.6 cm simple appearing benign hepatic cyst right lobe.  4) Simple appearing cyst right kidney. No hydronephrosis.  5) Other nonacute findings.    MRI abdomen with and without contrast (11/03/2023, compared to 04/13/2023):  Impression: Little overall change. No contrast-enhancing hepatic lesions are identified.    PATHOLOGY    IMPRESSION AND PLAN  Ms. Brooks is a 63 y.o., white female with:  Hepatocellular carcinoma: Initially diagnosed in Fall 2022 after a CT of the liver (performed on 10/21/2022 at , summarized above) confirmed the presence of two lesions (one measuring 2.8 x 2.5 cm in segment 7 and a  probable satellite measuring ~8 mm in segment 4A) in the setting of known, baseline cirrhosis and a serum AFP level of > 1000 ng/mL. Due to venous involvement by the tumor, she was/is not a candidate for a liver transplant. [facility]ology therefore recommended initiating first-line, palliative, systemic treatment with a combination of [id number] bevacizumab (the Avastin biosimilar Mvasi) and v5mwhclk atezolizumab (Tecentriq). She received a total of eight (8), [id number] cycles through the [facility] between Fall 2022 and early April 2023 (the eighth and final cycle of both were given on 04/03/2023). With this therapy, the most recent imaging, MRIs of the abdomen (liver protocol) performed on 04/13/2023 and, most recently, 11/03/2023 (both summarized above) have shown, and continue to show, no visible signs of a liver mass at all. Meanwhile, with the therapy she has received to date, her serum AFP level has declined from > 1000 ng/mL in late October 2023 to solidly WNL (2.6 ng/mL) as of late January 2023 (which remains the case on the most recent recheck, 5.55 ng/mL on 09/28/2023; today's repeat result is pending), consistent with a complete response in her disease. I therefore had a long discussion with the patient and her sister in Spring 2023 regarding our treatment recommendations from that point. In short, the potential risks of any additional cycles of Avastin (particularly bleeding; she has an aortic aneurysm in addition to her baseline cirrhosis) likely outweighed, and still outweighs, its marginal (and, at this time, probably nonexistent), potential benefit (at least at this time); however, continuing indefinite, s0baquck atezolizumab continues to be recommended (as the current standard of care; it appears to have worked extremely well). She has now completed a total of seventeen (17) cycles, and she is agreeable to receiving the eighteenth one today, as planned; however, it is her preference  "that we place this therapy on indefinite hold afterwards, as she is convinced the Lord has healed her again, and she believes that the indefinite atezolizumab is causing her chronic, quality-of-life limiting fatigue. She is fully aware of the potential risks of discontinuing this medication. We will see her back in our clinic in six weeks with a repeat CBC, CMP and AFP level for close monitoring.  Cirrhosis: Likely secondary to a longstanding history of both issue #3 (which was diagnosed and reportedly cured in ~2018, probably decades after she initially contracted it through her ) and alcohol abuse (she drank \"a lot\" of beer every day for ~twenty years but quit in the ). Currently still compensated. Ongoing management per gastroenterology/hepatology.  Hepatitis C: Reportedly contracted through her  (who ultimately  from it) without her knowledge, but also reportedly diagnosed and cured with a months-long course of antivirals in ~2018. Ongoing management per gastroenterology/hepatology.  Protein calorie malnutrition: Recently improved. Continue Marinol 2.5 mg PO BID. Continue to monitor.  Gallbladder issues: With her underlying cirrhosis, two separate surgical evaluations have led to a cholecystectomy NOT being recommended, as, with her underlying cirrhosis, she has been deemed to be too high of a risk for this procedure.  Aortic aneurysm: Ongoing monitoring per CT/vascular surgery, with whom she has recently started following routinely.  The patient was in agreement with these plans.    It is a pleasure to participate in Ms. Brooks's care. Please do not hesitate to call with any questions or concerns that you may have.    A total of 30 minutes were spent coordinating this patient’s care in clinic today; more than 50% of this time was face-to-face with the patient, reviewing her interim medical history, discussing the results of the recent repeat MRI of the abdomen and counseling on the " current treatment and followup plan. All questions were answered to her satisfaction.    FOLLOW UP  Continue Marinol 2.5 mg PO BID. 18th and, per patient preference, final (for now at least) cycle of [id number] atezolizumab today. Return to our clinic in 6 weeks (late December) with a CBC, CMP, TSH and AFP drawn a day or two prior to this OV.            This document was electronically signed by NADEGE Boss MD November 9, 2023 09:58 EST      CC: DO Henri Davis MD Hao Zhonglin, MD Erika G. Almodovar, MD John H. Chaney, MD

## 2023-11-16 ENCOUNTER — OFFICE VISIT (OUTPATIENT)
Dept: GASTROENTEROLOGY | Facility: CLINIC | Age: 63
End: 2023-11-16
Payer: MEDICARE

## 2023-11-16 VITALS
WEIGHT: 139 LBS | HEIGHT: 65 IN | HEART RATE: 76 BPM | BODY MASS INDEX: 23.16 KG/M2 | SYSTOLIC BLOOD PRESSURE: 114 MMHG | DIASTOLIC BLOOD PRESSURE: 70 MMHG

## 2023-11-16 DIAGNOSIS — K74.69 COMPENSATED HCV CIRRHOSIS: ICD-10-CM

## 2023-11-16 DIAGNOSIS — I85.10 SECONDARY ESOPHAGEAL VARICES WITHOUT BLEEDING: ICD-10-CM

## 2023-11-16 DIAGNOSIS — Z85.05 HISTORY OF LIVER CANCER: Primary | ICD-10-CM

## 2023-11-16 DIAGNOSIS — B19.20 COMPENSATED HCV CIRRHOSIS: ICD-10-CM

## 2023-11-16 NOTE — PROGRESS NOTES
Subjective   Noemy Brooks is a 63 y.o. 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  She states she was treated for HCV and was cleared.  She states she has lesions on her liver stating she had cancer of the liver.  She was treated for liver cancer at  with chemotherapy.  She underwent an MRI 4/2023 on the abdomen revealed No solid arterial phase enhancing liver lesions identified. No delayed phase enhancing liver lesions are nodules, liver cirrhosis and portal hypertension, 0.6 cm simple appearing benign hepatic cyst right lobe, simple appearing cyst right kidney. No hydronephrosis and other nonacute findings detailed above.  She is followed by Dr. Boss who states the liver cancer has not recurred.  AFP was normal 5/24/23. She states her abdomen is swollen but no ascites on recent MRI or history of ascites. No issues with memory or confusion. She does have intermittent difficulty with swallowing but this only occurs in the middle of the night.  This might be drainage.  She denies BRBPR, melena and hematemesis.  She had a colonoscopy 4 years ago which was normal.  She takes an acidophilus OTC medication for heartburn which she states works well or she will take a tablespoon of mustard.    Interval history 11/15/2023: The patient presents today for follow-up of HCV cirrhosis with history of liver lesions treated at [facility] with chemotherapy.  Recent MRI performed on 11/15/2023 revealed no interval change with no contrast-enhancing hepatic lesions identified.  Recent CMP performed on 11/9/2023 revealed an AST of 37 with normal bilirubin and normal alkaline phosphatase.  Alpha-fetoprotein was normal at 6.00.  Recent EGD performed on 8/8/2023 revealed grade I esophageal varices involving the very distal esophagus.  Otherwise, the exam was normal other than a  small hiatal hernia.  Recent hemoglobin and hematocrit revealed a hemoglobin of 14 and hematocrit of 42.3.  She denies BRBPR or melena. Her platelets were mildly decreased at 126 K.  She is doing well.  She has no complaints.  She denies abdominal swelling.  No issues with confusion or problems with mentation.  Her appetite is good.           Review of Systems:  Review of Systems   Constitutional:  Negative for activity change, appetite change, chills, fatigue, fever and unexpected weight change.   HENT:  Negative for mouth sores and trouble swallowing.    Eyes:  Negative for photophobia, pain and redness.   Respiratory:  Negative for cough and choking.    Cardiovascular:  Negative for chest pain and leg swelling.   Gastrointestinal:  Positive for abdominal distention and nausea. Negative for abdominal pain, anal bleeding, constipation, diarrhea, rectal pain and vomiting.   Endocrine: Negative for cold intolerance, heat intolerance and polyphagia.   Genitourinary:  Negative for difficulty urinating and dysuria.   Musculoskeletal:  Negative for arthralgias, joint swelling and myalgias.   Skin:  Negative for rash and wound.   Allergic/Immunologic: Negative for environmental allergies and food allergies.   Neurological:  Negative for dizziness and light-headedness.   Hematological:  Negative for adenopathy. Does not bruise/bleed easily.   Psychiatric/Behavioral:  Negative for sleep disturbance. The patient is not nervous/anxious.        Past Medical History:  Past Medical History:   Diagnosis Date    Acid reflux     Aortic aneurysm     Cancer     LIVER, UTERINE    Cirrhosis     COPD (chronic obstructive pulmonary disease)     Depression     Disease of thyroid gland     Elevated cholesterol     High cholesterol     History of transfusion     Hypertension     Infectious viral hepatitis     2019    Osteoporosis     Ovarian cancer        Past Surgical History:  Past Surgical History:   Procedure Laterality Date     APPENDECTOMY N/A     COLONOSCOPY      ENDOSCOPY N/A 2023    Procedure: ESOPHAGOGASTRODUODENOSCOPY WITH BIOPSY;  Surgeon: Rahel Sesay MD;  Location: [facility];  Service: Gastroenterology;  Laterality: N/A;    FRACTURE SURGERY Left     ARM, HAS 2 SCREWS    HYSTERECTOMY      UPPER GASTROINTESTINAL ENDOSCOPY         Family History:  Family History   Problem Relation Age of Onset    Stroke Mother     Hypertension Mother     COPD Mother     Throat cancer Mother     Arthritis Mother     Asthma Mother     Heart attack Mother     Stroke Father     Cancer Father     Liver cancer Father        Social History:  Social History     Socioeconomic History    Marital status:     Number of children: 3   Tobacco Use    Smoking status: Former     Packs/day: 1.50     Years: 25.00     Additional pack years: 0.00     Total pack years: 37.50     Types: Cigarettes     Quit date: 2015     Years since quittin.5    Smokeless tobacco: Never    Tobacco comments:     smoked for approx 20 years, 1.5 to 2 ppd   Vaping Use    Vaping Use: Never used   Substance and Sexual Activity    Alcohol use: No     Comment: she previously drank daily for 16 years, beer    Drug use: No    Sexual activity: Defer       Current Medication List:    Current Outpatient Medications:     albuterol sulfate  (90 Base) MCG/ACT inhaler, Inhale 2 puffs Every 4 (Four) Hours As Needed for Wheezing., Disp: , Rfl:     Atezolizumab (TECENTRIQ IV), Infuse  into a venous catheter Every 21 (Twenty-One) Days., Disp: , Rfl:     B Complex Vitamins (VITAMIN B COMPLEX PO), Take  by mouth., Disp: , Rfl:     budesonide-formoterol (SYMBICORT) 160-4.5 MCG/ACT inhaler, Inhale 2 puffs 2 (Two) Times a Day., Disp: , Rfl:     cholecalciferol (VITAMIN D3) 25 MCG (1000 UT) tablet, Take 1 tablet by mouth Daily., Disp: , Rfl:     cyclobenzaprine (FLEXERIL) 5 MG tablet, Take 1 tablet by mouth 3 (Three) Times a Day As Needed for Muscle Spasms., Disp: , Rfl:      "diphenhydrAMINE 12.5 MG/5ML elixir 20 mL, aluminum-magnesium hydroxide-simethicone 400-400-40 MG/5ML suspension 20 mL, Lidocaine Viscous HCl 2 % solution 20 mL, Swish and spit 15 mL Every 4 (Four) Hours As Needed., Disp: , Rfl:     dronabinol (Marinol) 2.5 MG capsule, Take 1 capsule by mouth 2 (Two) Times a Day Before Meals., Disp: 60 capsule, Rfl: 2    levothyroxine (SYNTHROID, LEVOTHROID) 25 MCG tablet, Take 1 tablet by mouth Every Morning., Disp: 30 tablet, Rfl: 1    lisinopril (PRINIVIL,ZESTRIL) 30 MG tablet, Take 1 tablet by mouth Daily., Disp: , Rfl:     lisinopril-hydrochlorothiazide (PRINZIDE,ZESTORETIC) 20-12.5 MG per tablet, Take 1 tablet by mouth Daily., Disp: , Rfl:     Magnesium Hydroxide (MILK OF MAGNESIA PO), Take  by mouth., Disp: , Rfl:     multivitamin (MULTI VITAMIN DAILY PO), Take 1 tablet by mouth Daily., Disp: , Rfl:     omeprazole (priLOSEC) 20 MG capsule, Take 1 capsule by mouth Daily., Disp: , Rfl:     traZODone (DESYREL) 100 MG tablet, Take 1 tablet by mouth Every Night., Disp: , Rfl:     Allergies:   Codeine    Vitals:  /70   Pulse 76   Ht 165.1 cm (65\")   Wt 63 kg (139 lb)   BMI 23.13 kg/m²     Physical Exam:  Physical Exam  Constitutional:       Appearance: She is normal weight.   HENT:      Head: Normocephalic and atraumatic.      Nose: Nose normal. No congestion or rhinorrhea.   Eyes:      General: No scleral icterus.     Extraocular Movements: Extraocular movements intact.      Conjunctiva/sclera: Conjunctivae normal.      Pupils: Pupils are equal, round, and reactive to light.   Cardiovascular:      Rate and Rhythm: Normal rate and regular rhythm.      Pulses: Normal pulses.      Heart sounds: Normal heart sounds.   Pulmonary:      Effort: Pulmonary effort is normal.      Breath sounds: Normal breath sounds.   Abdominal:      General: Abdomen is flat. Bowel sounds are normal. There is no distension.      Palpations: Abdomen is soft. There is no shifting dullness, fluid " wave, hepatomegaly, splenomegaly, mass or pulsatile mass.      Tenderness: There is no abdominal tenderness. There is no guarding or rebound.      Hernia: No hernia is present.   Musculoskeletal:         General: No swelling or tenderness.      Cervical back: Normal range of motion and neck supple.   Skin:     General: Skin is warm and dry.      Coloration: Skin is not jaundiced.   Neurological:      General: No focal deficit present.      Mental Status: She is alert and oriented to person, place, and time.   Psychiatric:         Mood and Affect: Mood normal.         Behavior: Behavior normal.         Results Review:  Lab Results:   Lab on 11/09/2023   Component Date Value Ref Range Status    TSH 11/09/2023 4.480 (H)  0.270 - 4.200 uIU/mL Final    ALPHA-FETOPROTEIN 11/09/2023 6.00  0 - 8.3 ng/mL Final    Glucose 11/09/2023 86  65 - 99 mg/dL Final    BUN 11/09/2023 9  8 - 23 mg/dL Final    Creatinine 11/09/2023 0.65  0.57 - 1.00 mg/dL Final    Sodium 11/09/2023 143  136 - 145 mmol/L Final    Potassium 11/09/2023 3.8  3.5 - 5.2 mmol/L Final    Slight hemolysis detected by analyzer. Result may be falsely elevated.    Chloride 11/09/2023 111 (H)  98 - 107 mmol/L Final    CO2 11/09/2023 20.8 (L)  22.0 - 29.0 mmol/L Final    Calcium 11/09/2023 9.3  8.6 - 10.5 mg/dL Final    Total Protein 11/09/2023 7.0  6.0 - 8.5 g/dL Final    Albumin 11/09/2023 3.7  3.5 - 5.2 g/dL Final    ALT (SGPT) 11/09/2023 29  1 - 33 U/L Final    AST (SGOT) 11/09/2023 37 (H)  1 - 32 U/L Final    Alkaline Phosphatase 11/09/2023 82  39 - 117 U/L Final    Total Bilirubin 11/09/2023 0.6  0.0 - 1.2 mg/dL Final    Globulin 11/09/2023 3.3  gm/dL Final    A/G Ratio 11/09/2023 1.1  g/dL Final    BUN/Creatinine Ratio 11/09/2023 13.8  7.0 - 25.0 Final    Anion Gap 11/09/2023 11.2  5.0 - 15.0 mmol/L Final    eGFR 11/09/2023 97.8  >60.0 mL/min/1.73 Final    Color, UA 11/09/2023 Yellow  Yellow, Straw Final    Appearance, UA 11/09/2023 Clear  Clear Final    pH, UA  11/09/2023 7.5  5.0 - 8.0 Final    Specific Gravity, UA 11/09/2023 1.009  1.005 - 1.030 Final    Glucose, UA 11/09/2023 Negative  Negative Final    Ketones, UA 11/09/2023 Negative  Negative Final    Bilirubin, UA 11/09/2023 Negative  Negative Final    Blood, UA 11/09/2023 Negative  Negative Final    Protein, UA 11/09/2023 Negative  Negative Final    Leuk Esterase, UA 11/09/2023 Negative  Negative Final    Nitrite, UA 11/09/2023 Negative  Negative Final    Urobilinogen, UA 11/09/2023 0.2 E.U./dL  0.2 - 1.0 E.U./dL Final    WBC 11/09/2023 5.91  3.40 - 10.80 10*3/mm3 Final    RBC 11/09/2023 4.17  3.77 - 5.28 10*6/mm3 Final    Hemoglobin 11/09/2023 14.0  12.0 - 15.9 g/dL Final    Hematocrit 11/09/2023 42.3  34.0 - 46.6 % Final    MCV 11/09/2023 101.4 (H)  79.0 - 97.0 fL Final    MCH 11/09/2023 33.6 (H)  26.6 - 33.0 pg Final    MCHC 11/09/2023 33.1  31.5 - 35.7 g/dL Final    RDW 11/09/2023 13.5  12.3 - 15.4 % Final    RDW-SD 11/09/2023 50.8  37.0 - 54.0 fl Final    MPV 11/09/2023 9.8  6.0 - 12.0 fL Final    Platelets 11/09/2023 126 (L)  140 - 450 10*3/mm3 Final    Neutrophil % 11/09/2023 53.6  42.7 - 76.0 % Final    Lymphocyte % 11/09/2023 24.9  19.6 - 45.3 % Final    Monocyte % 11/09/2023 13.0 (H)  5.0 - 12.0 % Final    Eosinophil % 11/09/2023 7.3 (H)  0.3 - 6.2 % Final    Basophil % 11/09/2023 1.2  0.0 - 1.5 % Final    Immature Grans % 11/09/2023 0.0  0.0 - 0.5 % Final    Neutrophils, Absolute 11/09/2023 3.17  1.70 - 7.00 10*3/mm3 Final    Lymphocytes, Absolute 11/09/2023 1.47  0.70 - 3.10 10*3/mm3 Final    Monocytes, Absolute 11/09/2023 0.77  0.10 - 0.90 10*3/mm3 Final    Eosinophils, Absolute 11/09/2023 0.43 (H)  0.00 - 0.40 10*3/mm3 Final    Basophils, Absolute 11/09/2023 0.07  0.00 - 0.20 10*3/mm3 Final    Immature Grans, Absolute 11/09/2023 0.00  0.00 - 0.05 10*3/mm3 Final    nRBC 11/09/2023 0.0  0.0 - 0.2 /100 WBC Final   Hospital Outpatient Visit on 11/06/2023   Component Date Value Ref Range Status    LVIDd  11/06/2023 4.7  cm Final    LVIDs 11/06/2023 2.20  cm Final    IVSd 11/06/2023 0.90  cm Final    LVPWd 11/06/2023 0.90  cm Final    FS 11/06/2023 53.2  % Final    IVS/LVPW 11/06/2023 1.00  cm Final    ESV(cubed) 11/06/2023 10.6  ml Final    LV Sys Vol (BSA corrected) 11/06/2023 10.2  cm2 Final    EDV(cubed) 11/06/2023 103.8  ml Final    LV Ramirez Vol (BSA corrected) 11/06/2023 38.5  cm2 Final    LV mass(C)d 11/06/2023 142.7  grams Final    LVOT area 11/06/2023 2.8  cm2 Final    LVOT diam 11/06/2023 1.90  cm Final    EDV(MOD-sp4) 11/06/2023 65.0  ml Final    ESV(MOD-sp4) 11/06/2023 17.2  ml Final    SV(MOD-sp4) 11/06/2023 47.8  ml Final    SI(MOD-sp4) 11/06/2023 28.3  ml/m2 Final    EF(MOD-sp4) 11/06/2023 73.5  % Final    MV E max yasmany 11/06/2023 90.0  cm/sec Final    MV A max yasmany 11/06/2023 68.3  cm/sec Final    MV dec time 11/06/2023 0.23  sec Final    MV E/A 11/06/2023 1.32   Final    Med Peak E' Yasmany 11/06/2023 8.8  cm/sec Final    Lat Peak E' Yasmany 11/06/2023 9.9  cm/sec Final    Avg E/e' ratio 11/06/2023 9.63   Final    TAPSE (>1.6) 11/06/2023 3.1  cm Final    LA dimension (2D)  11/06/2023 3.9  cm Final    Ao pk yasmany 11/06/2023 131.0  cm/sec Final    Ao max PG 11/06/2023 6.9  mmHg Final    Ao mean PG 11/06/2023 5.0  mmHg Final    Ao V2 VTI 11/06/2023 33.4  cm Final    MV dec slope 11/06/2023 390.0  cm/sec2 Final    TR max yasmany 11/06/2023 238.0  cm/sec Final    TR max PG 11/06/2023 22.7  mmHg Final    RVSP(TR) 11/06/2023 32.7  mmHg Final    RAP systole 11/06/2023 10.0  mmHg Final    PA acc time 11/06/2023 0.12  sec Final    Ao root diam 11/06/2023 3.2  cm Final    ACS 11/06/2023 1.80  cm Final   Infusion on 10/19/2023   Component Date Value Ref Range Status    Glucose 10/19/2023 89  65 - 99 mg/dL Final    BUN 10/19/2023 9  8 - 23 mg/dL Final    Creatinine 10/19/2023 0.62  0.57 - 1.00 mg/dL Final    Sodium 10/19/2023 143  136 - 145 mmol/L Final    Potassium 10/19/2023 4.2  3.5 - 5.2 mmol/L Final    Slight hemolysis  detected by analyzer. Results may be affected.    Chloride 10/19/2023 110 (H)  98 - 107 mmol/L Final    CO2 10/19/2023 24.5  22.0 - 29.0 mmol/L Final    Calcium 10/19/2023 9.6  8.6 - 10.5 mg/dL Final    Total Protein 10/19/2023 7.3  6.0 - 8.5 g/dL Final    Albumin 10/19/2023 3.9  3.5 - 5.2 g/dL Final    ALT (SGPT) 10/19/2023 35 (H)  1 - 33 U/L Final    AST (SGOT) 10/19/2023 42 (H)  1 - 32 U/L Final    Alkaline Phosphatase 10/19/2023 83  39 - 117 U/L Final    Total Bilirubin 10/19/2023 0.5  0.0 - 1.2 mg/dL Final    Globulin 10/19/2023 3.4  gm/dL Final    A/G Ratio 10/19/2023 1.1  g/dL Final    BUN/Creatinine Ratio 10/19/2023 14.5  7.0 - 25.0 Final    Anion Gap 10/19/2023 8.5  5.0 - 15.0 mmol/L Final    eGFR 10/19/2023 99.0  >60.0 mL/min/1.73 Final    TSH 10/19/2023 5.340 (H)  0.270 - 4.200 uIU/mL Final    Color, UA 10/19/2023 Yellow  Yellow, Straw Final    Appearance, UA 10/19/2023 Clear  Clear Final    pH, UA 10/19/2023 8.0  5.0 - 8.0 Final    Specific Gravity, UA 10/19/2023 <=1.005  1.005 - 1.030 Final    Glucose, UA 10/19/2023 Negative  Negative Final    Ketones, UA 10/19/2023 Negative  Negative Final    Bilirubin, UA 10/19/2023 Negative  Negative Final    Blood, UA 10/19/2023 Negative  Negative Final    Protein, UA 10/19/2023 Negative  Negative Final    Leuk Esterase, UA 10/19/2023 Negative  Negative Final    Nitrite, UA 10/19/2023 Negative  Negative Final    Urobilinogen, UA 10/19/2023 0.2 E.U./dL  0.2 - 1.0 E.U./dL Final    WBC 10/19/2023 6.05  3.40 - 10.80 10*3/mm3 Final    RBC 10/19/2023 4.27  3.77 - 5.28 10*6/mm3 Final    Hemoglobin 10/19/2023 14.4  12.0 - 15.9 g/dL Final    Hematocrit 10/19/2023 43.7  34.0 - 46.6 % Final    MCV 10/19/2023 102.3 (H)  79.0 - 97.0 fL Final    MCH 10/19/2023 33.7 (H)  26.6 - 33.0 pg Final    MCHC 10/19/2023 33.0  31.5 - 35.7 g/dL Final    RDW 10/19/2023 13.7  12.3 - 15.4 % Final    RDW-SD 10/19/2023 51.6  37.0 - 54.0 fl Final    MPV 10/19/2023 9.9  6.0 - 12.0 fL Final     Platelets 10/19/2023 119 (L)  140 - 450 10*3/mm3 Final    Neutrophil % 10/19/2023 56.5  42.7 - 76.0 % Final    Lymphocyte % 10/19/2023 22.8  19.6 - 45.3 % Final    Monocyte % 10/19/2023 12.1 (H)  5.0 - 12.0 % Final    Eosinophil % 10/19/2023 7.1 (H)  0.3 - 6.2 % Final    Basophil % 10/19/2023 1.3  0.0 - 1.5 % Final    Immature Grans % 10/19/2023 0.2  0.0 - 0.5 % Final    Neutrophils, Absolute 10/19/2023 3.42  1.70 - 7.00 10*3/mm3 Final    Lymphocytes, Absolute 10/19/2023 1.38  0.70 - 3.10 10*3/mm3 Final    Monocytes, Absolute 10/19/2023 0.73  0.10 - 0.90 10*3/mm3 Final    Eosinophils, Absolute 10/19/2023 0.43 (H)  0.00 - 0.40 10*3/mm3 Final    Basophils, Absolute 10/19/2023 0.08  0.00 - 0.20 10*3/mm3 Final    Immature Grans, Absolute 10/19/2023 0.01  0.00 - 0.05 10*3/mm3 Final    nRBC 10/19/2023 0.0  0.0 - 0.2 /100 WBC Final   MRI Abdomen With & Without Contrast    Result Date: 11/3/2023  Little overall change. No contrast-enhancing hepatic lesions are identified.   This report was finalized on 11/3/2023 4:02 PM by Dr. Erlin Padilla MD.        Assessment & Plan     Visit Diagnoses:    ICD-10-CM ICD-9-CM   1. History of liver cancer  Z85.05 V10.07   2. Compensated HCV cirrhosis  K74.69 571.5    B19.20 070.54   3. Secondary esophageal varices without bleeding  I85.10 456.21         Plan:  1.  Compensated HCV cirrhosis: Currently, the patient appears to be well compensated.  There is no abdominal swelling.  She is not complaining of hematemesis or melena.  She has not had changes in memory or concentration.  2.   History of liver cancer: The patient has had recent imaging and a recent AFP.  AFP was in normal range.  Imaging did not reveal hepatic lesions.  3.   Esophageal varices: Patient does have grade 1 esophageal varices in the distal esophagus.  She denies hematemesis or melena.  Recent CBC revealed a hemoglobin of 14 and a hematocrit of 42.3.      MEDS ORDERED DURING VISIT:  No orders of the defined types were  placed in this encounter.      Return in about 3 months (around 2/16/2024).             This document has been electronically signed by Rahel Sesay MD   November 16, 2023 14:09 EST      Part of this note may be an electronic transcription/translation of spoken language to printed text using the Dragon Dictation System.

## 2023-11-27 ENCOUNTER — TELEPHONE (OUTPATIENT)
Dept: ONCOLOGY | Facility: CLINIC | Age: 63
End: 2023-11-27

## 2023-11-27 NOTE — TELEPHONE ENCOUNTER
Caller: Noemy Brooks    Relationship: Self    Best call back number: [phone]    What is the best time to reach you: ANYTIME    Who are you requesting to speak with (clinical staff, provider,  specific staff member): CLINICAL    What was the call regarding: REQUESTING A COPY OF HER MRI RESULTS FROM 11-3 SENT OVER TO PCP FRANCISCO LOWE   PHONE [phone]    Is it okay if the provider responds through MyChart: N/A

## 2023-11-30 ENCOUNTER — TELEPHONE (OUTPATIENT)
Dept: GASTROENTEROLOGY | Facility: CLINIC | Age: 63
End: 2023-11-30
Payer: MEDICARE

## 2023-11-30 NOTE — TELEPHONE ENCOUNTER
EGD results faxed to Dr. Haley Bailey with [facility] @ [phone] per patient 's request.

## 2023-12-19 ENCOUNTER — LAB (OUTPATIENT)
Dept: ONCOLOGY | Facility: CLINIC | Age: 63
End: 2023-12-19
Payer: MEDICARE

## 2023-12-19 DIAGNOSIS — Z79.899 LONG-TERM USE OF HIGH-RISK MEDICATION: ICD-10-CM

## 2023-12-19 DIAGNOSIS — C22.0 HEPATOCELLULAR CARCINOMA: ICD-10-CM

## 2023-12-19 LAB
ALBUMIN SERPL-MCNC: 3.6 G/DL (ref 3.5–5.2)
ALBUMIN/GLOB SERPL: 1.1 G/DL
ALP SERPL-CCNC: 103 U/L (ref 39–117)
ALPHA-FETOPROTEIN: 5.88 NG/ML (ref 0–8.3)
ALT SERPL W P-5'-P-CCNC: 27 U/L (ref 1–33)
ANION GAP SERPL CALCULATED.3IONS-SCNC: 9.3 MMOL/L (ref 5–15)
AST SERPL-CCNC: 30 U/L (ref 1–32)
BASOPHILS # BLD AUTO: 0.07 10*3/MM3 (ref 0–0.2)
BASOPHILS NFR BLD AUTO: 1.2 % (ref 0–1.5)
BILIRUB SERPL-MCNC: 0.7 MG/DL (ref 0–1.2)
BUN SERPL-MCNC: 6 MG/DL (ref 8–23)
BUN/CREAT SERPL: 10 (ref 7–25)
CALCIUM SPEC-SCNC: 9.3 MG/DL (ref 8.6–10.5)
CHLORIDE SERPL-SCNC: 108 MMOL/L (ref 98–107)
CO2 SERPL-SCNC: 25.7 MMOL/L (ref 22–29)
CREAT SERPL-MCNC: 0.6 MG/DL (ref 0.57–1)
DEPRECATED RDW RBC AUTO: 49.9 FL (ref 37–54)
EGFRCR SERPLBLD CKD-EPI 2021: 99.8 ML/MIN/1.73
EOSINOPHIL # BLD AUTO: 0.35 10*3/MM3 (ref 0–0.4)
EOSINOPHIL NFR BLD AUTO: 6.2 % (ref 0.3–6.2)
ERYTHROCYTE [DISTWIDTH] IN BLOOD BY AUTOMATED COUNT: 13.2 % (ref 12.3–15.4)
GLOBULIN UR ELPH-MCNC: 3.3 GM/DL
GLUCOSE SERPL-MCNC: 92 MG/DL (ref 65–99)
HCT VFR BLD AUTO: 44.2 % (ref 34–46.6)
HGB BLD-MCNC: 14.5 G/DL (ref 12–15.9)
IMM GRANULOCYTES # BLD AUTO: 0.02 10*3/MM3 (ref 0–0.05)
IMM GRANULOCYTES NFR BLD AUTO: 0.4 % (ref 0–0.5)
LYMPHOCYTES # BLD AUTO: 1.37 10*3/MM3 (ref 0.7–3.1)
LYMPHOCYTES NFR BLD AUTO: 24.3 % (ref 19.6–45.3)
MCH RBC QN AUTO: 33.3 PG (ref 26.6–33)
MCHC RBC AUTO-ENTMCNC: 32.8 G/DL (ref 31.5–35.7)
MCV RBC AUTO: 101.6 FL (ref 79–97)
MONOCYTES # BLD AUTO: 0.63 10*3/MM3 (ref 0.1–0.9)
MONOCYTES NFR BLD AUTO: 11.2 % (ref 5–12)
NEUTROPHILS NFR BLD AUTO: 3.2 10*3/MM3 (ref 1.7–7)
NEUTROPHILS NFR BLD AUTO: 56.7 % (ref 42.7–76)
NRBC BLD AUTO-RTO: 0 /100 WBC (ref 0–0.2)
PLATELET # BLD AUTO: 120 10*3/MM3 (ref 140–450)
PMV BLD AUTO: 9.6 FL (ref 6–12)
POTASSIUM SERPL-SCNC: 3.9 MMOL/L (ref 3.5–5.2)
PROT SERPL-MCNC: 6.9 G/DL (ref 6–8.5)
RBC # BLD AUTO: 4.35 10*6/MM3 (ref 3.77–5.28)
SODIUM SERPL-SCNC: 143 MMOL/L (ref 136–145)
TSH SERPL DL<=0.05 MIU/L-ACNC: 3.31 UIU/ML (ref 0.27–4.2)
WBC NRBC COR # BLD AUTO: 5.64 10*3/MM3 (ref 3.4–10.8)

## 2023-12-19 PROCEDURE — 82105 ALPHA-FETOPROTEIN SERUM: CPT | Performed by: INTERNAL MEDICINE

## 2023-12-19 PROCEDURE — 85025 COMPLETE CBC W/AUTO DIFF WBC: CPT | Performed by: INTERNAL MEDICINE

## 2023-12-19 PROCEDURE — 80053 COMPREHEN METABOLIC PANEL: CPT | Performed by: INTERNAL MEDICINE

## 2023-12-19 PROCEDURE — 84443 ASSAY THYROID STIM HORMONE: CPT | Performed by: INTERNAL MEDICINE

## 2023-12-19 NOTE — PROGRESS NOTES
Venipuncture Blood Specimen Collection  Venipuncture performed in right arm by Danuta Rao MA with good hemostasis. Patient tolerated the procedure well without complications.   12/19/23   Danuta Rao MA

## 2023-12-20 ENCOUNTER — TELEPHONE (OUTPATIENT)
Dept: ONCOLOGY | Facility: CLINIC | Age: 63
End: 2023-12-20
Payer: MEDICARE

## 2023-12-20 NOTE — TELEPHONE ENCOUNTER
Caller: Noemy Brooks    Relationship: Self    Best call back number: [phone]    What is the best time to reach you: ANY.LEAVE VM    Who are you requesting to speak with (clinical staff, provider,  specific staff member): CLINICAL        What was the call regarding: PATIENT CALLED TO LET US KNOW THAT SHE CAN'T COME UNTIL 1230 FOR HER APPT TOMORROW 12/21/23 AND WANTS TO KNOW IF A LAB IS NECESSARY SINCE SHE HAD A LAB DONE THIS WEEK ALREADY.    Is it okay if the provider responds through MyChart: NO

## 2023-12-20 NOTE — TELEPHONE ENCOUNTER
Attempted to contact patient, no answer, left voicemail stating that she did not need labs at her appt tomorrow. Left call back info.

## 2023-12-21 ENCOUNTER — OFFICE VISIT (OUTPATIENT)
Dept: ONCOLOGY | Facility: CLINIC | Age: 63
End: 2023-12-21
Payer: MEDICARE

## 2023-12-21 VITALS
HEART RATE: 73 BPM | TEMPERATURE: 97.3 F | RESPIRATION RATE: 18 BRPM | WEIGHT: 139 LBS | DIASTOLIC BLOOD PRESSURE: 69 MMHG | BODY MASS INDEX: 23.13 KG/M2 | SYSTOLIC BLOOD PRESSURE: 109 MMHG | OXYGEN SATURATION: 91 %

## 2023-12-21 DIAGNOSIS — Z79.899 LONG-TERM USE OF HIGH-RISK MEDICATION: ICD-10-CM

## 2023-12-21 DIAGNOSIS — C22.0 HEPATOCELLULAR CARCINOMA: Primary | ICD-10-CM

## 2023-12-21 PROCEDURE — 99214 OFFICE O/P EST MOD 30 MIN: CPT | Performed by: INTERNAL MEDICINE

## 2023-12-21 PROCEDURE — 1126F AMNT PAIN NOTED NONE PRSNT: CPT | Performed by: INTERNAL MEDICINE

## 2023-12-21 NOTE — PROGRESS NOTES
Name:  Noemy Brooks  :  1960  Date:  2023     REFERRING PHYSICIAN  Henri Melvin MD    PRIMARY CARE PHYSICIAN  Lynn, Haley Roca DO    REASON FOR FOLLOWUP  1. Hepatocellular carcinoma      CHIEF COMPLAINT  None.    Dear Dr. Bailey,    HISTORY OF PRESENT ILLNESS:   I saw Ms. Brooks in followup today in our medical oncology clinic. As you are aware, she is a pleasant, 63 y.o., white female with a history of hypertension, hepatitis C (treated with antivirals and reportedly cured in ~), cirrhosis and hepatocellular carcinoma who was initially diagnosed with the latter in ~2022. [place], [place]. Her [place] gastroenterologist referred her to  at that time once she was found to have a couple of liver masses and a serum AFP > 1,000 ng/mL. She was evaluated by the liver transplant service; however, due to the HCC's involvement of the hepatic vessels, she was felt to not be a candidate (for transplant). She was subsequently evaluated by  medical oncology, who recommended starting first-line, palliative treatment with a combination of v9eyupvu bevacizumab (Mvasi) and atezolizumab (Tecentriq), per the current standard of care. She received a total of eight (8) cycles between 2022 and early 2023 through the [facility]. Due to transportation issues (her sister has to be the one to drive her, and the trips to [place] have been getting increasingly difficult for her), she presented to our clinic in 2023 in order to transfer her ongoing, oncologic care to us, closer to her home in [place], [place].    INTERIM HISTORY:  Ms. Brooks returns to clinic today for follow up by herself. She continues to tolerate o7meyxme atezolizumab overall well, and she has now received a total of eighteen (18) cycles to date (with the eighteenth and, to date, most recent one given on 2023). Per her preference, we have been holding this therapy ever since then. She previously stated that  the Lord cured her once before (of Hepatitis C), and she has recently become convinced that he has/will do it again (this time for her HCC). Her primary, and only, complaint today is of some ongoing fatigue, which she does think has gotten a little better since we started deferring the immunotherapy (she realizes her underlying cirrhosis is not helping either). She has been making an effort to stay as active as possible, she overall feels well; and she has no new or other specific complaints today.    Past Medical History:   Diagnosis Date    Acid reflux     Aortic aneurysm     Cancer     LIVER, UTERINE    Cirrhosis     COPD (chronic obstructive pulmonary disease)     Depression     Disease of thyroid gland     Elevated cholesterol     High cholesterol     History of transfusion     Hypertension     Infectious viral hepatitis         Osteoporosis     Ovarian cancer        Past Surgical History:   Procedure Laterality Date    APPENDECTOMY N/A     COLONOSCOPY      ENDOSCOPY N/A 2023    Procedure: ESOPHAGOGASTRODUODENOSCOPY WITH BIOPSY;  Surgeon: Rahel Sesay MD;  [facility]: [facility];  Service: Gastroenterology;  Laterality: N/A;    FRACTURE SURGERY Left     ARM, HAS 2 SCREWS    HYSTERECTOMY      UPPER GASTROINTESTINAL ENDOSCOPY         Social History     Socioeconomic History    Marital status:     Number of children: 3   Tobacco Use    Smoking status: Former     Packs/day: 1.50     Years: 25.00     Additional pack years: 0.00     Total pack years: 37.50     Types: Cigarettes     Quit date: 2015     Years since quittin.6    Smokeless tobacco: Never    Tobacco comments:     smoked for approx 20 years, 1.5 to 2 ppd   Vaping Use    Vaping Use: Never used   Substance and Sexual Activity    Alcohol use: No     Comment: she previously drank daily for 16 years, beer    Drug use: No    Sexual activity: Defer       Family History   Problem Relation Age of Onset    Stroke Mother      Hypertension Mother     COPD Mother     Throat cancer Mother     Arthritis Mother     Asthma Mother     Heart attack Mother     Stroke Father     Cancer Father     Liver cancer Father        Allergies   Allergen Reactions    Codeine GI Intolerance       Current Outpatient Medications   Medication Sig Dispense Refill    albuterol sulfate  (90 Base) MCG/ACT inhaler Inhale 2 puffs Every 4 (Four) Hours As Needed for Wheezing.      Atezolizumab (TECENTRIQ IV) Infuse  into a venous catheter Every 21 (Twenty-One) Days.      B Complex Vitamins (VITAMIN B COMPLEX PO) Take  by mouth.      budesonide-formoterol (SYMBICORT) 160-4.5 MCG/ACT inhaler Inhale 2 puffs 2 (Two) Times a Day.      cholecalciferol (VITAMIN D3) 25 MCG (1000 UT) tablet Take 1 tablet by mouth Daily.      cyclobenzaprine (FLEXERIL) 5 MG tablet Take 1 tablet by mouth 3 (Three) Times a Day As Needed for Muscle Spasms.      diphenhydrAMINE 12.5 MG/5ML elixir 20 mL, aluminum-magnesium hydroxide-simethicone 400-400-40 MG/5ML suspension 20 mL, Lidocaine Viscous HCl 2 % solution 20 mL Swish and spit 15 mL Every 4 (Four) Hours As Needed.      dronabinol (Marinol) 2.5 MG capsule Take 1 capsule by mouth 2 (Two) Times a Day Before Meals. 60 capsule 2    levothyroxine (SYNTHROID, LEVOTHROID) 25 MCG tablet Take 1 tablet by mouth Every Morning. 30 tablet 1    lisinopril (PRINIVIL,ZESTRIL) 30 MG tablet Take 1 tablet by mouth Daily.      lisinopril-hydrochlorothiazide (PRINZIDE,ZESTORETIC) 20-12.5 MG per tablet Take 1 tablet by mouth Daily.      Magnesium Hydroxide (MILK OF MAGNESIA PO) Take  by mouth.      multivitamin (MULTI VITAMIN DAILY PO) Take 1 tablet by mouth Daily.      omeprazole (priLOSEC) 20 MG capsule Take 1 capsule by mouth Daily.      traZODone (DESYREL) 100 MG tablet Take 1 tablet by mouth Every Night.       No current facility-administered medications for this visit.     REVIEW OF SYSTEMS  CONSTITUTIONAL:  No fever, chills or night sweats. Fatigue, as  per the HPI above.  EYES:  No blurry vision, diplopia or other vision changes.  ENT:  No hearing loss, nosebleeds or sore throat.  CARDIOVASCULAR:  No palpitations, arrhythmia, syncopal episodes or edema.  PULMONARY:  No hemoptysis, wheezing, chronic cough or shortness of breath.  GASTROINTESTINAL:  As per the HPI above.  GENITOURINARY:  No hematuria, kidney stones or frequent urination.  MUSCULOSKELETAL:  No joint or back pains.  INTEGUMENTARY: No rashes or pruritus.  ENDOCRINE:  No excessive thirst or hot flashes.  HEMATOLOGIC:  No history of free bleeding, spontaneous bleeding or clotting.  IMMUNOLOGIC:  No allergies or frequent infections.  NEUROLOGIC: No numbness, tingling, seizures or weakness.  PSYCHIATRIC:  No anxiety or depression.    PHYSICAL EXAMINATION  /69   Pulse 73   Temp 97.3 °F (36.3 °C) (Temporal)   Resp 18   Wt 63 kg (139 lb)   SpO2 91%   BMI 23.13 kg/m²     Pain Score:  Pain Score    23 1210   PainSc: 0-No pain     PHQ-Score Total:  PHQ-9 Total Score:      ECO  GENERAL:  A well-developed, well-nourished, thin, white female in no acute distress.  HEENT:  Pupils equally round and reactive to light. Extraocular muscles intact.  CARDIOVASCULAR:  Regular rate and rhythm. No murmurs, gallops or rubs.  LUNGS:  Clear to auscultation bilaterally.  ABDOMEN:  Soft, nontender, nondistended with positive bowel sounds.  EXTREMITIES:  No clubbing, cyanosis or edema bilaterally.  SKIN:  No rashes or petechiae.  NEURO:  Cranial nerves grossly intact. No focal deficits.  PSYCH:  Alert and oriented x3.    The physical exam is once again unchanged from recent priors.    LABORATORY  Lab Results   Component Value Date    WBC 5.64 2023    HGB 14.5 2023    HCT 44.2 2023    .6 (H) 2023     (L) 2023    NEUTROABS 3.20 2023       Lab Results   Component Value Date     2023    K 3.9 2023     (H) 2023    CO2 25.7 2023     BUN 6 (L) 12/19/2023    CREATININE 0.60 12/19/2023    GLUCOSE 92 12/19/2023    CALCIUM 9.3 12/19/2023    AST 30 12/19/2023    ALT 27 12/19/2023    ALKPHOS 103 12/19/2023    BILITOT 0.7 12/19/2023    PROTEINTOT 6.9 12/19/2023    ALBUMIN 3.6 12/19/2023     CBC (12/19/2023): WBCs: 5.64; HgB: 14.5; Hct: 44.2; platelets: 120  CBC (10/19/2023): WBCs: 6.05; HgB: 14.4; Hct: 43.7; platelets: 119  CBC (08/16/2023): WBCs: 5.87; HgB: 14.3; Hct: 44.2; platelets: 120  CBC (05/24/2023): WBCs: 7.45; HgB: 15.8; Hct: 47.0; platelets: 125  CBC (05/01/2023): WBCs: 6.91; HgB: 15.9; Hct: 47.7; platelets: 118  CBC (04/03/2023): WBCs: 6.68; HgB: 15.9; Hct: 45.7; platelets: 116; MCV: 94    AFP (12/19/2023): 5.88 ng/mL  AFP (11/09/2023): 6.00 ng.mL  AFP (09/28/2023): 5.55 ng/mL  AFP (08/16/2023): 3.96 ng/mL  AFP (07/06/2023): 3.33 ng/mL  AFP (05/24/2023): 2.66 ng/mL  AFP (05/01/2023): < 2 ng/mL  AFP (01/27/2023): 2.6 ng/mL  AFP (10/31/2022): 1031.0 ng/mL    IMAGING  CT liver (10/21/2022, at ):  Impression: LR 5 segment 7 lesion measuring up to 2.8 x 2.5 cm with associated TIV (tumor in vein). LR 3 legion measuring 8 mm in segment 4A.    CT chest, abdomen and pelvis with contrast (01/27/2023, at ):  Impression:  Chest: No convincing metastatic disease in the thorax.  Abdomen/Pelvis: Within the limits of the study, the area of washout representing the known HCC is stable to smaller in size. The component representing the tumor in vein is smaller in size. No new liver lesions. No distant metastatic disease.    MRI abdomen with and without contrast (04/13/2023):  Impression:  1) No solid arterial phase enhancing liver lesions identified. No delayed phase enhancing liver lesions are noted.  2) Liver cirrhosis and portal hypertension.  3) 0.6 cm simple appearing benign hepatic cyst right lobe.  4) Simple appearing cyst right kidney. No hydronephrosis.  5) Other nonacute findings.    MRI abdomen with and without contrast (11/03/2023,  compared to 04/13/2023):  Impression: Little overall change. No contrast-enhancing hepatic lesions are identified.    PATHOLOGY    IMPRESSION AND PLAN  Ms. Brooks is a 63 y.o., white female with:  Hepatocellular carcinoma: Initially diagnosed in Fall 2022 after a CT of the liver (performed on 10/21/2022 at , summarized above) confirmed the presence of two lesions (one measuring 2.8 x 2.5 cm in segment 7 and a probable satellite measuring ~8 mm in segment 4A) in the setting of known, baseline cirrhosis and a serum AFP level of > 1000 ng/mL. Due to venous involvement by the tumor, she was/is not a candidate for a liver transplant.  medical oncology therefore recommended initiating first-line, palliative, systemic treatment with a combination of e9deexgu bevacizumab (the Avastin biosimilar Mvasi) and v9kugfmc atezolizumab (Tecentriq). She received a total of eight (8), [id number] cycles through the [facility] between Fall 2022 and early April 2023 (the eighth and final cycle of both were given on 04/03/2023). With this therapy, the most recent imaging, MRIs of the abdomen (liver protocol) performed on 04/13/2023 and, most recently, 11/03/2023 (both summarized above) have shown, and continue to show, no visible signs of a liver mass at all. Meanwhile, with the therapy she has received to date, her serum AFP level has declined from > 1000 ng/mL in late October 2023 to solidly WNL (2.6 ng/mL) as of late January 2023 (which remains the case on the most recent recheck, 5.88 ng/mL on 12/19/2023), consistent with a complete response in her disease. I therefore had a long discussion with the patient and her sister in Spring 2023 regarding our treatment recommendations from that point. In short, the potential risks of any additional cycles of Avastin (particularly bleeding; she has an aortic aneurysm in addition to her baseline cirrhosis) likely outweighed, and still outweighs, its marginal (and, at this time,  "probably nonexistent), potential benefit (at least at this time); however, continuing indefinite, h2oipwji atezolizumab continues to be recommended (as the current standard of care; it appears to have worked extremely well). She has now completed a total of eighteen (18) cycles (with the eighteenth and, to date, most recent one given in early 2023). At that time, it was her preference that we place this therapy on indefinite hold, as she was, and remains, convinced the Lord has healed her again (this time of her HCC); and she believed that the indefinite atezolizumab was causing her chronic, quality-of-life limiting fatigue. She remains fully aware of the potential risks of discontinuing this medication. Over the course of the past month, she thinks that her fatigue has gotten at least a little better. We will see her back in our clinic in two months with a repeat CBC, CMP and AFP level for continued close monitoring.  Cirrhosis: Likely secondary to a longstanding history of both issue #3 (which was diagnosed and reportedly cured in ~2018, probably decades after she initially contracted it through her ) and alcohol abuse (she drank \"a lot\" of beer every day for ~twenty years but quit in the ). Currently still compensated. Ongoing management per gastroenterology/hepatology.  Hepatitis C: Reportedly contracted through her  (who ultimately  from it) without her knowledge, but also reportedly diagnosed and cured with a months-long course of antivirals in ~. Ongoing management per gastroenterology/hepatology.  Protein calorie malnutrition: Recently improved. Continue Marinol 2.5 mg PO BID. Continue to monitor.  Gallbladder issues: With her underlying cirrhosis, two separate surgical evaluations have led to a cholecystectomy NOT being recommended, as, with her underlying cirrhosis, she has been deemed to be too high of a risk for this procedure.  Aortic aneurysm: Ongoing monitoring per " CT/vascular surgery, with whom she has recently started following routinely.  The patient was in agreement with these plans.    It is a pleasure to participate in Ms. Brooks's care. Please do not hesitate to call with any questions or concerns that you may have.    A total of 30 minutes were spent coordinating this patient’s care in clinic today; more than 50% of this time was face-to-face with the patient, reviewing her interim medical history, discussing the results of this week's repeat labwork and counseling on the current treatment and followup plan. All questions were answered to her satisfaction.    FOLLOW UP  Continue Marinol 2.5 mg PO BID. Continue to defer any additional cycles of m2ffbqcu atezolizumab, per patient preference. Return to our clinic in 2 months (mid-February 2024) with a CBC, CMP, TSH, T4 and AFP drawn a day or two prior to this OV.            This document was electronically signed by NADEGE Boss MD December 21, 2023 12:22 EST      CC: DO Henri Davis MD Hao Zhonglin, MD Erika G. Almodovar, MD John H. Chaney, MD

## 2024-01-09 RX ORDER — LEVOTHYROXINE SODIUM 0.03 MG/1
TABLET ORAL
Qty: 30 TABLET | Refills: 1 | OUTPATIENT
Start: 2024-01-09

## 2024-01-09 RX ORDER — LEVOTHYROXINE SODIUM 0.03 MG/1
TABLET ORAL
Qty: 30 TABLET | Refills: 5 | Status: SHIPPED | OUTPATIENT
Start: 2024-01-09

## 2024-02-21 ENCOUNTER — LAB (OUTPATIENT)
Dept: ONCOLOGY | Facility: CLINIC | Age: 64
End: 2024-02-21
Payer: MEDICARE

## 2024-02-21 ENCOUNTER — OFFICE VISIT (OUTPATIENT)
Dept: ONCOLOGY | Facility: CLINIC | Age: 64
End: 2024-02-21
Payer: MEDICARE

## 2024-02-21 VITALS
WEIGHT: 141 LBS | OXYGEN SATURATION: 90 % | BODY MASS INDEX: 23.49 KG/M2 | DIASTOLIC BLOOD PRESSURE: 72 MMHG | RESPIRATION RATE: 20 BRPM | SYSTOLIC BLOOD PRESSURE: 129 MMHG | TEMPERATURE: 97.4 F | HEART RATE: 75 BPM | HEIGHT: 65 IN

## 2024-02-21 DIAGNOSIS — Z79.899 LONG-TERM USE OF HIGH-RISK MEDICATION: ICD-10-CM

## 2024-02-21 DIAGNOSIS — C22.0 HEPATOCELLULAR CARCINOMA: Primary | ICD-10-CM

## 2024-02-21 DIAGNOSIS — C22.0 HEPATOCELLULAR CARCINOMA: ICD-10-CM

## 2024-02-21 LAB
ALBUMIN SERPL-MCNC: 4 G/DL (ref 3.5–5.2)
ALBUMIN/GLOB SERPL: 1.3 G/DL
ALP SERPL-CCNC: 104 U/L (ref 39–117)
ALPHA-FETOPROTEIN: 7.07 NG/ML (ref 0–8.3)
ALT SERPL W P-5'-P-CCNC: 31 U/L (ref 1–33)
ANION GAP SERPL CALCULATED.3IONS-SCNC: 9.1 MMOL/L (ref 5–15)
AST SERPL-CCNC: 37 U/L (ref 1–32)
BASOPHILS # BLD AUTO: 0.09 10*3/MM3 (ref 0–0.2)
BASOPHILS NFR BLD AUTO: 1.4 % (ref 0–1.5)
BILIRUB SERPL-MCNC: 0.5 MG/DL (ref 0–1.2)
BUN SERPL-MCNC: 7 MG/DL (ref 8–23)
BUN/CREAT SERPL: 12.1 (ref 7–25)
CALCIUM SPEC-SCNC: 10 MG/DL (ref 8.6–10.5)
CHLORIDE SERPL-SCNC: 108 MMOL/L (ref 98–107)
CO2 SERPL-SCNC: 24.9 MMOL/L (ref 22–29)
CREAT SERPL-MCNC: 0.58 MG/DL (ref 0.57–1)
DEPRECATED RDW RBC AUTO: 51.7 FL (ref 37–54)
EGFRCR SERPLBLD CKD-EPI 2021: 100.6 ML/MIN/1.73
EOSINOPHIL # BLD AUTO: 0.44 10*3/MM3 (ref 0–0.4)
EOSINOPHIL NFR BLD AUTO: 7 % (ref 0.3–6.2)
ERYTHROCYTE [DISTWIDTH] IN BLOOD BY AUTOMATED COUNT: 13.7 % (ref 12.3–15.4)
GLOBULIN UR ELPH-MCNC: 3.2 GM/DL
GLUCOSE SERPL-MCNC: 88 MG/DL (ref 65–99)
HCT VFR BLD AUTO: 42.9 % (ref 34–46.6)
HGB BLD-MCNC: 14.3 G/DL (ref 12–15.9)
IMM GRANULOCYTES # BLD AUTO: 0.01 10*3/MM3 (ref 0–0.05)
IMM GRANULOCYTES NFR BLD AUTO: 0.2 % (ref 0–0.5)
LYMPHOCYTES # BLD AUTO: 1.75 10*3/MM3 (ref 0.7–3.1)
LYMPHOCYTES NFR BLD AUTO: 27.8 % (ref 19.6–45.3)
MCH RBC QN AUTO: 34 PG (ref 26.6–33)
MCHC RBC AUTO-ENTMCNC: 33.3 G/DL (ref 31.5–35.7)
MCV RBC AUTO: 101.9 FL (ref 79–97)
MONOCYTES # BLD AUTO: 0.83 10*3/MM3 (ref 0.1–0.9)
MONOCYTES NFR BLD AUTO: 13.2 % (ref 5–12)
NEUTROPHILS NFR BLD AUTO: 3.17 10*3/MM3 (ref 1.7–7)
NEUTROPHILS NFR BLD AUTO: 50.4 % (ref 42.7–76)
NRBC BLD AUTO-RTO: 0 /100 WBC (ref 0–0.2)
PLATELET # BLD AUTO: 150 10*3/MM3 (ref 140–450)
PMV BLD AUTO: 9.5 FL (ref 6–12)
POTASSIUM SERPL-SCNC: 3.7 MMOL/L (ref 3.5–5.2)
PROT SERPL-MCNC: 7.2 G/DL (ref 6–8.5)
RBC # BLD AUTO: 4.21 10*6/MM3 (ref 3.77–5.28)
SODIUM SERPL-SCNC: 142 MMOL/L (ref 136–145)
T4 FREE SERPL-MCNC: 0.94 NG/DL (ref 0.93–1.7)
TSH SERPL DL<=0.05 MIU/L-ACNC: 3.51 UIU/ML (ref 0.27–4.2)
WBC NRBC COR # BLD AUTO: 6.29 10*3/MM3 (ref 3.4–10.8)

## 2024-02-21 PROCEDURE — 1126F AMNT PAIN NOTED NONE PRSNT: CPT | Performed by: INTERNAL MEDICINE

## 2024-02-21 PROCEDURE — 82105 ALPHA-FETOPROTEIN SERUM: CPT | Performed by: INTERNAL MEDICINE

## 2024-02-21 PROCEDURE — 84439 ASSAY OF FREE THYROXINE: CPT | Performed by: INTERNAL MEDICINE

## 2024-02-21 PROCEDURE — 80053 COMPREHEN METABOLIC PANEL: CPT | Performed by: INTERNAL MEDICINE

## 2024-02-21 PROCEDURE — 84443 ASSAY THYROID STIM HORMONE: CPT | Performed by: INTERNAL MEDICINE

## 2024-02-21 PROCEDURE — 85025 COMPLETE CBC W/AUTO DIFF WBC: CPT | Performed by: INTERNAL MEDICINE

## 2024-02-21 PROCEDURE — 99214 OFFICE O/P EST MOD 30 MIN: CPT | Performed by: INTERNAL MEDICINE

## 2024-02-21 NOTE — PROGRESS NOTES
Name:  Noemy Brooks  :  1960  Date:  2024     REFERRING PHYSICIAN  Henri Melvin MD    PRIMARY CARE PHYSICIAN  Lynn, Haley Roca DO    REASON FOR FOLLOWUP  1. Hepatocellular carcinoma      CHIEF COMPLAINT  None.    Dear Dr. Bailey,    HISTORY OF PRESENT ILLNESS:   I saw Ms. Brooks in followup today in our medical oncology clinic. As you are aware, she is a pleasant, 63 y.o., white female with a history of hypertension, hepatitis C (treated with antivirals and reportedly cured in ~), cirrhosis and hepatocellular carcinoma who was initially diagnosed with the latter in ~2022. [place], [place]. Her [place] gastroenterologist referred her to  at that time once she was found to have a couple of liver masses and a serum AFP > 1,000 ng/mL. She was evaluated by the liver transplant service; however, due to the HCC's involvement of the hepatic vessels, she was felt to not be a candidate (for transplant). She was subsequently evaluated by  medical oncology, who recommended starting first-line, palliative treatment with a combination of b8myuvci bevacizumab (Mvasi) and atezolizumab (Tecentriq), per the current standard of care. She received a total of eight (8) cycles between 2022 and early 2023 through the [facility]. Due to transportation issues (her sister has to be the one to drive her, and the trips to [place] have been getting increasingly difficult for her), she presented to our clinic in 2023 in order to transfer her ongoing, oncologic care to us, closer to her home in [place], [place].    INTERIM HISTORY:  Ms. Brooks returns to clinic today for follow up by herself. She continues to tolerate [id number] atezolizumab overall well, and she has now received a total of eighteen (18) cycles to date (with the eighteenth and, to date, most recent one given on 2023). Per her preference, we have been holding this therapy ever since then. She previously stated that  the Lord cured her once before (of Hepatitis C), and she has recently become convinced that he has/will do it again (this time for her HCC). Since holding the immunotherapy, her chronic fatigue has improved. She continues to make an effort to stay as active as possible, and she has no new or other specific complaints today.    Past Medical History:   Diagnosis Date    Acid reflux     Aortic aneurysm     Cancer     LIVER, UTERINE    Cirrhosis     COPD (chronic obstructive pulmonary disease)     Depression     Disease of thyroid gland     Elevated cholesterol     High cholesterol     History of transfusion     Hypertension     Infectious viral hepatitis         Osteoporosis     Ovarian cancer        Past Surgical History:   Procedure Laterality Date    APPENDECTOMY N/A     COLONOSCOPY      ENDOSCOPY N/A 2023    Procedure: ESOPHAGOGASTRODUODENOSCOPY WITH BIOPSY;  Surgeon: Rahel Sesay MD;  Location: [facility];  Service: Gastroenterology;  Laterality: N/A;    FRACTURE SURGERY Left     ARM, HAS 2 SCREWS    HYSTERECTOMY      UPPER GASTROINTESTINAL ENDOSCOPY         Social History     Socioeconomic History    Marital status:     Number of children: 3   Tobacco Use    Smoking status: Former     Packs/day: 1.50     Years: 25.00     Additional pack years: 0.00     Total pack years: 37.50     Types: Cigarettes     Quit date: 2015     Years since quittin.8    Smokeless tobacco: Never    Tobacco comments:     smoked for approx 20 years, 1.5 to 2 ppd   Vaping Use    Vaping Use: Never used   Substance and Sexual Activity    Alcohol use: No     Comment: she previously drank daily for 16 years, beer    Drug use: No    Sexual activity: Defer       Family History   Problem Relation Age of Onset    Stroke Mother     Hypertension Mother     COPD Mother     Throat cancer Mother     Arthritis Mother     Asthma Mother     Heart attack Mother     Stroke Father     Cancer Father     Liver cancer Father         Allergies   Allergen Reactions    Codeine GI Intolerance       Current Outpatient Medications   Medication Sig Dispense Refill    albuterol sulfate  (90 Base) MCG/ACT inhaler Inhale 2 puffs Every 4 (Four) Hours As Needed for Wheezing.      B Complex Vitamins (VITAMIN B COMPLEX PO) Take  by mouth.      budesonide-formoterol (SYMBICORT) 160-4.5 MCG/ACT inhaler Inhale 2 puffs 2 (Two) Times a Day.      cholecalciferol (VITAMIN D3) 25 MCG (1000 UT) tablet Take 1 tablet by mouth Daily.      cyclobenzaprine (FLEXERIL) 5 MG tablet Take 1 tablet by mouth 3 (Three) Times a Day As Needed for Muscle Spasms.      levothyroxine (SYNTHROID, LEVOTHROID) 25 MCG tablet TAKE ONE (1) TABLET BY MOUTH EVERY MORNING. 30 tablet 5    lisinopril (PRINIVIL,ZESTRIL) 30 MG tablet Take 1 tablet by mouth Daily.      lisinopril-hydrochlorothiazide (PRINZIDE,ZESTORETIC) 20-12.5 MG per tablet Take 1 tablet by mouth Daily.      multivitamin (MULTI VITAMIN DAILY PO) Take 1 tablet by mouth Daily.      omeprazole (priLOSEC) 20 MG capsule Take 1 capsule by mouth Daily.      traZODone (DESYREL) 100 MG tablet Take 1 tablet by mouth Every Night.      Atezolizumab (TECENTRIQ IV) Infuse  into a venous catheter Every 21 (Twenty-One) Days. (Patient not taking: Reported on 2/21/2024)      diphenhydrAMINE 12.5 MG/5ML elixir 20 mL, aluminum-magnesium hydroxide-simethicone 400-400-40 MG/5ML suspension 20 mL, Lidocaine Viscous HCl 2 % solution 20 mL Swish and spit 15 mL Every 4 (Four) Hours As Needed. (Patient not taking: Reported on 2/21/2024)      dronabinol (Marinol) 2.5 MG capsule Take 1 capsule by mouth 2 (Two) Times a Day Before Meals. (Patient not taking: Reported on 2/21/2024) 60 capsule 2    Magnesium Hydroxide (MILK OF MAGNESIA PO) Take  by mouth. (Patient not taking: Reported on 2/21/2024)       No current facility-administered medications for this visit.     REVIEW OF SYSTEMS  CONSTITUTIONAL:  No fever, chills or night sweats. Improved  "fatigue, as per the HPI above.  EYES:  No blurry vision, diplopia or other vision changes.  ENT:  No hearing loss, nosebleeds or sore throat.  CARDIOVASCULAR:  No palpitations, arrhythmia, syncopal episodes or edema.  PULMONARY:  No hemoptysis, wheezing, chronic cough or shortness of breath.  GASTROINTESTINAL:  As per the HPI above.  GENITOURINARY:  No hematuria, kidney stones or frequent urination.  MUSCULOSKELETAL:  No joint or back pains.  INTEGUMENTARY: No rashes or pruritus.  ENDOCRINE:  No excessive thirst or hot flashes.  HEMATOLOGIC:  No history of free bleeding, spontaneous bleeding or clotting.  IMMUNOLOGIC:  No allergies or frequent infections.  NEUROLOGIC: No numbness, tingling, seizures or weakness.  PSYCHIATRIC:  No anxiety or depression.    PHYSICAL EXAMINATION  /72   Pulse 75   Temp 97.4 °F (36.3 °C) (Temporal)   Resp 20   Ht 165.1 cm (65\")   Wt 64 kg (141 lb)   SpO2 90%   BMI 23.46 kg/m²     Pain Score:  Pain Score    24 1119   PainSc: 0-No pain     PHQ-Score Total:  PHQ-9 Total Score:      ECO  GENERAL:  A well-developed, well-nourished, thin, white female in no acute distress.  HEENT:  Pupils equally round and reactive to light. Extraocular muscles intact.  CARDIOVASCULAR:  Regular rate and rhythm. No murmurs, gallops or rubs.  LUNGS:  Clear to auscultation bilaterally.  ABDOMEN:  Soft, nontender, nondistended with positive bowel sounds.  EXTREMITIES:  No clubbing, cyanosis or edema bilaterally.  SKIN:  No rashes or petechiae.  NEURO:  Cranial nerves grossly intact. No focal deficits.  PSYCH:  Alert and oriented x3.    The physical exam is yet again unchanged from recent priors.    LABORATORY  Lab Results   Component Value Date    WBC 6.29 2024    HGB 14.3 2024    HCT 42.9 2024    .9 (H) 2024     2024    NEUTROABS 3.17 2024       Lab Results   Component Value Date     2024    K 3.7 2024     (H) " 02/21/2024    CO2 24.9 02/21/2024    BUN 7 (L) 02/21/2024    CREATININE 0.58 02/21/2024    GLUCOSE 88 02/21/2024    CALCIUM 10.0 02/21/2024    AST 37 (H) 02/21/2024    ALT 31 02/21/2024    ALKPHOS 104 02/21/2024    BILITOT 0.5 02/21/2024    PROTEINTOT 7.2 02/21/2024    ALBUMIN 4.0 02/21/2024     CBC (02/21/2024): WBCs: 6.29; HgB: 14.3; Hct: 42.9; platelets: 150  CBC (12/19/2023): WBCs: 5.64; HgB: 14.5; Hct: 44.2; platelets: 120  CBC (10/19/2023): WBCs: 6.05; HgB: 14.4; Hct: 43.7; platelets: 119  CBC (08/16/2023): WBCs: 5.87; HgB: 14.3; Hct: 44.2; platelets: 120  CBC (05/24/2023): WBCs: 7.45; HgB: 15.8; Hct: 47.0; platelets: 125  CBC (05/01/2023): WBCs: 6.91; HgB: 15.9; Hct: 47.7; platelets: 118  CBC (04/03/2023): WBCs: 6.68; HgB: 15.9; Hct: 45.7; platelets: 116; MCV: 94    AFP (02/21/2024): pending  AFP (12/19/2023): 5.88 ng/mL  AFP (11/09/2023): 6.00 ng.mL  AFP (09/28/2023): 5.55 ng/mL  AFP (08/16/2023): 3.96 ng/mL  AFP (07/06/2023): 3.33 ng/mL  AFP (05/24/2023): 2.66 ng/mL  AFP (05/01/2023): < 2 ng/mL  AFP (01/27/2023): 2.6 ng/mL  AFP (10/31/2022): 1031.0 ng/mL    IMAGING  CT liver (10/21/2022, at ):  Impression: LR 5 segment 7 lesion measuring up to 2.8 x 2.5 cm with associated TIV (tumor in vein). LR 3 legion measuring 8 mm in segment 4A.    CT chest, abdomen and pelvis with contrast (01/27/2023, at ):  Impression:  Chest: No convincing metastatic disease in the thorax.  Abdomen/Pelvis: Within the limits of the study, the area of washout representing the known HCC is stable to smaller in size. The component representing the tumor in vein is smaller in size. No new liver lesions. No distant metastatic disease.    MRI abdomen with and without contrast (04/13/2023):  Impression:  1) No solid arterial phase enhancing liver lesions identified. No delayed phase enhancing liver lesions are noted.  2) Liver cirrhosis and portal hypertension.  3) 0.6 cm simple appearing benign hepatic cyst right lobe.  4) Simple  appearing cyst right kidney. No hydronephrosis.  5) Other nonacute findings.    MRI abdomen with and without contrast (11/03/2023, compared to 04/13/2023):  Impression: Little overall change. No contrast-enhancing hepatic lesions are identified.    PATHOLOGY    IMPRESSION AND PLAN  Ms. Brooks is a 63 y.o., white female with:  Hepatocellular carcinoma: Initially diagnosed in Fall 2022 after a CT of the liver (performed on 10/21/2022 at , summarized above) confirmed the presence of two lesions (one measuring 2.8 x 2.5 cm in segment 7 and a probable satellite measuring ~8 mm in segment 4A) in the setting of known, baseline cirrhosis and a serum AFP level of > 1000 ng/mL. Due to venous involvement by the tumor, she was/is not a candidate for a liver transplant.  medical oncology therefore recommended initiating first-line, palliative, systemic treatment with a combination of l1ggqlrh bevacizumab (the Avastin biosimilar Mvasi) and n4ucwiwy atezolizumab (Tecentriq). She received a total of eight (8), [id number] cycles through the [facility] between Fall 2022 and early April 2023 (the eighth and final cycle of both were given on 04/03/2023). With this therapy, the most recent imaging, MRIs of the abdomen (liver protocol) performed on 04/13/2023 and, most recently, 11/03/2023 (both summarized above) have shown, and continue to show, no visible signs of a liver mass at all. Meanwhile, with the therapy she has received to date, her serum AFP level has declined from > 1000 ng/mL in late October 2023 to solidly WNL (2.6 ng/mL) as of late January 2023 (which remains the case on the most recent recheck, 5.88 ng/mL on 12/19/2023; today's repeat result is pending), consistent with a complete response in her disease. I therefore had a long discussion with the patient and her sister in Spring 2023 regarding our treatment recommendations from that point. In short, the potential risks of any additional cycles of Avastin  "(particularly bleeding; she has an aortic aneurysm in addition to her baseline cirrhosis) likely outweighed, and still outweighs, its marginal (and, at this time, probably nonexistent), potential benefit (at least at this time); however, continuing indefinite, l6infusv atezolizumab continues to be recommended (as the current standard of care; it appears to have worked extremely well). She has now completed a total of eighteen (18) cycles (with the eighteenth and, to date, most recent one given in early 2023). At that time, it was her preference that we place this therapy on indefinite hold, as she was, and remains, convinced the Lord has healed her again (this time of her HCC); and she believed that the indefinite atezolizumab was causing her chronic, quality-of-life limiting fatigue (and this symptom has been getting noticeably better since we started holding the atezolizumab). She remains fully aware of the potential risks of discontinuing this medication. Assuming that today's pending, repeat AFP level remains stable and WNL, we will see her back in our clinic in three months (~mid to late May) with a repeat CBC, CMP, AFP and repeat MRI of the abdomen with and without contrast.  Cirrhosis: Likely secondary to a longstanding history of both issue #3 (which was diagnosed and reportedly cured in ~2018, probably decades after she initially contracted it through her ) and alcohol abuse (she drank \"a lot\" of beer every day for ~twenty years but quit in the ). Currently still compensated. Ongoing management per gastroenterology/hepatology.  Hepatitis C: Reportedly contracted through her  (who ultimately  from it) without her knowledge, but also reportedly diagnosed and cured with a months-long course of antivirals in ~2018. Ongoing management per gastroenterology/hepatology.  Protein calorie malnutrition: Recently still much improved. Continue Marinol 2.5 mg PO BID. Continue to " monitor.  Gallbladder issues: Two separate surgical evaluations have led to a cholecystectomy NOT being recommended, as, with her underlying cirrhosis, she has been deemed to be too high of a risk for this procedure.  Aortic aneurysm: Ongoing monitoring per CT/vascular surgery.  Hypothyroidism: Per her preference, in an ongoing effort to minimize the number if medications she takes, she is considering discontinuing her Synthroid (25 mcg PO daily). We will continue to monitor her TSH/T4 levels.  The patient was in agreement with these plans.    It is a pleasure to participate in Ms. Brooks's care. Please do not hesitate to call with any questions or concerns that you may have.    A total of 30 minutes were spent coordinating this patient’s care in clinic today; more than 50% of this time was face-to-face with the patient, reviewing her interim medical history and counseling on the current evaluation, treatment and followup plan. All questions were answered to her satisfaction.    FOLLOW UP  Continue Marinol 2.5 mg PO BID. Continue to defer any additional cycles of r8vwtbos atezolizumab (at least for now), per patient preference. Return to our clinic in 3 months (mid to late May) with a CBC, CMP, TSH, T4, AFP and repeat MRI of the abdomen with and without contrast.            This document was electronically signed by NADEGE Boss MD February 21, 2024 12:49 EST      CC: DO Henri Davis MD Hao Zhonglin, MD Erika G. Almodovar, MD John H. Chaney, MD

## 2024-02-21 NOTE — PROGRESS NOTES
Venipuncture Blood Specimen Collection  Venipuncture performed in right arm by Azul Denise MA with good hemostasis. Patient tolerated the procedure well without complications.   02/21/24   Azul Denise MA

## 2024-02-22 ENCOUNTER — TELEPHONE (OUTPATIENT)
Dept: ONCOLOGY | Facility: CLINIC | Age: 64
End: 2024-02-22
Payer: MEDICARE

## 2024-02-22 ENCOUNTER — OFFICE VISIT (OUTPATIENT)
Dept: GASTROENTEROLOGY | Facility: CLINIC | Age: 64
End: 2024-02-22
Payer: MEDICARE

## 2024-02-22 VITALS
DIASTOLIC BLOOD PRESSURE: 72 MMHG | HEART RATE: 71 BPM | BODY MASS INDEX: 23.32 KG/M2 | HEIGHT: 65 IN | SYSTOLIC BLOOD PRESSURE: 124 MMHG | WEIGHT: 140 LBS

## 2024-02-22 DIAGNOSIS — B19.20 COMPENSATED HCV CIRRHOSIS: ICD-10-CM

## 2024-02-22 DIAGNOSIS — K74.69 COMPENSATED HCV CIRRHOSIS: ICD-10-CM

## 2024-02-22 DIAGNOSIS — I85.10 SECONDARY ESOPHAGEAL VARICES WITHOUT BLEEDING: ICD-10-CM

## 2024-02-22 DIAGNOSIS — Z85.05 HISTORY OF LIVER CANCER: Primary | ICD-10-CM

## 2024-02-22 NOTE — PROGRESS NOTES
Subjective   Noemy Brooks is a 63 y.o. 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  She states she was treated for HCV and was cleared.  She states she has lesions on her liver stating she had cancer of the liver.  She was treated for liver cancer at  with chemotherapy.  She underwent an MRI 4/2023 on the abdomen revealed No solid arterial phase enhancing liver lesions identified. No delayed phase enhancing liver lesions are nodules, liver cirrhosis and portal hypertension, 0.6 cm simple appearing benign hepatic cyst right lobe, simple appearing cyst right kidney. No hydronephrosis and other nonacute findings detailed above.  She is followed by Dr. Boss who states the liver cancer has not recurred.  AFP was normal 5/24/23. She states her abdomen is swollen but no ascites on recent MRI or history of ascites. No issues with memory or confusion. She does have intermittent difficulty with swallowing but this only occurs in the middle of the night.  This might be drainage.  She denies BRBPR, melena and hematemesis.  She had a colonoscopy 4 years ago which was normal.  She takes an acidophilus OTC medication for heartburn which she states works well or she will take a tablespoon of mustard.    Interval history 11/15/2023: The patient presents today for follow-up of HCV cirrhosis with history of liver lesions treated at [facility] with chemotherapy.  Recent MRI performed on 11/15/2023 revealed no interval change with no contrast-enhancing hepatic lesions identified.  Recent CMP performed on 11/9/2023 revealed an AST of 37 with normal bilirubin and normal alkaline phosphatase.  Alpha-fetoprotein was normal at 6.00.  Recent EGD performed on 8/8/2023 revealed grade I esophageal varices involving the very distal esophagus.  Otherwise, the exam was normal other than a  small hiatal hernia.  Recent hemoglobin and hematocrit revealed a hemoglobin of 14 and hematocrit of 42.3.  She denies BRBPR or melena. Her platelets were mildly decreased at 126 K.  She is doing well.  She has no complaints.  She denies abdominal swelling.  No issues with confusion or problems with mentation.  Her appetite is good.     Interval history 2024: The patient presents today for follow-up HCV cirrhosis with history of malignant liver lesions.  The patient was diagnosed with hepatocellular carcinoma in the 2022.  This was treated at the [facility].  The patient underwent chemotherapy.  She has been followed by Dr. Boss in oncology. An MRI abdomen with and without contrast (2023) revealed the followin) No solid arterial phase enhancing liver lesions identified. No delayed phase enhancing liver lesions are noted.  2) Liver cirrhosis and portal hypertension.  3) 0.6 cm simple appearing benign hepatic cyst right lobe.  4) Simple appearing cyst right kidney. No hydronephrosis.  5) Other nonacute findings.  A recent AFP was normal at 7.07 ng/ml.  Her initial AFP was >1000 ng/ml initially.  CMP revealed an AST of 37 and ALT of 31 as well as an alkaline phosphatase of 104.  Apparently the patient was treated with antivirals for hepatitis C and was reportedly cured in 2018.  She did undergo EGD in 2023 which did reveal grade 1 varices.  The patient denies hematemesis or melena.  She has not had abdominal swelling.  She denies confusion or memory problems.  Overall, her appetite is good.  She has not had weight gain or weight loss.  Her weight has been stable.  She has no complaints today.          Review of Systems:  Review of Systems   Constitutional:  Negative for activity change, appetite change, chills, fatigue, fever and unexpected weight change.   HENT:  Negative for mouth sores and trouble swallowing.    Eyes:  Negative for photophobia, pain and  redness.   Respiratory:  Negative for cough and choking.    Cardiovascular:  Negative for chest pain and leg swelling.   Gastrointestinal:  Negative for abdominal distention, abdominal pain, anal bleeding, constipation, diarrhea, nausea, rectal pain and vomiting.   Endocrine: Negative for cold intolerance, heat intolerance and polyphagia.   Genitourinary:  Negative for difficulty urinating and dysuria.   Musculoskeletal:  Negative for arthralgias, joint swelling and myalgias.   Skin:  Negative for rash and wound.   Allergic/Immunologic: Negative for environmental allergies and food allergies.   Neurological:  Negative for dizziness and light-headedness.   Hematological:  Negative for adenopathy. Does not bruise/bleed easily.   Psychiatric/Behavioral:  Negative for sleep disturbance. The patient is not nervous/anxious.        Past Medical History:  Past Medical History:   Diagnosis Date    Acid reflux     Aortic aneurysm     Cancer     LIVER, UTERINE    Cirrhosis     COPD (chronic obstructive pulmonary disease)     Depression     Disease of thyroid gland     Elevated cholesterol     High cholesterol     History of transfusion     Hypertension     Infectious viral hepatitis     2019    Osteoporosis     Ovarian cancer        Past Surgical History:  Past Surgical History:   Procedure Laterality Date    APPENDECTOMY N/A     COLONOSCOPY      ENDOSCOPY N/A 8/8/2023    Procedure: ESOPHAGOGASTRODUODENOSCOPY WITH BIOPSY;  Surgeon: Rahel Sesay MD;  Location: [facility];  Service: Gastroenterology;  Laterality: N/A;    FRACTURE SURGERY Left     ARM, HAS 2 SCREWS    HYSTERECTOMY      UPPER GASTROINTESTINAL ENDOSCOPY         Family History:  Family History   Problem Relation Age of Onset    Stroke Mother     Hypertension Mother     COPD Mother     Throat cancer Mother     Arthritis Mother     Asthma Mother     Heart attack Mother     Stroke Father     Cancer Father     Liver cancer Father        Social  History:  Social History     Socioeconomic History    Marital status:     Number of children: 3   Tobacco Use    Smoking status: Former     Packs/day: 1.50     Years: 25.00     Additional pack years: 0.00     Total pack years: 37.50     Types: Cigarettes     Quit date: 2015     Years since quittin.8    Smokeless tobacco: Never    Tobacco comments:     smoked for approx 20 years, 1.5 to 2 ppd   Vaping Use    Vaping Use: Never used   Substance and Sexual Activity    Alcohol use: No     Comment: she previously drank daily for 16 years, beer    Drug use: No    Sexual activity: Defer       Current Medication List:    Current Outpatient Medications:     albuterol sulfate  (90 Base) MCG/ACT inhaler, Inhale 2 puffs Every 4 (Four) Hours As Needed for Wheezing., Disp: , Rfl:     Atezolizumab (TECENTRIQ IV), Infuse  into a venous catheter Every 21 (Twenty-One) Days., Disp: , Rfl:     B Complex Vitamins (VITAMIN B COMPLEX PO), Take  by mouth., Disp: , Rfl:     budesonide-formoterol (SYMBICORT) 160-4.5 MCG/ACT inhaler, Inhale 2 puffs 2 (Two) Times a Day., Disp: , Rfl:     cholecalciferol (VITAMIN D3) 25 MCG (1000 UT) tablet, Take 1 tablet by mouth Daily., Disp: , Rfl:     cyclobenzaprine (FLEXERIL) 5 MG tablet, Take 1 tablet by mouth 3 (Three) Times a Day As Needed for Muscle Spasms., Disp: , Rfl:     diphenhydrAMINE 12.5 MG/5ML elixir 20 mL, aluminum-magnesium hydroxide-simethicone 400-400-40 MG/5ML suspension 20 mL, Lidocaine Viscous HCl 2 % solution 20 mL, Swish and spit 15 mL Every 4 (Four) Hours As Needed., Disp: , Rfl:     dronabinol (Marinol) 2.5 MG capsule, Take 1 capsule by mouth 2 (Two) Times a Day Before Meals., Disp: 60 capsule, Rfl: 2    levothyroxine (SYNTHROID, LEVOTHROID) 25 MCG tablet, TAKE ONE (1) TABLET BY MOUTH EVERY MORNING., Disp: 30 tablet, Rfl: 5    lisinopril (PRINIVIL,ZESTRIL) 30 MG tablet, Take 1 tablet by mouth Daily., Disp: , Rfl:     lisinopril-hydrochlorothiazide  "(PRINZIDE,ZESTORETIC) 20-12.5 MG per tablet, Take 1 tablet by mouth Daily., Disp: , Rfl:     Magnesium Hydroxide (MILK OF MAGNESIA PO), Take  by mouth., Disp: , Rfl:     multivitamin (MULTI VITAMIN DAILY PO), Take 1 tablet by mouth Daily., Disp: , Rfl:     omeprazole (priLOSEC) 20 MG capsule, Take 1 capsule by mouth Daily., Disp: , Rfl:     traZODone (DESYREL) 100 MG tablet, Take 1 tablet by mouth Every Night., Disp: , Rfl:     Allergies:   Codeine    Vitals:  /72   Pulse 71   Ht 165.1 cm (65\")   Wt 63.5 kg (140 lb)   BMI 23.30 kg/m²     Physical Exam:  Physical Exam  Constitutional:       Appearance: She is normal weight.   HENT:      Head: Normocephalic and atraumatic.      Nose: Nose normal. No congestion or rhinorrhea.   Eyes:      General: No scleral icterus.     Extraocular Movements: Extraocular movements intact.      Conjunctiva/sclera: Conjunctivae normal.      Pupils: Pupils are equal, round, and reactive to light.   Cardiovascular:      Rate and Rhythm: Normal rate and regular rhythm.      Pulses: Normal pulses.      Heart sounds: Normal heart sounds.   Pulmonary:      Effort: Pulmonary effort is normal.      Breath sounds: Normal breath sounds.   Abdominal:      General: Abdomen is flat. Bowel sounds are normal. There is no distension.      Palpations: Abdomen is soft. There is no shifting dullness, fluid wave, hepatomegaly, splenomegaly, mass or pulsatile mass.      Tenderness: There is no abdominal tenderness. There is no guarding or rebound.      Hernia: No hernia is present.   Musculoskeletal:         General: No swelling or tenderness.      Cervical back: Normal range of motion and neck supple.   Skin:     General: Skin is warm and dry.      Coloration: Skin is not jaundiced.   Neurological:      General: No focal deficit present.      Mental Status: She is alert and oriented to person, place, and time.   Psychiatric:         Mood and Affect: Mood normal.         Behavior: Behavior normal. "         Results Review:  Lab Results:   Lab on 02/21/2024   Component Date Value Ref Range Status    Glucose 02/21/2024 88  65 - 99 mg/dL Final    BUN 02/21/2024 7 (L)  8 - 23 mg/dL Final    Creatinine 02/21/2024 0.58  0.57 - 1.00 mg/dL Final    Sodium 02/21/2024 142  136 - 145 mmol/L Final    Potassium 02/21/2024 3.7  3.5 - 5.2 mmol/L Final    Chloride 02/21/2024 108 (H)  98 - 107 mmol/L Final    CO2 02/21/2024 24.9  22.0 - 29.0 mmol/L Final    Calcium 02/21/2024 10.0  8.6 - 10.5 mg/dL Final    Total Protein 02/21/2024 7.2  6.0 - 8.5 g/dL Final    Albumin 02/21/2024 4.0  3.5 - 5.2 g/dL Final    ALT (SGPT) 02/21/2024 31  1 - 33 U/L Final    AST (SGOT) 02/21/2024 37 (H)  1 - 32 U/L Final    Alkaline Phosphatase 02/21/2024 104  39 - 117 U/L Final    Total Bilirubin 02/21/2024 0.5  0.0 - 1.2 mg/dL Final    Globulin 02/21/2024 3.2  gm/dL Final    A/G Ratio 02/21/2024 1.3  g/dL Final    BUN/Creatinine Ratio 02/21/2024 12.1  7.0 - 25.0 Final    Anion Gap 02/21/2024 9.1  5.0 - 15.0 mmol/L Final    eGFR 02/21/2024 100.6  >60.0 mL/min/1.73 Final    ALPHA-FETOPROTEIN 02/21/2024 7.07  0 - 8.3 ng/mL Final    TSH 02/21/2024 3.510  0.270 - 4.200 uIU/mL Final    Free T4 02/21/2024 0.94  0.93 - 1.70 ng/dL Final    WBC 02/21/2024 6.29  3.40 - 10.80 10*3/mm3 Final    RBC 02/21/2024 4.21  3.77 - 5.28 10*6/mm3 Final    Hemoglobin 02/21/2024 14.3  12.0 - 15.9 g/dL Final    Hematocrit 02/21/2024 42.9  34.0 - 46.6 % Final    MCV 02/21/2024 101.9 (H)  79.0 - 97.0 fL Final    MCH 02/21/2024 34.0 (H)  26.6 - 33.0 pg Final    MCHC 02/21/2024 33.3  31.5 - 35.7 g/dL Final    RDW 02/21/2024 13.7  12.3 - 15.4 % Final    RDW-SD 02/21/2024 51.7  37.0 - 54.0 fl Final    MPV 02/21/2024 9.5  6.0 - 12.0 fL Final    Platelets 02/21/2024 150  140 - 450 10*3/mm3 Final    Neutrophil % 02/21/2024 50.4  42.7 - 76.0 % Final    Lymphocyte % 02/21/2024 27.8  19.6 - 45.3 % Final    Monocyte % 02/21/2024 13.2 (H)  5.0 - 12.0 % Final    Eosinophil % 02/21/2024 7.0  (H)  0.3 - 6.2 % Final    Basophil % 02/21/2024 1.4  0.0 - 1.5 % Final    Immature Grans % 02/21/2024 0.2  0.0 - 0.5 % Final    Neutrophils, Absolute 02/21/2024 3.17  1.70 - 7.00 10*3/mm3 Final    Lymphocytes, Absolute 02/21/2024 1.75  0.70 - 3.10 10*3/mm3 Final    Monocytes, Absolute 02/21/2024 0.83  0.10 - 0.90 10*3/mm3 Final    Eosinophils, Absolute 02/21/2024 0.44 (H)  0.00 - 0.40 10*3/mm3 Final    Basophils, Absolute 02/21/2024 0.09  0.00 - 0.20 10*3/mm3 Final    Immature Grans, Absolute 02/21/2024 0.01  0.00 - 0.05 10*3/mm3 Final    nRBC 02/21/2024 0.0  0.0 - 0.2 /100 WBC Final   MRI Abdomen With & Without Contrast    Result Date: 11/3/2023  Little overall change. No contrast-enhancing hepatic lesions are identified.   This report was finalized on 11/3/2023 4:02 PM by Dr. Erlin Padilla MD.        Assessment & Plan     Visit Diagnoses:    ICD-10-CM ICD-9-CM   1. History of liver cancer  Z85.05 V10.07   2. Compensated HCV cirrhosis  K74.69 571.5    B19.20 070.54   3. Secondary esophageal varices without bleeding  I85.10 456.21           Plan:  1.  Compensated HCV cirrhosis: The patient's cirrhosis appears to be compensated.  She has not had evidence of ascites or esophageal variceal bleeding.  She has had no issues with memory or concentration to indicate hepatic encephalopathy.  Overall, she appears to be doing well.  2.   History of liver cancer: Recent AFP is normal at 7.07 ng/mL.  She has not had unusual weight loss.  Her appetite is good.  3.   Esophageal varices: Patient does have grade 1 esophageal varices in the distal esophagus.  She denies hematemesis or melena.  CBC performed yesterday revealed a hemoglobin of 14.3 and hematocrit of 42.9.    MEDS ORDERED DURING VISIT:  No orders of the defined types were placed in this encounter.      Return in about 6 months (around 8/22/2024).             This document has been electronically signed by Rahel Sesay MD   February 22, 2024 15:59  EST      Part of this note may be an electronic transcription/translation of spoken language to printed text using the Dragon Dictation System.

## 2024-02-22 NOTE — TELEPHONE ENCOUNTER
Caller: Noemy Brooks    Relationship: Self    Best call back number: [phone]    What test was performed: LABS    When was the test performed: 2/21    Where was the test performed: [facility]

## 2024-03-17 ENCOUNTER — APPOINTMENT (OUTPATIENT)
Dept: ULTRASOUND IMAGING | Facility: HOSPITAL | Age: 64
End: 2024-03-17
Payer: MEDICARE

## 2024-03-17 ENCOUNTER — HOSPITAL ENCOUNTER (EMERGENCY)
Facility: HOSPITAL | Age: 64
Discharge: HOME | End: 2024-03-17
Attending: STUDENT IN AN ORGANIZED HEALTH CARE EDUCATION/TRAINING PROGRAM | Admitting: EMERGENCY MEDICINE
Payer: MEDICARE

## 2024-03-17 ENCOUNTER — APPOINTMENT (OUTPATIENT)
Dept: CT IMAGING | Facility: HOSPITAL | Age: 64
End: 2024-03-17
Payer: MEDICARE

## 2024-03-17 VITALS
TEMPERATURE: 97.7 F | HEIGHT: 67 IN | RESPIRATION RATE: 18 BRPM | BODY MASS INDEX: 21.82 KG/M2 | OXYGEN SATURATION: 92 % | DIASTOLIC BLOOD PRESSURE: 81 MMHG | WEIGHT: 139 LBS | SYSTOLIC BLOOD PRESSURE: 144 MMHG | HEART RATE: 103 BPM

## 2024-03-17 DIAGNOSIS — K80.20 GALLSTONES: ICD-10-CM

## 2024-03-17 DIAGNOSIS — R11.2 NAUSEA AND VOMITING, UNSPECIFIED VOMITING TYPE: ICD-10-CM

## 2024-03-17 DIAGNOSIS — R10.10 PAIN OF UPPER ABDOMEN: Primary | ICD-10-CM

## 2024-03-17 LAB
A-A DO2: 50.9 MMHG (ref 0–300)
ALBUMIN SERPL-MCNC: 4.1 G/DL (ref 3.5–5.2)
ALBUMIN/GLOB SERPL: 1.1 G/DL
ALP SERPL-CCNC: 110 U/L (ref 39–117)
ALT SERPL W P-5'-P-CCNC: 33 U/L (ref 1–33)
ANION GAP SERPL CALCULATED.3IONS-SCNC: 14.1 MMOL/L (ref 5–15)
ARTERIAL PATENCY WRIST A: ABNORMAL
AST SERPL-CCNC: 40 U/L (ref 1–32)
ATMOSPHERIC PRESS: 722 MMHG
BASE EXCESS BLDA CALC-SCNC: -2.2 MMOL/L (ref 0–2)
BASOPHILS # BLD AUTO: 0.04 10*3/MM3 (ref 0–0.2)
BASOPHILS NFR BLD AUTO: 0.4 % (ref 0–1.5)
BDY SITE: ABNORMAL
BILIRUB SERPL-MCNC: 1.7 MG/DL (ref 0–1.2)
BILIRUB UR QL STRIP: NEGATIVE
BUN SERPL-MCNC: 15 MG/DL (ref 8–23)
BUN/CREAT SERPL: 21.7 (ref 7–25)
CALCIUM SPEC-SCNC: 9.8 MG/DL (ref 8.6–10.5)
CHLORIDE SERPL-SCNC: 108 MMOL/L (ref 98–107)
CLARITY UR: CLEAR
CO2 BLDA-SCNC: 22 MMOL/L (ref 22–33)
CO2 SERPL-SCNC: 21.9 MMOL/L (ref 22–29)
COHGB MFR BLD: 1.6 % (ref 0–5)
COLOR UR: ABNORMAL
CREAT SERPL-MCNC: 0.69 MG/DL (ref 0.57–1)
D-LACTATE SERPL-SCNC: 1.4 MMOL/L (ref 0.5–2)
DEPRECATED RDW RBC AUTO: 51.8 FL (ref 37–54)
EGFRCR SERPLBLD CKD-EPI 2021: 95.9 ML/MIN/1.73
EOSINOPHIL # BLD AUTO: 0.13 10*3/MM3 (ref 0–0.4)
EOSINOPHIL NFR BLD AUTO: 1.4 % (ref 0.3–6.2)
ERYTHROCYTE [DISTWIDTH] IN BLOOD BY AUTOMATED COUNT: 13.8 % (ref 12.3–15.4)
GLOBULIN UR ELPH-MCNC: 3.9 GM/DL
GLUCOSE SERPL-MCNC: 123 MG/DL (ref 65–99)
GLUCOSE UR STRIP-MCNC: NEGATIVE MG/DL
HCO3 BLDA-SCNC: 21.1 MMOL/L (ref 20–26)
HCT VFR BLD AUTO: 49.4 % (ref 34–46.6)
HCT VFR BLD CALC: 45.1 % (ref 38–51)
HGB BLD-MCNC: 16.5 G/DL (ref 12–15.9)
HGB BLDA-MCNC: 14.7 G/DL (ref 13.5–17.5)
HGB UR QL STRIP.AUTO: NEGATIVE
HOLD SPECIMEN: NORMAL
HOLD SPECIMEN: NORMAL
IMM GRANULOCYTES # BLD AUTO: 0.03 10*3/MM3 (ref 0–0.05)
IMM GRANULOCYTES NFR BLD AUTO: 0.3 % (ref 0–0.5)
INHALED O2 CONCENTRATION: 21 %
KETONES UR QL STRIP: ABNORMAL
LEUKOCYTE ESTERASE UR QL STRIP.AUTO: NEGATIVE
LIPASE SERPL-CCNC: 23 U/L (ref 13–60)
LYMPHOCYTES # BLD AUTO: 0.39 10*3/MM3 (ref 0.7–3.1)
LYMPHOCYTES NFR BLD AUTO: 4.2 % (ref 19.6–45.3)
Lab: ABNORMAL
MCH RBC QN AUTO: 33.7 PG (ref 26.6–33)
MCHC RBC AUTO-ENTMCNC: 33.4 G/DL (ref 31.5–35.7)
MCV RBC AUTO: 100.8 FL (ref 79–97)
METHGB BLD QL: 0.3 % (ref 0–3)
MODALITY: ABNORMAL
MONOCYTES # BLD AUTO: 0.33 10*3/MM3 (ref 0.1–0.9)
MONOCYTES NFR BLD AUTO: 3.5 % (ref 5–12)
NEUTROPHILS NFR BLD AUTO: 8.38 10*3/MM3 (ref 1.7–7)
NEUTROPHILS NFR BLD AUTO: 90.2 % (ref 42.7–76)
NITRITE UR QL STRIP: NEGATIVE
NOTE: ABNORMAL
NOTIFIED BY: ABNORMAL
NOTIFIED WHO: ABNORMAL
NRBC BLD AUTO-RTO: 0 /100 WBC (ref 0–0.2)
OXYHGB MFR BLDV: 88.4 % (ref 94–99)
PCO2 BLDA: 31.6 MM HG (ref 35–45)
PCO2 TEMP ADJ BLD: ABNORMAL MM[HG]
PH BLDA: 7.43 PH UNITS (ref 7.35–7.45)
PH UR STRIP.AUTO: 6 [PH] (ref 5–8)
PH, TEMP CORRECTED: ABNORMAL
PLATELET # BLD AUTO: 133 10*3/MM3 (ref 140–450)
PMV BLD AUTO: 9.5 FL (ref 6–12)
PO2 BLDA: 55.2 MM HG (ref 83–108)
PO2 TEMP ADJ BLD: ABNORMAL MM[HG]
POTASSIUM SERPL-SCNC: 3.7 MMOL/L (ref 3.5–5.2)
PROT SERPL-MCNC: 8 G/DL (ref 6–8.5)
PROT UR QL STRIP: ABNORMAL
RBC # BLD AUTO: 4.9 10*6/MM3 (ref 3.77–5.28)
SAO2 % BLDCOA: 90.1 % (ref 94–99)
SODIUM SERPL-SCNC: 144 MMOL/L (ref 136–145)
SP GR UR STRIP: 1.02 (ref 1–1.03)
UROBILINOGEN UR QL STRIP: ABNORMAL
VENTILATOR MODE: ABNORMAL
WBC NRBC COR # BLD AUTO: 9.3 10*3/MM3 (ref 3.4–10.8)
WHOLE BLOOD HOLD COAG: NORMAL
WHOLE BLOOD HOLD SPECIMEN: NORMAL

## 2024-03-17 PROCEDURE — 25810000003 SODIUM CHLORIDE 0.9 % SOLUTION: Performed by: STUDENT IN AN ORGANIZED HEALTH CARE EDUCATION/TRAINING PROGRAM

## 2024-03-17 PROCEDURE — 81003 URINALYSIS AUTO W/O SCOPE: CPT | Performed by: STUDENT IN AN ORGANIZED HEALTH CARE EDUCATION/TRAINING PROGRAM

## 2024-03-17 PROCEDURE — 36415 COLL VENOUS BLD VENIPUNCTURE: CPT

## 2024-03-17 PROCEDURE — 99284 EMERGENCY DEPT VISIT MOD MDM: CPT

## 2024-03-17 PROCEDURE — 82805 BLOOD GASES W/O2 SATURATION: CPT

## 2024-03-17 PROCEDURE — 83690 ASSAY OF LIPASE: CPT | Performed by: STUDENT IN AN ORGANIZED HEALTH CARE EDUCATION/TRAINING PROGRAM

## 2024-03-17 PROCEDURE — 80053 COMPREHEN METABOLIC PANEL: CPT | Performed by: STUDENT IN AN ORGANIZED HEALTH CARE EDUCATION/TRAINING PROGRAM

## 2024-03-17 PROCEDURE — 25010000002 PROCHLORPERAZINE 10 MG/2ML SOLUTION: Performed by: EMERGENCY MEDICINE

## 2024-03-17 PROCEDURE — 83050 HGB METHEMOGLOBIN QUAN: CPT

## 2024-03-17 PROCEDURE — 25010000002 ONDANSETRON PER 1 MG: Performed by: STUDENT IN AN ORGANIZED HEALTH CARE EDUCATION/TRAINING PROGRAM

## 2024-03-17 PROCEDURE — 74176 CT ABD & PELVIS W/O CONTRAST: CPT | Performed by: RADIOLOGY

## 2024-03-17 PROCEDURE — 83605 ASSAY OF LACTIC ACID: CPT | Performed by: STUDENT IN AN ORGANIZED HEALTH CARE EDUCATION/TRAINING PROGRAM

## 2024-03-17 PROCEDURE — 36600 WITHDRAWAL OF ARTERIAL BLOOD: CPT

## 2024-03-17 PROCEDURE — 74176 CT ABD & PELVIS W/O CONTRAST: CPT

## 2024-03-17 PROCEDURE — 96374 THER/PROPH/DIAG INJ IV PUSH: CPT

## 2024-03-17 PROCEDURE — 25010000002 MORPHINE PER 10 MG: Performed by: EMERGENCY MEDICINE

## 2024-03-17 PROCEDURE — 76705 ECHO EXAM OF ABDOMEN: CPT | Performed by: RADIOLOGY

## 2024-03-17 PROCEDURE — 82375 ASSAY CARBOXYHB QUANT: CPT

## 2024-03-17 PROCEDURE — 76705 ECHO EXAM OF ABDOMEN: CPT

## 2024-03-17 PROCEDURE — 96375 TX/PRO/DX INJ NEW DRUG ADDON: CPT

## 2024-03-17 PROCEDURE — 85025 COMPLETE CBC W/AUTO DIFF WBC: CPT | Performed by: STUDENT IN AN ORGANIZED HEALTH CARE EDUCATION/TRAINING PROGRAM

## 2024-03-17 RX ORDER — ONDANSETRON 4 MG/1
4 TABLET, ORALLY DISINTEGRATING ORAL EVERY 6 HOURS PRN
Qty: 12 TABLET | Refills: 0 | Status: SHIPPED | OUTPATIENT
Start: 2024-03-17

## 2024-03-17 RX ORDER — OXYCODONE HYDROCHLORIDE 5 MG/1
5 TABLET ORAL EVERY 6 HOURS PRN
Qty: 10 TABLET | Refills: 0 | Status: SHIPPED | OUTPATIENT
Start: 2024-03-17

## 2024-03-17 RX ORDER — MORPHINE SULFATE 2 MG/ML
2 INJECTION, SOLUTION INTRAMUSCULAR; INTRAVENOUS ONCE
Status: COMPLETED | OUTPATIENT
Start: 2024-03-17 | End: 2024-03-17

## 2024-03-17 RX ORDER — PROCHLORPERAZINE EDISYLATE 5 MG/ML
2.5 INJECTION INTRAMUSCULAR; INTRAVENOUS ONCE
Status: COMPLETED | OUTPATIENT
Start: 2024-03-17 | End: 2024-03-17

## 2024-03-17 RX ORDER — LIDOCAINE HYDROCHLORIDE 20 MG/ML
15 SOLUTION OROPHARYNGEAL ONCE
Status: COMPLETED | OUTPATIENT
Start: 2024-03-17 | End: 2024-03-17

## 2024-03-17 RX ORDER — PHENOBARBITAL, HYOSCYAMINE SULFATE, ATROPINE SULFATE AND SCOPOLAMINE HYDROBROMIDE .0194; .1037; 16.2; .0065 MG/1; MG/1; MG/1; MG/1
2 TABLET ORAL ONCE
Status: COMPLETED | OUTPATIENT
Start: 2024-03-17 | End: 2024-03-17

## 2024-03-17 RX ORDER — ALUMINA, MAGNESIA, AND SIMETHICONE 2400; 2400; 240 MG/30ML; MG/30ML; MG/30ML
15 SUSPENSION ORAL ONCE
Status: COMPLETED | OUTPATIENT
Start: 2024-03-17 | End: 2024-03-17

## 2024-03-17 RX ORDER — PANTOPRAZOLE SODIUM 40 MG/10ML
40 INJECTION, POWDER, LYOPHILIZED, FOR SOLUTION INTRAVENOUS ONCE
Status: COMPLETED | OUTPATIENT
Start: 2024-03-17 | End: 2024-03-17

## 2024-03-17 RX ORDER — ONDANSETRON 2 MG/ML
4 INJECTION INTRAMUSCULAR; INTRAVENOUS ONCE
Status: COMPLETED | OUTPATIENT
Start: 2024-03-17 | End: 2024-03-17

## 2024-03-17 RX ORDER — SODIUM CHLORIDE 0.9 % (FLUSH) 0.9 %
10 SYRINGE (ML) INJECTION AS NEEDED
Status: DISCONTINUED | OUTPATIENT
Start: 2024-03-17 | End: 2024-03-18 | Stop reason: HOSPADM

## 2024-03-17 RX ORDER — PANTOPRAZOLE SODIUM 40 MG/1
40 TABLET, DELAYED RELEASE ORAL DAILY
Qty: 30 TABLET | Refills: 0 | Status: SHIPPED | OUTPATIENT
Start: 2024-03-17

## 2024-03-17 RX ADMIN — ONDANSETRON 4 MG: 2 INJECTION INTRAMUSCULAR; INTRAVENOUS at 17:30

## 2024-03-17 RX ADMIN — LIDOCAINE HYDROCHLORIDE 15 ML: 20 SOLUTION ORAL at 23:19

## 2024-03-17 RX ADMIN — PROCHLORPERAZINE EDISYLATE 2.5 MG: 5 INJECTION INTRAMUSCULAR; INTRAVENOUS at 23:18

## 2024-03-17 RX ADMIN — SODIUM CHLORIDE 1000 ML: 9 INJECTION, SOLUTION INTRAVENOUS at 17:30

## 2024-03-17 RX ADMIN — PHENOBARBITAL, HYOSCYAMINE SULFATE, ATROPINE SULFATE, SCOPOLAMINE HYDROBROMIDE 32.4 MG: 16.2; .1037; .0194; .0065 TABLET ORAL at 23:18

## 2024-03-17 RX ADMIN — ALUMINUM HYDROXIDE, MAGNESIUM HYDROXIDE, AND DIMETHICONE 15 ML: 400; 400; 40 SUSPENSION ORAL at 23:19

## 2024-03-17 RX ADMIN — PANTOPRAZOLE SODIUM 40 MG: 40 INJECTION, POWDER, FOR SOLUTION INTRAVENOUS at 23:19

## 2024-03-17 RX ADMIN — MORPHINE SULFATE 2 MG: 2 INJECTION, SOLUTION INTRAMUSCULAR; INTRAVENOUS at 23:18

## 2024-03-17 NOTE — ED PROVIDER NOTES
Subjective   History of Present Illness  64-year-old female with a past medical history of cirrhosis, COPD, hypertension, hyperlipidemia presents to the ER due to concerns for increasing nausea and vomiting.  Symptoms been present since yesterday evening.  Associated abdominal pain.  Mild streaking of blood in her vomit.  No jaylen hematemesis.  No hematochezia or mopped assist.  No obvious fever or chills.  Vital stable.  Afebrile      Review of Systems   Gastrointestinal:  Positive for abdominal pain, nausea and vomiting.   All other systems reviewed and are negative.      Past Medical History:   Diagnosis Date    Acid reflux     Aortic aneurysm     Cancer     LIVER, UTERINE    Cirrhosis     COPD (chronic obstructive pulmonary disease)     Depression     Disease of thyroid gland     Elevated cholesterol     High cholesterol     History of transfusion     Hypertension     Infectious viral hepatitis     2019    Osteoporosis     Ovarian cancer        Allergies   Allergen Reactions    Codeine GI Intolerance       Past Surgical History:   Procedure Laterality Date    APPENDECTOMY N/A     COLONOSCOPY      ENDOSCOPY N/A 8/8/2023    Procedure: ESOPHAGOGASTRODUODENOSCOPY WITH BIOPSY;  Surgeon: Rahel Sesay MD;  Location: [facility];  Service: Gastroenterology;  Laterality: N/A;    FRACTURE SURGERY Left     ARM, HAS 2 SCREWS    HYSTERECTOMY      UPPER GASTROINTESTINAL ENDOSCOPY         Family History   Problem Relation Age of Onset    Stroke Mother     Hypertension Mother     COPD Mother     Throat cancer Mother     Arthritis Mother     Asthma Mother     Heart attack Mother     Stroke Father     Cancer Father     Liver cancer Father        Social History     Socioeconomic History    Marital status:     Number of children: 3   Tobacco Use    Smoking status: Former     Current packs/day: 0.00     Average packs/day: 1.5 packs/day for 25.0 years (37.5 ttl pk-yrs)     Types: Cigarettes     Start date: 5/1/1990      Quit date: 2015     Years since quittin.8    Smokeless tobacco: Never    Tobacco comments:     smoked for approx 20 years, 1.5 to 2 ppd   Vaping Use    Vaping status: Never Used   Substance and Sexual Activity    Alcohol use: No     Comment: she previously drank daily for 16 years, beer    Drug use: No    Sexual activity: Defer           Objective   Physical Exam  Constitutional:       General: She is not in acute distress.     Appearance: Normal appearance. She is not ill-appearing.   HENT:      Head: Normocephalic and atraumatic.      Right Ear: External ear normal.      Left Ear: External ear normal.      Nose: Nose normal.      Mouth/Throat:      Mouth: Mucous membranes are moist.   Eyes:      Extraocular Movements: Extraocular movements intact.      Pupils: Pupils are equal, round, and reactive to light.   Cardiovascular:      Rate and Rhythm: Normal rate and regular rhythm.      Heart sounds: No murmur heard.  Pulmonary:      Effort: Pulmonary effort is normal. No respiratory distress.      Breath sounds: Normal breath sounds. No wheezing.   Abdominal:      General: Bowel sounds are normal.      Palpations: Abdomen is soft.      Tenderness: There is generalized abdominal tenderness.   Musculoskeletal:         General: No deformity or signs of injury. Normal range of motion.      Cervical back: Normal range of motion and neck supple.   Skin:     General: Skin is warm and dry.      Findings: No erythema.   Neurological:      General: No focal deficit present.      Mental Status: She is alert and oriented to person, place, and time. Mental status is at baseline.      Cranial Nerves: No cranial nerve deficit.   Psychiatric:         Mood and Affect: Mood normal.         Behavior: Behavior normal.         Thought Content: Thought content normal.         Procedures  US Gallbladder   Final Result   Impression:       1.  Cholelithiasis   2.  No features of cholecystitis.   3.  Normal common bile duct.   4.   No free fluid in the right upper quadrant.       This report was finalized on 3/17/2024 7:07 PM by Jose Manuel Dillon MD.          CT Abdomen Pelvis Without Contrast   Final Result   Impression:       No acute intraabdominal findings.       Cirrhotic liver morphology.                       This report was finalized on 3/17/2024 6:33 PM by Elva Parks MD.            Results for orders placed or performed during the hospital encounter of 03/17/24   Comprehensive Metabolic Panel    Specimen: Arm, Right; Blood   Result Value Ref Range    Glucose 123 (H) 65 - 99 mg/dL    BUN 15 8 - 23 mg/dL    Creatinine 0.69 0.57 - 1.00 mg/dL    Sodium 144 136 - 145 mmol/L    Potassium 3.7 3.5 - 5.2 mmol/L    Chloride 108 (H) 98 - 107 mmol/L    CO2 21.9 (L) 22.0 - 29.0 mmol/L    Calcium 9.8 8.6 - 10.5 mg/dL    Total Protein 8.0 6.0 - 8.5 g/dL    Albumin 4.1 3.5 - 5.2 g/dL    ALT (SGPT) 33 1 - 33 U/L    AST (SGOT) 40 (H) 1 - 32 U/L    Alkaline Phosphatase 110 39 - 117 U/L    Total Bilirubin 1.7 (H) 0.0 - 1.2 mg/dL    Globulin 3.9 gm/dL    A/G Ratio 1.1 g/dL    BUN/Creatinine Ratio 21.7 7.0 - 25.0    Anion Gap 14.1 5.0 - 15.0 mmol/L    eGFR 95.9 >60.0 mL/min/1.73   Lipase    Specimen: Arm, Right; Blood   Result Value Ref Range    Lipase 23 13 - 60 U/L   Urinalysis With Microscopic If Indicated (No Culture) - Urine, Clean Catch    Specimen: Urine, Clean Catch   Result Value Ref Range    Color, UA Dark Yellow (A) Yellow, Straw    Appearance, UA Clear Clear    pH, UA 6.0 5.0 - 8.0    Specific Gravity, UA 1.020 1.005 - 1.030    Glucose, UA Negative Negative    Ketones, UA Trace (A) Negative    Bilirubin, UA Negative Negative    Blood, UA Negative Negative    Protein, UA Trace (A) Negative    Leuk Esterase, UA Negative Negative    Nitrite, UA Negative Negative    Urobilinogen, UA 1.0 E.U./dL 0.2 - 1.0 E.U./dL   Lactic Acid, Plasma    Specimen: Arm, Right; Blood   Result Value Ref Range    Lactate 1.4 0.5 - 2.0 mmol/L   CBC Auto Differential     Specimen: Arm, Right; Blood   Result Value Ref Range    WBC 9.30 3.40 - 10.80 10*3/mm3    RBC 4.90 3.77 - 5.28 10*6/mm3    Hemoglobin 16.5 (H) 12.0 - 15.9 g/dL    Hematocrit 49.4 (H) 34.0 - 46.6 %    .8 (H) 79.0 - 97.0 fL    MCH 33.7 (H) 26.6 - 33.0 pg    MCHC 33.4 31.5 - 35.7 g/dL    RDW 13.8 12.3 - 15.4 %    RDW-SD 51.8 37.0 - 54.0 fl    MPV 9.5 6.0 - 12.0 fL    Platelets 133 (L) 140 - 450 10*3/mm3    Neutrophil % 90.2 (H) 42.7 - 76.0 %    Lymphocyte % 4.2 (L) 19.6 - 45.3 %    Monocyte % 3.5 (L) 5.0 - 12.0 %    Eosinophil % 1.4 0.3 - 6.2 %    Basophil % 0.4 0.0 - 1.5 %    Immature Grans % 0.3 0.0 - 0.5 %    Neutrophils, Absolute 8.38 (H) 1.70 - 7.00 10*3/mm3    Lymphocytes, Absolute 0.39 (L) 0.70 - 3.10 10*3/mm3    Monocytes, Absolute 0.33 0.10 - 0.90 10*3/mm3    Eosinophils, Absolute 0.13 0.00 - 0.40 10*3/mm3    Basophils, Absolute 0.04 0.00 - 0.20 10*3/mm3    Immature Grans, Absolute 0.03 0.00 - 0.05 10*3/mm3    nRBC 0.0 0.0 - 0.2 /100 WBC   Blood Gas, Arterial With Co-Ox    Specimen: Arterial Blood   Result Value Ref Range    Site Right Brachial     Galen's Test N/A     pH, Arterial 7.432 7.350 - 7.450 pH units    pCO2, Arterial 31.6 (L) 35.0 - 45.0 mm Hg    pO2, Arterial 55.2 (L) 83.0 - 108.0 mm Hg    HCO3, Arterial 21.1 20.0 - 26.0 mmol/L    Base Excess, Arterial -2.2 (L) 0.0 - 2.0 mmol/L    O2 Saturation, Arterial 90.1 (L) 94.0 - 99.0 %    Hemoglobin, Blood Gas 14.7 13.5 - 17.5 g/dL    Hematocrit, Blood Gas 45.1 38.0 - 51.0 %    Oxyhemoglobin 88.4 (L) 94 - 99 %    Methemoglobin 0.30 0.00 - 3.00 %    Carboxyhemoglobin 1.6 0 - 5 %    A-a DO2 50.9 0.0 - 300.0 mmHg    CO2 Content 22.0 22 - 33 mmol/L    Barometric Pressure for Blood Gas 722 mmHg    Modality Room Air     FIO2 21 %    Ventilator Mode NA     Note Read back and acknowledge     Notified Who DR BOYKIN // RN     Notified By THUAN     Collected by [id number]     pH, Temp Corrected      pCO2, Temperature Corrected      pO2, Temperature Corrected      Green Top (Gel)   Result Value Ref Range    Extra Tube Hold for add-ons.    Lavender Top   Result Value Ref Range    Extra Tube hold for add-on    Gold Top - SST   Result Value Ref Range    Extra Tube Hold for add-ons.    Light Blue Top   Result Value Ref Range    Extra Tube Hold for add-ons.                 ED Course  ED Course as of 03/17/24 2322   Sun Mar 17, 2024   1838 Care of this patient was transferred to the next attending physician at shift change.  Complete discussion of presentation, labs, imaging, care, and expected course occurred during transition of providers.  Vitals stable at transfer of care.    Electronically signed by Lakhwinder Camacho DO, 03/17/24, 6:38 PM EDT.   [SF]   2310 I assumed patient's care from Dr. Camacho this evening at shift change.  Please see his documentation above.  Remainder of patient's emergency department stay has not been complicated.  Studies are showing cholelithiasis but no evidence of cholecystitis.  Patient has some tenderness in the epigastrium and right upper quadrant, no other tenderness, no acute peritoneal signs, no Zamora sign.  She has known about these gallstones for a long time but states that her GI doctor, Dr. Montgomery, does not wish for her to have a cholecystectomy as she feels that she would have a high chance of death from the surgery.  Will treat the patient symptomatically, discontinue the omeprazole and start her on Protonix 40 mg daily.  She will follow-up closely with Dr. Montgomery.  She will return to the emergency department right away if symptoms worsen or any problems.  She is discharged home in care of her sister. [CM]      ED Course User Index  [CM] Mike Baez MD  [SF] Lakhwinder Camacho DO                                             Medical Decision Making  Problems Addressed:  Nausea and vomiting, unspecified vomiting type: acute illness or injury  Pain of upper abdomen: acute illness or injury    Amount and/or Complexity of Data  Reviewed  Labs: ordered.  Radiology: ordered.    Risk  OTC drugs.  Prescription drug management.        Final diagnoses:   Pain of upper abdomen   Gallstones   Nausea and vomiting, unspecified vomiting type       ED Disposition  ED Disposition       ED Disposition   Discharge    Condition   Stable    Comment   --               Rahel Sesay MD  [address] [facility]  [address]  [facility] [address]  [phone]    Go to   Call in the morning to schedule first available appointment    Haley Bailey DO  [address] [facility] [id number] [phone]    Go to   1 to 2 days, if unable to see Dr. Montgomery    [facility] EMERGENCY DEPARTMENT  [address] [facility] [address] [phone]  Go to   If symptoms worsen         Medication List        New Prescriptions      ondansetron ODT 4 MG disintegrating tablet  Commonly known as: ZOFRAN-ODT  Place 1 tablet on the tongue Every 6 (Six) Hours As Needed for Nausea or Vomiting.     oxyCODONE 5 MG immediate release tablet  Commonly known as: Roxicodone  Take 1 tablet by mouth Every 6 (Six) Hours As Needed for Severe Pain.     pantoprazole 40 MG EC tablet  Commonly known as: PROTONIX  Take 1 tablet by mouth Daily.            Stop      omeprazole 20 MG capsule  Commonly known as: priLOSEC               Where to Get Your Medications        These medications were sent to [facility] - [place], [place] - [address] [facility] - [phone]  - [phone] FX  [address] [facility] [address]      Phone: [phone]   ondansetron ODT 4 MG disintegrating tablet  oxyCODONE 5 MG immediate release tablet  pantoprazole 40 MG EC tablet       Please note that portions of this note were completed with a voice recognition program.        Mike Baez MD  03/17/24 8063

## 2024-03-18 NOTE — DISCHARGE INSTRUCTIONS
Home in care of sister.  Rest.  Take your medication as prescribed.  Call Dr. Montgomery's office in the morning to schedule her first available appointment.  See Dr. Bailey in the office in 1 to 2 days if unable to see Dr. Montgomery.  Return to the emergency department right away if symptoms worsen/any problems.

## 2024-04-08 ENCOUNTER — TELEPHONE (OUTPATIENT)
Dept: ONCOLOGY | Facility: CLINIC | Age: 64
End: 2024-04-08
Payer: MEDICARE

## 2024-04-08 NOTE — TELEPHONE ENCOUNTER
Patient called, she said she had some lab work drawn at [facility] and her thyroid level was 10. She is concerned and has questions on what she should do with her thyroid medication Dr. Boss had prescribed. I called [facility] at [phone] to get those results but I had to leave a message.

## 2024-04-26 ENCOUNTER — TELEPHONE (OUTPATIENT)
Dept: ONCOLOGY | Facility: CLINIC | Age: 64
End: 2024-04-26
Payer: MEDICARE

## 2024-04-26 NOTE — TELEPHONE ENCOUNTER
Caller: Noemy Brooks    Relationship: Self    Best call back number: [phone]    What is the best time to reach you: ANYTIME    Who are you requesting to speak with (clinical staff, provider,  specific staff member): CLINICAL     What was the call regarding: PATIENT WOULD LIKE FOR DR. GURROLA TO LOOK IN CARE EVERYWHERE AT HER IMAGES AND NOTES FROM FRANCISCO LOWE FROM [facility] THAT WAS DONE ON 4/17/2024.      CALL TO DISCUSS

## 2024-04-26 NOTE — TELEPHONE ENCOUNTER
RN returned patient's call to let her know that Dr. Boss is out of office but will be back Tuesday and can review the notes/images done on 4/17 when he returns. Patient has no further questions or concerns at this time. RN forwarded call to Dr. Boss's RN so she is aware.

## 2024-05-14 ENCOUNTER — HOSPITAL ENCOUNTER (OUTPATIENT)
Dept: MRI IMAGING | Facility: HOSPITAL | Age: 64
Discharge: HOME | End: 2024-05-14
Admitting: INTERNAL MEDICINE
Payer: MEDICARE

## 2024-05-14 DIAGNOSIS — Z79.899 LONG-TERM USE OF HIGH-RISK MEDICATION: ICD-10-CM

## 2024-05-14 DIAGNOSIS — C22.0 HEPATOCELLULAR CARCINOMA: ICD-10-CM

## 2024-05-14 PROCEDURE — A9577 INJ MULTIHANCE: HCPCS | Performed by: INTERNAL MEDICINE

## 2024-05-14 PROCEDURE — 0 GADOBENATE DIMEGLUMINE 529 MG/ML SOLUTION: Performed by: INTERNAL MEDICINE

## 2024-05-14 PROCEDURE — 74183 MRI ABD W/O CNTR FLWD CNTR: CPT

## 2024-05-14 RX ADMIN — GADOBENATE DIMEGLUMINE 15 ML: 529 INJECTION, SOLUTION INTRAVENOUS at 10:46

## 2024-05-21 ENCOUNTER — LAB (OUTPATIENT)
Dept: ONCOLOGY | Facility: CLINIC | Age: 64
End: 2024-05-21
Payer: MEDICARE

## 2024-05-21 ENCOUNTER — OFFICE VISIT (OUTPATIENT)
Dept: ONCOLOGY | Facility: CLINIC | Age: 64
End: 2024-05-21
Payer: MEDICARE

## 2024-05-21 VITALS
SYSTOLIC BLOOD PRESSURE: 151 MMHG | HEART RATE: 73 BPM | WEIGHT: 140.2 LBS | BODY MASS INDEX: 22 KG/M2 | TEMPERATURE: 98 F | DIASTOLIC BLOOD PRESSURE: 80 MMHG | RESPIRATION RATE: 18 BRPM | OXYGEN SATURATION: 93 % | HEIGHT: 67 IN

## 2024-05-21 DIAGNOSIS — Z79.899 LONG-TERM USE OF HIGH-RISK MEDICATION: ICD-10-CM

## 2024-05-21 DIAGNOSIS — C22.0 HEPATOCELLULAR CARCINOMA: Primary | ICD-10-CM

## 2024-05-21 DIAGNOSIS — C22.0 HEPATOCELLULAR CARCINOMA: ICD-10-CM

## 2024-05-21 LAB
ALBUMIN SERPL-MCNC: 3.8 G/DL (ref 3.5–5.2)
ALBUMIN/GLOB SERPL: 1.2 G/DL
ALP SERPL-CCNC: 110 U/L (ref 39–117)
ALT SERPL W P-5'-P-CCNC: 33 U/L (ref 1–33)
ANION GAP SERPL CALCULATED.3IONS-SCNC: 9.6 MMOL/L (ref 5–15)
AST SERPL-CCNC: 46 U/L (ref 1–32)
BASOPHILS # BLD AUTO: 0.07 10*3/MM3 (ref 0–0.2)
BASOPHILS NFR BLD AUTO: 1.4 % (ref 0–1.5)
BILIRUB SERPL-MCNC: 0.7 MG/DL (ref 0–1.2)
BUN SERPL-MCNC: 7 MG/DL (ref 8–23)
BUN/CREAT SERPL: 10.4 (ref 7–25)
CALCIUM SPEC-SCNC: 9.6 MG/DL (ref 8.6–10.5)
CHLORIDE SERPL-SCNC: 111 MMOL/L (ref 98–107)
CO2 SERPL-SCNC: 23.4 MMOL/L (ref 22–29)
CREAT SERPL-MCNC: 0.67 MG/DL (ref 0.57–1)
DEPRECATED RDW RBC AUTO: 51.2 FL (ref 37–54)
EGFRCR SERPLBLD CKD-EPI 2021: 96.5 ML/MIN/1.73
EOSINOPHIL # BLD AUTO: 0.36 10*3/MM3 (ref 0–0.4)
EOSINOPHIL NFR BLD AUTO: 7.3 % (ref 0.3–6.2)
ERYTHROCYTE [DISTWIDTH] IN BLOOD BY AUTOMATED COUNT: 13.6 % (ref 12.3–15.4)
GLOBULIN UR ELPH-MCNC: 3.3 GM/DL
GLUCOSE SERPL-MCNC: 95 MG/DL (ref 65–99)
HCT VFR BLD AUTO: 40.9 % (ref 34–46.6)
HGB BLD-MCNC: 13.9 G/DL (ref 12–15.9)
IMM GRANULOCYTES # BLD AUTO: 0.01 10*3/MM3 (ref 0–0.05)
IMM GRANULOCYTES NFR BLD AUTO: 0.2 % (ref 0–0.5)
LYMPHOCYTES # BLD AUTO: 1.17 10*3/MM3 (ref 0.7–3.1)
LYMPHOCYTES NFR BLD AUTO: 23.8 % (ref 19.6–45.3)
MCH RBC QN AUTO: 34.2 PG (ref 26.6–33)
MCHC RBC AUTO-ENTMCNC: 34 G/DL (ref 31.5–35.7)
MCV RBC AUTO: 100.7 FL (ref 79–97)
MONOCYTES # BLD AUTO: 0.54 10*3/MM3 (ref 0.1–0.9)
MONOCYTES NFR BLD AUTO: 11 % (ref 5–12)
NEUTROPHILS NFR BLD AUTO: 2.77 10*3/MM3 (ref 1.7–7)
NEUTROPHILS NFR BLD AUTO: 56.3 % (ref 42.7–76)
NRBC BLD AUTO-RTO: 0 /100 WBC (ref 0–0.2)
PLATELET # BLD AUTO: 126 10*3/MM3 (ref 140–450)
PMV BLD AUTO: 9.8 FL (ref 6–12)
POTASSIUM SERPL-SCNC: 3.5 MMOL/L (ref 3.5–5.2)
PROT SERPL-MCNC: 7.1 G/DL (ref 6–8.5)
RBC # BLD AUTO: 4.06 10*6/MM3 (ref 3.77–5.28)
SODIUM SERPL-SCNC: 144 MMOL/L (ref 136–145)
T4 FREE SERPL-MCNC: 0.93 NG/DL (ref 0.93–1.7)
TSH SERPL DL<=0.05 MIU/L-ACNC: 4.17 UIU/ML (ref 0.27–4.2)
WBC NRBC COR # BLD AUTO: 4.92 10*3/MM3 (ref 3.4–10.8)

## 2024-05-21 PROCEDURE — 1126F AMNT PAIN NOTED NONE PRSNT: CPT | Performed by: INTERNAL MEDICINE

## 2024-05-21 PROCEDURE — 84443 ASSAY THYROID STIM HORMONE: CPT | Performed by: INTERNAL MEDICINE

## 2024-05-21 PROCEDURE — 85025 COMPLETE CBC W/AUTO DIFF WBC: CPT | Performed by: INTERNAL MEDICINE

## 2024-05-21 PROCEDURE — 99214 OFFICE O/P EST MOD 30 MIN: CPT | Performed by: INTERNAL MEDICINE

## 2024-05-21 PROCEDURE — 82105 ALPHA-FETOPROTEIN SERUM: CPT | Performed by: INTERNAL MEDICINE

## 2024-05-21 PROCEDURE — 80053 COMPREHEN METABOLIC PANEL: CPT | Performed by: INTERNAL MEDICINE

## 2024-05-21 PROCEDURE — 84439 ASSAY OF FREE THYROXINE: CPT | Performed by: INTERNAL MEDICINE

## 2024-05-21 NOTE — PROGRESS NOTES
Name:  Noemy Brooks  :  1960  Date:  2024     REFERRING PHYSICIAN  Henri Melvin MD    PRIMARY CARE PHYSICIAN  Lynn, Haley Roca DO    REASON FOR FOLLOWUP  1. Hepatocellular carcinoma      CHIEF COMPLAINT  None.    Dear Dr. Bailey,    HISTORY OF PRESENT ILLNESS:   I saw Ms. Brooks in followup today in our medical oncology clinic. As you are aware, she is a pleasant, 64 y.o., white female with a history of hypertension, hepatitis C (treated with antivirals and reportedly cured in ~), cirrhosis and hepatocellular carcinoma who was initially diagnosed with the latter in ~2022. [place], [place]. Her [place] gastroenterologist referred her to  at that time once she was found to have a couple of liver masses and a serum AFP > 1,000 ng/mL. She was evaluated by the liver transplant service; however, due to the HCC's involvement of the hepatic vessels, she was felt to not be a candidate (for transplant). She was subsequently evaluated by  medical oncology, who recommended starting first-line, palliative treatment with a combination of s7pqsepo bevacizumab (Mvasi) and atezolizumab (Tecentriq), per the current standard of care. She received a total of eight (8) cycles between 2022 and early 2023 through the [facility]. Due to transportation issues (her sister has to be the one to drive her, and the trips to [place] have been getting increasingly difficult for her), she presented to our clinic in 2023 in order to transfer her ongoing, oncologic care to us, closer to her home in [place], [place].    INTERIM HISTORY:  Ms. Brooks returns to clinic today for follow up by herself. She tolerated q8colpdt atezolizumab overall well, and she has now received a total of eighteen (18) cycles to date (with the eighteenth and, to date, most recent one given on 2023). Per her preference, we have been holding this therapy ever since then. She previously stated that the Lord cured  her once before (of Hepatitis C), and she has recently become convinced that he has/will do it again (this time for her HCC). Since holding the immunotherapy, her chronic fatigue has improved. She continues to make an effort to stay as active as possible, and she has no new or other specific complaints today.    Past Medical History:   Diagnosis Date    Acid reflux     Aortic aneurysm     Cancer     LIVER, UTERINE    Cirrhosis     COPD (chronic obstructive pulmonary disease)     Depression     Disease of thyroid gland     Elevated cholesterol     High cholesterol     History of transfusion     Hypertension     Infectious viral hepatitis         Osteoporosis     Ovarian cancer        Past Surgical History:   Procedure Laterality Date    APPENDECTOMY N/A     COLONOSCOPY      ENDOSCOPY N/A 2023    Procedure: ESOPHAGOGASTRODUODENOSCOPY WITH BIOPSY;  Surgeon: Rahel Sesay MD;  Location: [facility];  Service: Gastroenterology;  Laterality: N/A;    FRACTURE SURGERY Left     ARM, HAS 2 SCREWS    HYSTERECTOMY      UPPER GASTROINTESTINAL ENDOSCOPY         Social History     Socioeconomic History    Marital status:     Number of children: 3   Tobacco Use    Smoking status: Former     Current packs/day: 0.00     Average packs/day: 1.5 packs/day for 25.0 years (37.5 ttl pk-yrs)     Types: Cigarettes     Start date: 1990     Quit date: 2015     Years since quittin.0    Smokeless tobacco: Never    Tobacco comments:     smoked for approx 20 years, 1.5 to 2 ppd   Vaping Use    Vaping status: Never Used   Substance and Sexual Activity    Alcohol use: No     Comment: she previously drank daily for 16 years, beer    Drug use: No    Sexual activity: Defer       Family History   Problem Relation Age of Onset    Stroke Mother     Hypertension Mother     COPD Mother     Throat cancer Mother     Arthritis Mother     Asthma Mother     Heart attack Mother     Stroke Father     Cancer Father     Liver  cancer Father        Allergies   Allergen Reactions    Codeine GI Intolerance       Current Outpatient Medications   Medication Sig Dispense Refill    albuterol sulfate  (90 Base) MCG/ACT inhaler Inhale 2 puffs Every 4 (Four) Hours As Needed for Wheezing.      Atezolizumab (TECENTRIQ IV) Infuse  into a venous catheter Every 21 (Twenty-One) Days.      B Complex Vitamins (VITAMIN B COMPLEX PO) Take  by mouth.      budesonide-formoterol (SYMBICORT) 160-4.5 MCG/ACT inhaler Inhale 2 puffs 2 (Two) Times a Day.      cholecalciferol (VITAMIN D3) 25 MCG (1000 UT) tablet Take 1 tablet by mouth Daily.      cyclobenzaprine (FLEXERIL) 5 MG tablet Take 1 tablet by mouth 3 (Three) Times a Day As Needed for Muscle Spasms.      diphenhydrAMINE 12.5 MG/5ML elixir 20 mL, aluminum-magnesium hydroxide-simethicone 400-400-40 MG/5ML suspension 20 mL, Lidocaine Viscous HCl 2 % solution 20 mL Swish and spit 15 mL Every 4 (Four) Hours As Needed.      dronabinol (Marinol) 2.5 MG capsule Take 1 capsule by mouth 2 (Two) Times a Day Before Meals. 60 capsule 2    levothyroxine (SYNTHROID, LEVOTHROID) 25 MCG tablet TAKE ONE (1) TABLET BY MOUTH EVERY MORNING. 30 tablet 5    lisinopril (PRINIVIL,ZESTRIL) 30 MG tablet Take 1 tablet by mouth Daily.      lisinopril-hydrochlorothiazide (PRINZIDE,ZESTORETIC) 20-12.5 MG per tablet Take 1 tablet by mouth Daily.      multivitamin (MULTI VITAMIN DAILY PO) Take 1 tablet by mouth Daily.      ondansetron ODT (ZOFRAN-ODT) 4 MG disintegrating tablet Place 1 tablet on the tongue Every 6 (Six) Hours As Needed for Nausea or Vomiting. 12 tablet 0    pantoprazole (PROTONIX) 40 MG EC tablet Take 1 tablet by mouth Daily. 30 tablet 0    traZODone (DESYREL) 100 MG tablet Take 1 tablet by mouth Every Night.      Magnesium Hydroxide (MILK OF MAGNESIA PO) Take  by mouth. (Patient not taking: Reported on 5/21/2024)      oxyCODONE (Roxicodone) 5 MG immediate release tablet Take 1 tablet by mouth Every 6 (Six) Hours As  "Needed for Severe Pain. (Patient not taking: Reported on 2024) 10 tablet 0     No current facility-administered medications for this visit.     REVIEW OF SYSTEMS  CONSTITUTIONAL:  No fever, chills or night sweats. Improved fatigue, as per the HPI above.  EYES:  No blurry vision, diplopia or other vision changes.  ENT:  No hearing loss, nosebleeds or sore throat.  CARDIOVASCULAR:  No palpitations, arrhythmia, syncopal episodes or edema.  PULMONARY:  No hemoptysis, wheezing, chronic cough or shortness of breath.  GASTROINTESTINAL:  As per the HPI above.  GENITOURINARY:  No hematuria, kidney stones or frequent urination.  MUSCULOSKELETAL:  No joint or back pains.  INTEGUMENTARY: No rashes or pruritus.  ENDOCRINE:  No excessive thirst or hot flashes.  HEMATOLOGIC:  No history of free bleeding, spontaneous bleeding or clotting.  IMMUNOLOGIC:  No allergies or frequent infections.  NEUROLOGIC: No numbness, tingling, seizures or weakness.  PSYCHIATRIC:  No anxiety or depression.    PHYSICAL EXAMINATION  /80   Pulse 73   Temp 98 °F (36.7 °C) (Temporal)   Resp 18   Ht 170.2 cm (67\")   Wt 63.6 kg (140 lb 3.2 oz)   SpO2 93%   BMI 21.96 kg/m²     Pain Score:  Pain Score    24 1217   PainSc: 0-No pain     PHQ-Score Total:  PHQ-9 Total Score:      ECO  GENERAL:  A well-developed, well-nourished, thin, white female in no acute distress.  HEENT:  Pupils equally round and reactive to light. Extraocular muscles intact.  CARDIOVASCULAR:  Regular rate and rhythm. No murmurs, gallops or rubs.  LUNGS:  Clear to auscultation bilaterally.  ABDOMEN:  Soft, nontender, nondistended with positive bowel sounds.  EXTREMITIES:  No clubbing, cyanosis or edema bilaterally.  SKIN:  No rashes or petechiae.  NEURO:  Cranial nerves grossly intact. No focal deficits.  PSYCH:  Alert and oriented x3.    The physical exam is once again unchanged from recent priors.    LABORATORY  Lab Results   Component Value Date    WBC 4.92 " 05/21/2024    HGB 13.9 05/21/2024    HCT 40.9 05/21/2024    .7 (H) 05/21/2024     (L) 05/21/2024    NEUTROABS 2.77 05/21/2024       Lab Results   Component Value Date     05/21/2024    K 3.5 05/21/2024     (H) 05/21/2024    CO2 23.4 05/21/2024    BUN 7 (L) 05/21/2024    CREATININE 0.67 05/21/2024    GLUCOSE 95 05/21/2024    CALCIUM 9.6 05/21/2024    AST 46 (H) 05/21/2024    ALT 33 05/21/2024    ALKPHOS 110 05/21/2024    BILITOT 0.7 05/21/2024    PROTEINTOT 7.1 05/21/2024    ALBUMIN 3.8 05/21/2024     CBC (05/21/2024): WBCs: 4.92; HgB: 13.9; Hct: 40.9; platelets: 126  CBC (02/21/2024): WBCs: 6.29; HgB: 14.3; Hct: 42.9; platelets: 150  CBC (12/19/2023): WBCs: 5.64; HgB: 14.5; Hct: 44.2; platelets: 120  CBC (10/19/2023): WBCs: 6.05; HgB: 14.4; Hct: 43.7; platelets: 119  CBC (08/16/2023): WBCs: 5.87; HgB: 14.3; Hct: 44.2; platelets: 120  CBC (05/24/2023): WBCs: 7.45; HgB: 15.8; Hct: 47.0; platelets: 125  CBC (05/01/2023): WBCs: 6.91; HgB: 15.9; Hct: 47.7; platelets: 118  CBC (04/03/2023): WBCs: 6.68; HgB: 15.9; Hct: 45.7; platelets: 116; MCV: 94    AFP (05/21/2024): pending  AFP (02/21/2024): 7.07 ng/mL  AFP (12/19/2023): 5.88 ng/mL  AFP (11/09/2023): 6.00 ng.mL  AFP (09/28/2023): 5.55 ng/mL  AFP (08/16/2023): 3.96 ng/mL  AFP (07/06/2023): 3.33 ng/mL  AFP (05/24/2023): 2.66 ng/mL  AFP (05/01/2023): < 2 ng/mL  AFP (01/27/2023): 2.6 ng/mL  AFP (10/31/2022): 1031.0 ng/mL    IMAGING  CT liver (10/21/2022, at ):  Impression: LR 5 segment 7 lesion measuring up to 2.8 x 2.5 cm with associated TIV (tumor in vein). LR 3 legion measuring 8 mm in segment 4A.    CT chest, abdomen and pelvis with contrast (01/27/2023, at ):  Impression:  Chest: No convincing metastatic disease in the thorax.  Abdomen/Pelvis: Within the limits of the study, the area of washout representing the known HCC is stable to smaller in size. The component representing the tumor in vein is smaller in size. No new liver lesions. No  distant metastatic disease.    MRI abdomen with and without contrast (04/13/2023):  Impression:  1) No solid arterial phase enhancing liver lesions identified. No delayed phase enhancing liver lesions are noted.  2) Liver cirrhosis and portal hypertension.  3) 0.6 cm simple appearing benign hepatic cyst right lobe.  4) Simple appearing cyst right kidney. No hydronephrosis.  5) Other nonacute findings.    MRI abdomen with and without contrast (11/03/2023, compared to 04/13/2023):  Impression: Little overall change. No contrast-enhancing hepatic lesions are identified.    MRI abdomen with and without contrast (05/14/2024, compared to MRI on 11/03/2023):  Impression:  1) Development of two arterial phase enhancing lesions immediately adjacent to each other localizing to segment 7/8 of the right hepatic lobe worrisome for primary HCC given extensive background changes of liver cirrhosis. Metastatic disease is also within the differential.  2) Stable simple appearing cyst right hepatic lobe.  3) Cholelithiasis.  4) Portal hypertension changes which include presence of splenorenal shunts.  5) Other incidental/nonacute findings.    PATHOLOGY    IMPRESSION AND PLAN  Ms. Brooks is a 64 y.o., white female with:  Hepatocellular carcinoma: Initially diagnosed in Fall 2022 after a CT of the liver (performed on 10/21/2022 at , summarized above) confirmed the presence of two lesions (one measuring 2.8 x 2.5 cm in segment 7 and a probable satellite measuring ~8 mm in segment 4A) in the setting of known, baseline cirrhosis and a serum AFP level of > 1000 ng/mL. Due to venous involvement by the tumor, she was/is not a candidate for a liver transplant.  medical oncology therefore recommended initiating first-line, palliative, systemic treatment with a combination of [id number] bevacizumab (the Avastin biosimilar Mvasi) and [id number] atezolizumab (Tecentriq). She received a total of eight (8), [id number] cycles through the [facility] between Fall 2022 and early April 2023 (the eighth and final cycle of both were given on 04/03/2023). With this therapy, MRIs of the abdomen (liver protocol) performed on 04/13/2023 and 11/03/2023 (both summarized above) showed no visible signs of a liver mass at all. Meanwhile, with the therapy she has received to date, her serum AFP level has declined from > 1000 ng/mL in late October 2023 to solidly WNL as of late January 2023 (which remains the case on the most recent recheck, 7.07 ng/mL on 02/21/2024; today's repeat result is pending), consistent with a complete response in her disease. I therefore had a long discussion with the patient and her sister in Spring 2023 regarding our treatment recommendations from that point. In short, the potential risks of any additional cycles of Avastin (particularly bleeding; she has an aortic aneurysm in addition to her baseline cirrhosis) likely outweighed, and still outweighs, its marginal, potential benefit (at least at this time); however, continuing indefinite, [id number] atezolizumab continues to be recommended (as the current standard of care; it appears to have worked extremely well). She has now completed a total of eighteen (18) cycles (with the eighteenth and, to date, most recent one given in early November 2023). At that time, it was her preference that we place this therapy on indefinite hold, as she was, and remains, convinced the Lord has healed her again (this time of her HCC); and she believed that the indefinite atezolizumab was causing her chronic, quality-of-life limiting fatigue (and this symptom has been getting noticeably better since we started holding the atezolizumab). She remains fully aware of the potential risks of discontinuing this medication. The most recent repeat MRI of the abdomen, performed on 05/14/2024 (and summarized above) now shows some early signs of possible, relapsing disease, with two arterial phase enhancing lesions  "immediately adjacent to each other localizing to segment 7/8 now visible again. Despite this, she still prefers to continue with expectant monitoring only; however, she is agreeable to a shorter interval follow up scan this time. We will therefore see her back in clinic in two months (~mid-July) with a CBC, CMP and repeat MRI of the abdomen with and without contrast.  Cirrhosis: Likely secondary to a longstanding history of both issue #3 (which was diagnosed and reportedly cured in ~2018, probably decades after she initially contracted it through her ) and alcohol abuse (she drank \"a lot\" of beer every day for ~twenty years but quit in the ). Currently still compensated. Ongoing management per gastroenterology/hepatology.  Hepatitis C: Reportedly contracted through her  (who ultimately  from it) without her knowledge, but also reportedly diagnosed and cured with a months-long course of antivirals in ~2018. Ongoing management per gastroenterology/hepatology.  Protein calorie malnutrition: Recently still much improved. Continue Marinol 2.5 mg PO BID. Continue to monitor.  Gallbladder issues: Two separate surgical evaluations have led to a cholecystectomy NOT being recommended, as, with her underlying cirrhosis, she has been deemed to be too high of a risk for this procedure.  Aortic aneurysm: Ongoing monitoring per CT/vascular surgery.  The patient was in agreement with these plans.    It is a pleasure to participate in Ms. Brooks's care. Please do not hesitate to call with any questions or concerns that you may have.    A total of 30 minutes were spent coordinating this patient’s care in clinic today; more than 50% of this time was face-to-face with the patient, reviewing her interim medical history, discussing the results of last week's repeat MRI of the abdomen and counseling on the current followup plan. All questions were answered to her satisfaction.    FOLLOW UP  Continue to defer any " additional cycles of [id number] atezolizumab (at least for now), per patient preference. Return to our clinic in 2 months (~mid-July) with a CBC, CMP, TSH, AFP and repeat MRI of the abdomen with and without contrast.            This document was electronically signed by NADEGE Boss MD May 21, 2024 16:53 EDT      CC: DO Henri Davis MD Hao Zhonglin, MD Erika G. Almodovar, MD John H. Chaney, MD

## 2024-05-21 NOTE — PROGRESS NOTES
Venipuncture Blood Specimen Collection  Venipuncture performed in right arm by Ana Montoya MA with good hemostasis. Patient tolerated the procedure well without complications.   05/21/24   Ana Montoya MA

## 2024-05-22 LAB — ALPHA-FETOPROTEIN: 9.44 NG/ML (ref 0–8.3)

## 2024-06-17 ENCOUNTER — TELEPHONE (OUTPATIENT)
Dept: ONCOLOGY | Facility: CLINIC | Age: 64
End: 2024-06-17
Payer: MEDICARE

## 2024-06-17 ENCOUNTER — HOSPITAL ENCOUNTER (EMERGENCY)
Facility: HOSPITAL | Age: 64
Discharge: HOME | End: 2024-06-17
Attending: STUDENT IN AN ORGANIZED HEALTH CARE EDUCATION/TRAINING PROGRAM | Admitting: STUDENT IN AN ORGANIZED HEALTH CARE EDUCATION/TRAINING PROGRAM
Payer: MEDICARE

## 2024-06-17 ENCOUNTER — APPOINTMENT (OUTPATIENT)
Dept: CT IMAGING | Facility: HOSPITAL | Age: 64
End: 2024-06-17
Payer: MEDICARE

## 2024-06-17 ENCOUNTER — APPOINTMENT (OUTPATIENT)
Dept: ULTRASOUND IMAGING | Facility: HOSPITAL | Age: 64
End: 2024-06-17
Payer: MEDICARE

## 2024-06-17 VITALS
DIASTOLIC BLOOD PRESSURE: 91 MMHG | OXYGEN SATURATION: 92 % | RESPIRATION RATE: 16 BRPM | SYSTOLIC BLOOD PRESSURE: 141 MMHG | HEIGHT: 67 IN | WEIGHT: 140.21 LBS | HEART RATE: 86 BPM | BODY MASS INDEX: 22.01 KG/M2 | TEMPERATURE: 97.6 F

## 2024-06-17 DIAGNOSIS — K80.20 CALCULUS OF GALLBLADDER WITHOUT CHOLECYSTITIS WITHOUT OBSTRUCTION: Primary | ICD-10-CM

## 2024-06-17 LAB
ALBUMIN SERPL-MCNC: 3.8 G/DL (ref 3.5–5.2)
ALBUMIN/GLOB SERPL: 1.1 G/DL
ALP SERPL-CCNC: 111 U/L (ref 39–117)
ALT SERPL W P-5'-P-CCNC: 31 U/L (ref 1–33)
ANION GAP SERPL CALCULATED.3IONS-SCNC: 11.6 MMOL/L (ref 5–15)
AST SERPL-CCNC: 47 U/L (ref 1–32)
BASOPHILS # BLD AUTO: 0.06 10*3/MM3 (ref 0–0.2)
BASOPHILS NFR BLD AUTO: 0.8 % (ref 0–1.5)
BILIRUB SERPL-MCNC: 1.6 MG/DL (ref 0–1.2)
BILIRUB UR QL STRIP: NEGATIVE
BUN SERPL-MCNC: 5 MG/DL (ref 8–23)
BUN/CREAT SERPL: 7.4 (ref 7–25)
CALCIUM SPEC-SCNC: 9.4 MG/DL (ref 8.6–10.5)
CHLORIDE SERPL-SCNC: 105 MMOL/L (ref 98–107)
CLARITY UR: CLEAR
CO2 SERPL-SCNC: 24.4 MMOL/L (ref 22–29)
COLOR UR: YELLOW
CREAT SERPL-MCNC: 0.68 MG/DL (ref 0.57–1)
D-LACTATE SERPL-SCNC: 1 MMOL/L (ref 0.5–2)
DEPRECATED RDW RBC AUTO: 49.1 FL (ref 37–54)
EGFRCR SERPLBLD CKD-EPI 2021: 96.2 ML/MIN/1.73
EOSINOPHIL # BLD AUTO: 0.37 10*3/MM3 (ref 0–0.4)
EOSINOPHIL NFR BLD AUTO: 5 % (ref 0.3–6.2)
ERYTHROCYTE [DISTWIDTH] IN BLOOD BY AUTOMATED COUNT: 13.3 % (ref 12.3–15.4)
GLOBULIN UR ELPH-MCNC: 3.6 GM/DL
GLUCOSE SERPL-MCNC: 93 MG/DL (ref 65–99)
GLUCOSE UR STRIP-MCNC: NEGATIVE MG/DL
HCT VFR BLD AUTO: 44.9 % (ref 34–46.6)
HGB BLD-MCNC: 15.2 G/DL (ref 12–15.9)
HGB UR QL STRIP.AUTO: NEGATIVE
HOLD SPECIMEN: NORMAL
HOLD SPECIMEN: NORMAL
IMM GRANULOCYTES # BLD AUTO: 0.03 10*3/MM3 (ref 0–0.05)
IMM GRANULOCYTES NFR BLD AUTO: 0.4 % (ref 0–0.5)
INR PPP: 1.1 (ref 0.9–1.1)
KETONES UR QL STRIP: NEGATIVE
LEUKOCYTE ESTERASE UR QL STRIP.AUTO: NEGATIVE
LIPASE SERPL-CCNC: 23 U/L (ref 13–60)
LYMPHOCYTES # BLD AUTO: 1.77 10*3/MM3 (ref 0.7–3.1)
LYMPHOCYTES NFR BLD AUTO: 24 % (ref 19.6–45.3)
MCH RBC QN AUTO: 33.4 PG (ref 26.6–33)
MCHC RBC AUTO-ENTMCNC: 33.9 G/DL (ref 31.5–35.7)
MCV RBC AUTO: 98.7 FL (ref 79–97)
MONOCYTES # BLD AUTO: 1.07 10*3/MM3 (ref 0.1–0.9)
MONOCYTES NFR BLD AUTO: 14.5 % (ref 5–12)
NEUTROPHILS NFR BLD AUTO: 4.09 10*3/MM3 (ref 1.7–7)
NEUTROPHILS NFR BLD AUTO: 55.3 % (ref 42.7–76)
NITRITE UR QL STRIP: NEGATIVE
NRBC BLD AUTO-RTO: 0 /100 WBC (ref 0–0.2)
PH UR STRIP.AUTO: 7 [PH] (ref 5–8)
PLATELET # BLD AUTO: 148 10*3/MM3 (ref 140–450)
PMV BLD AUTO: 9.5 FL (ref 6–12)
POTASSIUM SERPL-SCNC: 3.3 MMOL/L (ref 3.5–5.2)
PROT SERPL-MCNC: 7.4 G/DL (ref 6–8.5)
PROT UR QL STRIP: NEGATIVE
PROTHROMBIN TIME: 14.3 SECONDS (ref 12.1–14.7)
RBC # BLD AUTO: 4.55 10*6/MM3 (ref 3.77–5.28)
SODIUM SERPL-SCNC: 141 MMOL/L (ref 136–145)
SP GR UR STRIP: <=1.005 (ref 1–1.03)
UROBILINOGEN UR QL STRIP: NORMAL
WBC NRBC COR # BLD AUTO: 7.39 10*3/MM3 (ref 3.4–10.8)
WHOLE BLOOD HOLD COAG: NORMAL
WHOLE BLOOD HOLD SPECIMEN: NORMAL

## 2024-06-17 PROCEDURE — 85025 COMPLETE CBC W/AUTO DIFF WBC: CPT | Performed by: STUDENT IN AN ORGANIZED HEALTH CARE EDUCATION/TRAINING PROGRAM

## 2024-06-17 PROCEDURE — 96374 THER/PROPH/DIAG INJ IV PUSH: CPT

## 2024-06-17 PROCEDURE — 76705 ECHO EXAM OF ABDOMEN: CPT | Performed by: RADIOLOGY

## 2024-06-17 PROCEDURE — 74176 CT ABD & PELVIS W/O CONTRAST: CPT

## 2024-06-17 PROCEDURE — 74176 CT ABD & PELVIS W/O CONTRAST: CPT | Performed by: RADIOLOGY

## 2024-06-17 PROCEDURE — 99284 EMERGENCY DEPT VISIT MOD MDM: CPT

## 2024-06-17 PROCEDURE — 85610 PROTHROMBIN TIME: CPT | Performed by: STUDENT IN AN ORGANIZED HEALTH CARE EDUCATION/TRAINING PROGRAM

## 2024-06-17 PROCEDURE — 76705 ECHO EXAM OF ABDOMEN: CPT

## 2024-06-17 PROCEDURE — 83605 ASSAY OF LACTIC ACID: CPT | Performed by: STUDENT IN AN ORGANIZED HEALTH CARE EDUCATION/TRAINING PROGRAM

## 2024-06-17 PROCEDURE — 36415 COLL VENOUS BLD VENIPUNCTURE: CPT

## 2024-06-17 PROCEDURE — 25010000002 KETOROLAC TROMETHAMINE PER 15 MG: Performed by: STUDENT IN AN ORGANIZED HEALTH CARE EDUCATION/TRAINING PROGRAM

## 2024-06-17 PROCEDURE — 80053 COMPREHEN METABOLIC PANEL: CPT | Performed by: STUDENT IN AN ORGANIZED HEALTH CARE EDUCATION/TRAINING PROGRAM

## 2024-06-17 PROCEDURE — 83690 ASSAY OF LIPASE: CPT | Performed by: STUDENT IN AN ORGANIZED HEALTH CARE EDUCATION/TRAINING PROGRAM

## 2024-06-17 PROCEDURE — 81003 URINALYSIS AUTO W/O SCOPE: CPT | Performed by: STUDENT IN AN ORGANIZED HEALTH CARE EDUCATION/TRAINING PROGRAM

## 2024-06-17 RX ORDER — KETOROLAC TROMETHAMINE 30 MG/ML
30 INJECTION, SOLUTION INTRAMUSCULAR; INTRAVENOUS ONCE
Status: DISCONTINUED | OUTPATIENT
Start: 2024-06-17 | End: 2024-06-17

## 2024-06-17 RX ORDER — SODIUM CHLORIDE 0.9 % (FLUSH) 0.9 %
10 SYRINGE (ML) INJECTION AS NEEDED
Status: DISCONTINUED | OUTPATIENT
Start: 2024-06-17 | End: 2024-06-17 | Stop reason: HOSPADM

## 2024-06-17 RX ORDER — DICYCLOMINE HYDROCHLORIDE 10 MG/1
20 CAPSULE ORAL ONCE
Status: COMPLETED | OUTPATIENT
Start: 2024-06-17 | End: 2024-06-17

## 2024-06-17 RX ORDER — KETOROLAC TROMETHAMINE 30 MG/ML
15 INJECTION, SOLUTION INTRAMUSCULAR; INTRAVENOUS ONCE
Status: COMPLETED | OUTPATIENT
Start: 2024-06-17 | End: 2024-06-17

## 2024-06-17 RX ADMIN — KETOROLAC TROMETHAMINE 15 MG: 30 INJECTION, SOLUTION INTRAMUSCULAR; INTRAVENOUS at 13:26

## 2024-06-17 RX ADMIN — DICYCLOMINE HYDROCHLORIDE 20 MG: 10 CAPSULE ORAL at 13:26

## 2024-06-17 NOTE — ED PROVIDER NOTES
Subjective   History of Present Illness  64-year-old female with past medical history of cirrhosis, hypothyroidism, hypertension, and hyperlipidemia presents to the ER due to concerns for epigastric and right upper quadrant abdominal pain.  Patient confirmed a history of cholelithiasis.  Patient noted symptoms started approximately 1 day prior to this presentation.  No obvious alleviating factors.  No fever or chills.  No nausea or vomiting.  No chest pain or shortness of breath.  No obvious food triggers.  Vital stable.  Afebrile      Review of Systems   Gastrointestinal:  Positive for abdominal pain.   All other systems reviewed and are negative.      Past Medical History:   Diagnosis Date    Acid reflux     Aortic aneurysm     Cancer     LIVER, UTERINE    Cirrhosis     COPD (chronic obstructive pulmonary disease)     Depression     Disease of thyroid gland     Elevated cholesterol     High cholesterol     History of transfusion     Hypertension     Infectious viral hepatitis     2019    Osteoporosis     Ovarian cancer        Allergies   Allergen Reactions    Codeine GI Intolerance       Past Surgical History:   Procedure Laterality Date    APPENDECTOMY N/A     COLONOSCOPY      ENDOSCOPY N/A 8/8/2023    Procedure: ESOPHAGOGASTRODUODENOSCOPY WITH BIOPSY;  Surgeon: Rahel Sesay MD;  Location: [facility];  Service: Gastroenterology;  Laterality: N/A;    FRACTURE SURGERY Left     ARM, HAS 2 SCREWS    HYSTERECTOMY      UPPER GASTROINTESTINAL ENDOSCOPY         Family History   Problem Relation Age of Onset    Stroke Mother     Hypertension Mother     COPD Mother     Throat cancer Mother     Arthritis Mother     Asthma Mother     Heart attack Mother     Stroke Father     Cancer Father     Liver cancer Father        Social History     Socioeconomic History    Marital status:     Number of children: 3   Tobacco Use    Smoking status: Former     Current packs/day: 0.00     Average packs/day: 1.5  packs/day for 25.0 years (37.5 ttl pk-yrs)     Types: Cigarettes     Start date: 1990     Quit date: 2015     Years since quittin.1    Smokeless tobacco: Never    Tobacco comments:     smoked for approx 20 years, 1.5 to 2 ppd   Vaping Use    Vaping status: Never Used   Substance and Sexual Activity    Alcohol use: No     Comment: she previously drank daily for 16 years, beer    Drug use: No    Sexual activity: Defer           Objective   Physical Exam  Constitutional:       General: She is not in acute distress.     Appearance: Normal appearance. She is not ill-appearing.   HENT:      Head: Normocephalic and atraumatic.      Right Ear: External ear normal.      Left Ear: External ear normal.      Nose: Nose normal.      Mouth/Throat:      Mouth: Mucous membranes are moist.   Eyes:      Extraocular Movements: Extraocular movements intact.      Pupils: Pupils are equal, round, and reactive to light.   Cardiovascular:      Rate and Rhythm: Normal rate and regular rhythm.      Heart sounds: No murmur heard.  Pulmonary:      Effort: Pulmonary effort is normal. No respiratory distress.      Breath sounds: Normal breath sounds. No wheezing.   Abdominal:      General: Bowel sounds are normal.      Palpations: Abdomen is soft.      Tenderness: There is abdominal tenderness in the right upper quadrant and epigastric area.   Musculoskeletal:         General: No deformity or signs of injury. Normal range of motion.      Cervical back: Normal range of motion and neck supple.   Skin:     General: Skin is warm and dry.      Findings: No erythema.   Neurological:      General: No focal deficit present.      Mental Status: She is alert and oriented to person, place, and time. Mental status is at baseline.      Cranial Nerves: No cranial nerve deficit.   Psychiatric:         Mood and Affect: Mood normal.         Behavior: Behavior normal.         Thought Content: Thought content normal.         Procedures           ED  Course                                 CT Abdomen Pelvis Without Contrast    Result Date: 6/17/2024  1.  Advanced liver cirrhosis. 2.  Mild splenomegaly. 3.  Prominent portosystemic collateral vessels compatible with portal hypertension. No ascites. 4.  Cholelithiasis. 5.  Mild constipation. No bowel obstruction. 6.  Prior appendectomy and hysterectomy. Other incidental/nonacute findings above.   This report was finalized on 6/17/2024 12:18 PM by Dr. Jerry Avalos MD.      US Gallbladder    Result Date: 6/17/2024  1.  Cholelithiasis. 2.  No biliary dilatation identified.   This report was finalized on 6/17/2024 12:10 PM by Dr. Jerry Avalos MD.         Results for orders placed or performed during the hospital encounter of 06/17/24   Comprehensive Metabolic Panel    Specimen: Arm, Right; Blood   Result Value Ref Range    Glucose 93 65 - 99 mg/dL    BUN 5 (L) 8 - 23 mg/dL    Creatinine 0.68 0.57 - 1.00 mg/dL    Sodium 141 136 - 145 mmol/L    Potassium 3.3 (L) 3.5 - 5.2 mmol/L    Chloride 105 98 - 107 mmol/L    CO2 24.4 22.0 - 29.0 mmol/L    Calcium 9.4 8.6 - 10.5 mg/dL    Total Protein 7.4 6.0 - 8.5 g/dL    Albumin 3.8 3.5 - 5.2 g/dL    ALT (SGPT) 31 1 - 33 U/L    AST (SGOT) 47 (H) 1 - 32 U/L    Alkaline Phosphatase 111 39 - 117 U/L    Total Bilirubin 1.6 (H) 0.0 - 1.2 mg/dL    Globulin 3.6 gm/dL    A/G Ratio 1.1 g/dL    BUN/Creatinine Ratio 7.4 7.0 - 25.0    Anion Gap 11.6 5.0 - 15.0 mmol/L    eGFR 96.2 >60.0 mL/min/1.73   Lipase    Specimen: Arm, Right; Blood   Result Value Ref Range    Lipase 23 13 - 60 U/L   Urinalysis With Microscopic If Indicated (No Culture) - Urine, Clean Catch    Specimen: Urine, Clean Catch   Result Value Ref Range    Color, UA Yellow Yellow, Straw    Appearance, UA Clear Clear    pH, UA 7.0 5.0 - 8.0    Specific Gravity, UA <=1.005 1.005 - 1.030    Glucose, UA Negative Negative    Ketones, UA Negative Negative    Bilirubin, UA Negative Negative    Blood, UA Negative Negative    Protein,  UA Negative Negative    Leuk Esterase, UA Negative Negative    Nitrite, UA Negative Negative    Urobilinogen, UA 0.2 E.U./dL 0.2 - 1.0 E.U./dL   Lactic Acid, Plasma    Specimen: Arm, Right; Blood   Result Value Ref Range    Lactate 1.0 0.5 - 2.0 mmol/L   Protime-INR    Specimen: Arm, Right; Blood   Result Value Ref Range    Protime 14.3 12.1 - 14.7 Seconds    INR 1.10 0.90 - 1.10   CBC Auto Differential    Specimen: Arm, Right; Blood   Result Value Ref Range    WBC 7.39 3.40 - 10.80 10*3/mm3    RBC 4.55 3.77 - 5.28 10*6/mm3    Hemoglobin 15.2 12.0 - 15.9 g/dL    Hematocrit 44.9 34.0 - 46.6 %    MCV 98.7 (H) 79.0 - 97.0 fL    MCH 33.4 (H) 26.6 - 33.0 pg    MCHC 33.9 31.5 - 35.7 g/dL    RDW 13.3 12.3 - 15.4 %    RDW-SD 49.1 37.0 - 54.0 fl    MPV 9.5 6.0 - 12.0 fL    Platelets 148 140 - 450 10*3/mm3    Neutrophil % 55.3 42.7 - 76.0 %    Lymphocyte % 24.0 19.6 - 45.3 %    Monocyte % 14.5 (H) 5.0 - 12.0 %    Eosinophil % 5.0 0.3 - 6.2 %    Basophil % 0.8 0.0 - 1.5 %    Immature Grans % 0.4 0.0 - 0.5 %    Neutrophils, Absolute 4.09 1.70 - 7.00 10*3/mm3    Lymphocytes, Absolute 1.77 0.70 - 3.10 10*3/mm3    Monocytes, Absolute 1.07 (H) 0.10 - 0.90 10*3/mm3    Eosinophils, Absolute 0.37 0.00 - 0.40 10*3/mm3    Basophils, Absolute 0.06 0.00 - 0.20 10*3/mm3    Immature Grans, Absolute 0.03 0.00 - 0.05 10*3/mm3    nRBC 0.0 0.0 - 0.2 /100 WBC   Green Top (Gel)   Result Value Ref Range    Extra Tube Hold for add-ons.    Lavender Top   Result Value Ref Range    Extra Tube hold for add-on    Gold Top - SST   Result Value Ref Range    Extra Tube Hold for add-ons.    Light Blue Top   Result Value Ref Range    Extra Tube Hold for add-ons.                  Medical Decision Making  CBC and CMP unremarkable.  Right upper quadrant ultrasound confirmed cholelithiasis.  CT imaging of the abdomen pelvis noted chronic changes consistent with the patient cirrhosis.  No acute abnormalities noted.  I counseled the patient on the imaging results  and recommended outpatient follow-up with a local surgeon for further treatment and possible surgical consideration.  Work up and results were discussed throughly with the patient.  The patient will be discharged for further monitoring and management with their PCP.  Red flags, warning signs, worsening symptoms, and when to return to the ER discussed with and understood by the patient.  Patient will follow up with their PCP in a timely manner.  Vitals stable at discharge.    Amount and/or Complexity of Data Reviewed  Labs: ordered. Decision-making details documented in ED Course.  Radiology: ordered. Decision-making details documented in ED Course.    Risk  Prescription drug management.        Final diagnoses:   Calculus of gallbladder without cholecystitis without obstruction       ED Disposition  ED Disposition       ED Disposition   Discharge    Condition   Stable    Comment   --               Haley Bailey,   [address] [facility] [address]  [phone]    In 1 week      [facility] EMERGENCY DEPARTMENT  [address] [place] [id number] [phone]    If symptoms worsen         Medication List      No changes were made to your prescriptions during this visit.            Lakhwinder Camacho DO  06/17/24 1330

## 2024-06-17 NOTE — TELEPHONE ENCOUNTER
RN spoke with patient about her symptoms, she states she has pain in her chest and has been in bed since Thursday with vomiting and a fever. RN consulted with Dr Boss who suggested that the patient go to the ED for evaluation. RN informed the patient of MD recommendation and she is agreeable to this plan. No other questions or concerns at this time.

## 2024-06-17 NOTE — TELEPHONE ENCOUNTER
PT CALLED STATING SHE HAS V/D. PT STATES SHE IS IN PAIN IN HER ABDOMEN AREA, PT STATES SHE HAS PAIN IN HER CHEST, STATES SHE HAD A FEVER SINCE THURSDAY 6/14, PT STATES SHE BELIEVES IT IS HER GALLBLADDER. PT STATES SHE HAS BEEN UNABLE TO EAT OR DRINK BUT WAS ABLE TO DRINK COFFEE THIS MORNING AND WILL BE ATTEMPTING GRAVY AND BISCUITS.

## 2024-06-25 ENCOUNTER — TELEPHONE (OUTPATIENT)
Dept: UROLOGY | Facility: CLINIC | Age: 64
End: 2024-06-25
Payer: MEDICARE

## 2024-06-25 NOTE — TELEPHONE ENCOUNTER
Patient called because she would like to come in for an earlier appointment to see Dr. Montgomery.  She wants to have her gallbladder checked our sooner. She would like a call back concerning this.

## 2024-06-25 NOTE — TELEPHONE ENCOUNTER
Tried to call patient but did not reach and I left her a message that we don't treat the Gallbladder that she could reach out to her PCP and asked them to order a US or HIDA scan and if it is abnormal, she will need to see a general surgeon. I also let her know that Dr. Montgomery, does not have any sooner appointments for her to see her sooner. However, I could give her a sooner one with Naina.

## 2024-08-05 ENCOUNTER — HOSPITAL ENCOUNTER (OUTPATIENT)
Dept: MRI IMAGING | Facility: HOSPITAL | Age: 64
Discharge: HOME | End: 2024-08-05
Admitting: INTERNAL MEDICINE
Payer: MEDICARE

## 2024-08-05 DIAGNOSIS — Z79.899 LONG-TERM USE OF HIGH-RISK MEDICATION: ICD-10-CM

## 2024-08-05 DIAGNOSIS — C22.0 HEPATOCELLULAR CARCINOMA: ICD-10-CM

## 2024-08-05 LAB — CREAT BLDA-MCNC: 0.7 MG/DL (ref 0.6–1.3)

## 2024-08-05 PROCEDURE — 74183 MRI ABD W/O CNTR FLWD CNTR: CPT

## 2024-08-05 PROCEDURE — A9577 INJ MULTIHANCE: HCPCS | Performed by: INTERNAL MEDICINE

## 2024-08-05 PROCEDURE — 82565 ASSAY OF CREATININE: CPT

## 2024-08-05 PROCEDURE — 74183 MRI ABD W/O CNTR FLWD CNTR: CPT | Performed by: RADIOLOGY

## 2024-08-05 PROCEDURE — 0 GADOBENATE DIMEGLUMINE 529 MG/ML SOLUTION: Performed by: INTERNAL MEDICINE

## 2024-08-05 RX ADMIN — GADOBENATE DIMEGLUMINE 12 ML: 529 INJECTION, SOLUTION INTRAVENOUS at 11:03

## 2024-08-14 ENCOUNTER — LAB (OUTPATIENT)
Dept: ONCOLOGY | Facility: CLINIC | Age: 64
End: 2024-08-14
Payer: MEDICARE

## 2024-08-14 ENCOUNTER — OFFICE VISIT (OUTPATIENT)
Dept: ONCOLOGY | Facility: CLINIC | Age: 64
End: 2024-08-14
Payer: MEDICARE

## 2024-08-14 ENCOUNTER — TELEPHONE (OUTPATIENT)
Dept: CARDIAC SURGERY | Facility: CLINIC | Age: 64
End: 2024-08-14
Payer: MEDICARE

## 2024-08-14 VITALS
BODY MASS INDEX: 20.42 KG/M2 | SYSTOLIC BLOOD PRESSURE: 130 MMHG | TEMPERATURE: 97.3 F | WEIGHT: 130.4 LBS | RESPIRATION RATE: 18 BRPM | HEART RATE: 87 BPM | DIASTOLIC BLOOD PRESSURE: 76 MMHG | OXYGEN SATURATION: 95 %

## 2024-08-14 DIAGNOSIS — C22.0 HEPATOCELLULAR CARCINOMA: Primary | ICD-10-CM

## 2024-08-14 DIAGNOSIS — Z79.899 LONG-TERM USE OF HIGH-RISK MEDICATION: ICD-10-CM

## 2024-08-14 DIAGNOSIS — C22.0 HEPATOCELLULAR CARCINOMA: ICD-10-CM

## 2024-08-14 LAB
ALBUMIN SERPL-MCNC: 3.5 G/DL (ref 3.5–5.2)
ALBUMIN/GLOB SERPL: 1.1 G/DL
ALP SERPL-CCNC: 99 U/L (ref 39–117)
ALT SERPL W P-5'-P-CCNC: 32 U/L (ref 1–33)
ANION GAP SERPL CALCULATED.3IONS-SCNC: 11.3 MMOL/L (ref 5–15)
AST SERPL-CCNC: 47 U/L (ref 1–32)
BASOPHILS # BLD AUTO: 0.07 10*3/MM3 (ref 0–0.2)
BASOPHILS NFR BLD AUTO: 1 % (ref 0–1.5)
BILIRUB SERPL-MCNC: 0.6 MG/DL (ref 0–1.2)
BUN SERPL-MCNC: 6 MG/DL (ref 8–23)
BUN/CREAT SERPL: 9 (ref 7–25)
CALCIUM SPEC-SCNC: 9.3 MG/DL (ref 8.6–10.5)
CHLORIDE SERPL-SCNC: 109 MMOL/L (ref 98–107)
CO2 SERPL-SCNC: 21.7 MMOL/L (ref 22–29)
CREAT SERPL-MCNC: 0.67 MG/DL (ref 0.57–1)
DEPRECATED RDW RBC AUTO: 52.6 FL (ref 37–54)
EGFRCR SERPLBLD CKD-EPI 2021: 96.5 ML/MIN/1.73
EOSINOPHIL # BLD AUTO: 0.36 10*3/MM3 (ref 0–0.4)
EOSINOPHIL NFR BLD AUTO: 5.4 % (ref 0.3–6.2)
ERYTHROCYTE [DISTWIDTH] IN BLOOD BY AUTOMATED COUNT: 13.8 % (ref 12.3–15.4)
GLOBULIN UR ELPH-MCNC: 3.2 GM/DL
GLUCOSE SERPL-MCNC: 85 MG/DL (ref 65–99)
HCT VFR BLD AUTO: 42.2 % (ref 34–46.6)
HGB BLD-MCNC: 13.9 G/DL (ref 12–15.9)
IMM GRANULOCYTES # BLD AUTO: 0.02 10*3/MM3 (ref 0–0.05)
IMM GRANULOCYTES NFR BLD AUTO: 0.3 % (ref 0–0.5)
LYMPHOCYTES # BLD AUTO: 1.64 10*3/MM3 (ref 0.7–3.1)
LYMPHOCYTES NFR BLD AUTO: 24.6 % (ref 19.6–45.3)
MCH RBC QN AUTO: 33.8 PG (ref 26.6–33)
MCHC RBC AUTO-ENTMCNC: 32.9 G/DL (ref 31.5–35.7)
MCV RBC AUTO: 102.7 FL (ref 79–97)
MONOCYTES # BLD AUTO: 0.76 10*3/MM3 (ref 0.1–0.9)
MONOCYTES NFR BLD AUTO: 11.4 % (ref 5–12)
NEUTROPHILS NFR BLD AUTO: 3.83 10*3/MM3 (ref 1.7–7)
NEUTROPHILS NFR BLD AUTO: 57.3 % (ref 42.7–76)
NRBC BLD AUTO-RTO: 0 /100 WBC (ref 0–0.2)
PLATELET # BLD AUTO: 170 10*3/MM3 (ref 140–450)
PMV BLD AUTO: 9.9 FL (ref 6–12)
POTASSIUM SERPL-SCNC: 3.6 MMOL/L (ref 3.5–5.2)
PROT SERPL-MCNC: 6.7 G/DL (ref 6–8.5)
RBC # BLD AUTO: 4.11 10*6/MM3 (ref 3.77–5.28)
SODIUM SERPL-SCNC: 142 MMOL/L (ref 136–145)
T4 FREE SERPL-MCNC: 1.03 NG/DL (ref 0.92–1.68)
TSH SERPL DL<=0.05 MIU/L-ACNC: 2.86 UIU/ML (ref 0.27–4.2)
WBC NRBC COR # BLD AUTO: 6.68 10*3/MM3 (ref 3.4–10.8)

## 2024-08-14 PROCEDURE — 84443 ASSAY THYROID STIM HORMONE: CPT | Performed by: INTERNAL MEDICINE

## 2024-08-14 PROCEDURE — 84439 ASSAY OF FREE THYROXINE: CPT | Performed by: INTERNAL MEDICINE

## 2024-08-14 PROCEDURE — 82105 ALPHA-FETOPROTEIN SERUM: CPT | Performed by: INTERNAL MEDICINE

## 2024-08-14 PROCEDURE — 99214 OFFICE O/P EST MOD 30 MIN: CPT | Performed by: INTERNAL MEDICINE

## 2024-08-14 PROCEDURE — 85025 COMPLETE CBC W/AUTO DIFF WBC: CPT | Performed by: INTERNAL MEDICINE

## 2024-08-14 PROCEDURE — 80053 COMPREHEN METABOLIC PANEL: CPT | Performed by: INTERNAL MEDICINE

## 2024-08-14 PROCEDURE — G2211 COMPLEX E/M VISIT ADD ON: HCPCS | Performed by: INTERNAL MEDICINE

## 2024-08-14 PROCEDURE — 1126F AMNT PAIN NOTED NONE PRSNT: CPT | Performed by: INTERNAL MEDICINE

## 2024-08-14 RX ORDER — OMEPRAZOLE 20 MG/1
CAPSULE, DELAYED RELEASE ORAL
COMMUNITY
Start: 2024-04-22

## 2024-08-14 RX ORDER — FLUTICASONE FUROATE, UMECLIDINIUM BROMIDE AND VILANTEROL TRIFENATATE 200; 62.5; 25 UG/1; UG/1; UG/1
POWDER RESPIRATORY (INHALATION)
COMMUNITY
Start: 2024-06-14

## 2024-08-14 NOTE — PROGRESS NOTES
Name:  Noemy Brooks  :  1960  Date:  2024     REFERRING PHYSICIAN  Henri Melvin MD    PRIMARY CARE PHYSICIAN  Lynn, Haley Roca DO    REASON FOR FOLLOWUP  1. Hepatocellular carcinoma      CHIEF COMPLAINT  None.    Dear Dr. Bailey,    HISTORY OF PRESENT ILLNESS:   I saw Ms. Brooks in followup today in our medical oncology clinic. As you are aware, she is a pleasant, 64 y.o., white female with a history of hypertension, hepatitis C (treated with antivirals and reportedly cured in ~), cirrhosis and hepatocellular carcinoma who was initially diagnosed with the latter in ~2022. [place], [place]. Her [facility] gastroenterologist referred her to  at that time once she was found to have a couple of liver masses and a serum AFP > 1,000 ng/mL. She was evaluated by the liver transplant service; however, due to the HCC's involvement of the hepatic vessels, she was felt to not be a candidate (for transplant). She was subsequently evaluated by  medical oncology, who recommended starting first-line, palliative treatment with a combination of k0nussbf bevacizumab (Mvasi) and atezolizumab (Tecentriq), per the current standard of care. She received a total of eight (8) cycles between 2022 and early 2023 through the [facility]. Due to transportation issues (her sister has to be the one to drive her, and the trips to [place] have been getting increasingly difficult for her), she presented to our clinic in 2023 in order to transfer her ongoing, oncologic care to us, closer to her home in [place], [place].    INTERIM HISTORY:  Ms. Brooks returns to clinic today for follow up by herself. She tolerated [id number] atezolizumab overall well, and she has now received a total of eighteen (18) cycles to date (with the eighteenth and, to date, most recent one given on 2023). Per her preference, we have been holding this therapy ever since then. She previously stated that the Lord cured  her once before (of Hepatitis C), and she has recently become convinced that he has/will do it again (this time for her HCC). Since holding the immunotherapy, her chronic fatigue has remained improved. She continues to make an effort to stay as active as possible, and she once again has no new or other specific complaints.    Past Medical History:   Diagnosis Date    Acid reflux     Aortic aneurysm     Cancer     LIVER, UTERINE    Cirrhosis     COPD (chronic obstructive pulmonary disease)     Depression     Disease of thyroid gland     Elevated cholesterol     High cholesterol     History of transfusion     Hypertension     Infectious viral hepatitis         Osteoporosis     Ovarian cancer        Past Surgical History:   Procedure Laterality Date    APPENDECTOMY N/A     COLONOSCOPY      ENDOSCOPY N/A 2023    Procedure: ESOPHAGOGASTRODUODENOSCOPY WITH BIOPSY;  Surgeon: Rahel Sesay MD;  Location: [facility];  Service: Gastroenterology;  Laterality: N/A;    FRACTURE SURGERY Left     ARM, HAS 2 SCREWS    HYSTERECTOMY      UPPER GASTROINTESTINAL ENDOSCOPY         Social History     Socioeconomic History    Marital status:     Number of children: 3   Tobacco Use    Smoking status: Former     Current packs/day: 0.00     Average packs/day: 1.5 packs/day for 25.0 years (37.5 ttl pk-yrs)     Types: Cigarettes     Start date: 1990     Quit date: 2015     Years since quittin.2    Smokeless tobacco: Never    Tobacco comments:     smoked for approx 20 years, 1.5 to 2 ppd   Vaping Use    Vaping status: Never Used   Substance and Sexual Activity    Alcohol use: No     Comment: she previously drank daily for 16 years, beer    Drug use: No    Sexual activity: Defer       Family History   Problem Relation Age of Onset    Stroke Mother     Hypertension Mother     COPD Mother     Throat cancer Mother     Arthritis Mother     Asthma Mother     Heart attack Mother     Stroke Father     Cancer  Father     Liver cancer Father        Allergies   Allergen Reactions    Codeine GI Intolerance       Current Outpatient Medications   Medication Sig Dispense Refill    B Complex Vitamins (VITAMIN B COMPLEX PO) Take  by mouth.      budesonide-formoterol (SYMBICORT) 160-4.5 MCG/ACT inhaler Inhale 2 puffs 2 (Two) Times a Day.      cholecalciferol (VITAMIN D3) 25 MCG (1000 UT) tablet Take 1 tablet by mouth Daily.      levothyroxine (SYNTHROID, LEVOTHROID) 25 MCG tablet TAKE ONE (1) TABLET BY MOUTH EVERY MORNING. 30 tablet 5    lisinopril (PRINIVIL,ZESTRIL) 30 MG tablet Take 1 tablet by mouth Daily.      omeprazole (priLOSEC) 20 MG capsule       oxyCODONE (Roxicodone) 5 MG immediate release tablet Take 1 tablet by mouth Every 6 (Six) Hours As Needed for Severe Pain. 10 tablet 0    pantoprazole (PROTONIX) 40 MG EC tablet Take 1 tablet by mouth Daily. 30 tablet 0    traZODone (DESYREL) 100 MG tablet Take 1 tablet by mouth Every Night.      Trelegy Ellipta 200-62.5-25 MCG/ACT inhaler       albuterol sulfate  (90 Base) MCG/ACT inhaler Inhale 2 puffs Every 4 (Four) Hours As Needed for Wheezing. (Patient not taking: Reported on 8/14/2024)      Atezolizumab (TECENTRIQ IV) Infuse  into a venous catheter Every 21 (Twenty-One) Days. (Patient not taking: Reported on 8/14/2024)      cyclobenzaprine (FLEXERIL) 5 MG tablet Take 1 tablet by mouth 3 (Three) Times a Day As Needed for Muscle Spasms. (Patient not taking: Reported on 8/14/2024)      diphenhydrAMINE 12.5 MG/5ML elixir 20 mL, aluminum-magnesium hydroxide-simethicone 400-400-40 MG/5ML suspension 20 mL, Lidocaine Viscous HCl 2 % solution 20 mL Swish and spit 15 mL Every 4 (Four) Hours As Needed. (Patient not taking: Reported on 8/14/2024)      dronabinol (Marinol) 2.5 MG capsule Take 1 capsule by mouth 2 (Two) Times a Day Before Meals. (Patient not taking: Reported on 8/14/2024) 60 capsule 2    lisinopril-hydrochlorothiazide (PRINZIDE,ZESTORETIC) 20-12.5 MG per tablet  Take 1 tablet by mouth Daily. (Patient not taking: Reported on 2024)      Magnesium Hydroxide (MILK OF MAGNESIA PO) Take  by mouth. (Patient not taking: Reported on 2024)      multivitamin (MULTI VITAMIN DAILY PO) Take 1 tablet by mouth Daily. (Patient not taking: Reported on 2024)      ondansetron ODT (ZOFRAN-ODT) 4 MG disintegrating tablet Place 1 tablet on the tongue Every 6 (Six) Hours As Needed for Nausea or Vomiting. (Patient not taking: Reported on 2024) 12 tablet 0     No current facility-administered medications for this visit.     REVIEW OF SYSTEMS  CONSTITUTIONAL:  No fever, chills or night sweats. Improved fatigue, as per the HPI above.  EYES:  No blurry vision, diplopia or other vision changes.  ENT:  No hearing loss, nosebleeds or sore throat.  CARDIOVASCULAR:  No palpitations, arrhythmia, syncopal episodes or edema.  PULMONARY:  No hemoptysis, wheezing, chronic cough or shortness of breath.  GASTROINTESTINAL:  As per the HPI above.  GENITOURINARY:  No hematuria, kidney stones or frequent urination.  MUSCULOSKELETAL:  No joint or back pains.  INTEGUMENTARY: No rashes or pruritus.  ENDOCRINE:  No excessive thirst or hot flashes.  HEMATOLOGIC:  No history of free bleeding, spontaneous bleeding or clotting.  IMMUNOLOGIC:  No allergies or frequent infections.  NEUROLOGIC: No numbness, tingling, seizures or weakness.  PSYCHIATRIC:  No anxiety or depression.    PHYSICAL EXAMINATION  /76   Pulse 87   Temp 97.3 °F (36.3 °C) (Temporal)   Resp 18   Wt 59.1 kg (130 lb 6.4 oz)   SpO2 95%   BMI 20.42 kg/m²     Pain Score:  Pain Score    24 1420   PainSc: 0-No pain     PHQ-Score Total:  PHQ-9 Total Score:      ECO  GENERAL:  A well-developed, well-nourished, thin, white female in no acute distress.  HEENT:  Pupils equally round and reactive to light. Extraocular muscles intact.  CARDIOVASCULAR:  Regular rate and rhythm. No murmurs, gallops or rubs.  LUNGS:  Clear to  auscultation bilaterally.  ABDOMEN:  Soft, nontender, nondistended with positive bowel sounds.  EXTREMITIES:  No clubbing, cyanosis or edema bilaterally.  SKIN:  No rashes or petechiae.  NEURO:  Cranial nerves grossly intact. No focal deficits.  PSYCH:  Alert and oriented x3.    The physical exam is yet again unchanged from recent priors.    LABORATORY  Lab Results   Component Value Date    WBC 6.68 08/14/2024    HGB 13.9 08/14/2024    HCT 42.2 08/14/2024    .7 (H) 08/14/2024     08/14/2024    NEUTROABS 3.83 08/14/2024       Lab Results   Component Value Date     08/14/2024    K 3.6 08/14/2024     (H) 08/14/2024    CO2 21.7 (L) 08/14/2024    BUN 6 (L) 08/14/2024    CREATININE 0.67 08/14/2024    GLUCOSE 85 08/14/2024    CALCIUM 9.3 08/14/2024    AST 47 (H) 08/14/2024    ALT 32 08/14/2024    ALKPHOS 99 08/14/2024    BILITOT 0.6 08/14/2024    PROTEINTOT 6.7 08/14/2024    ALBUMIN 3.5 08/14/2024     CBC (08/14/2024): WBCs: 6.68; HgB: 13.9; Hct: 42.2; platelets: 170  CBC (05/21/2024): WBCs: 4.92; HgB: 13.9; Hct: 40.9; platelets: 126  CBC (02/21/2024): WBCs: 6.29; HgB: 14.3; Hct: 42.9; platelets: 150  CBC (12/19/2023): WBCs: 5.64; HgB: 14.5; Hct: 44.2; platelets: 120  CBC (10/19/2023): WBCs: 6.05; HgB: 14.4; Hct: 43.7; platelets: 119  CBC (08/16/2023): WBCs: 5.87; HgB: 14.3; Hct: 44.2; platelets: 120  CBC (05/24/2023): WBCs: 7.45; HgB: 15.8; Hct: 47.0; platelets: 125  CBC (05/01/2023): WBCs: 6.91; HgB: 15.9; Hct: 47.7; platelets: 118  CBC (04/03/2023): WBCs: 6.68; HgB: 15.9; Hct: 45.7; platelets: 116; MCV: 94    AFP (08/14/2024): pending  AFP (05/21/2024): 9.44 ng/mL  AFP (02/21/2024): 7.07 ng/mL  AFP (12/19/2023): 5.88 ng/mL  AFP (11/09/2023): 6.00 ng.mL  AFP (09/28/2023): 5.55 ng/mL  AFP (08/16/2023): 3.96 ng/mL  AFP (07/06/2023): 3.33 ng/mL  AFP (05/24/2023): 2.66 ng/mL  AFP (05/01/2023): < 2 ng/mL  AFP (01/27/2023): 2.6 ng/mL  AFP (10/31/2022): 1031.0 ng/mL    IMAGING  CT liver (10/21/2022, at  ):  Impression: LR 5 segment 7 lesion measuring up to 2.8 x 2.5 cm with associated TIV (tumor in vein). LR 3 legion measuring 8 mm in segment 4A.    CT chest, abdomen and pelvis with contrast (01/27/2023, at ):  Impression:  Chest: No convincing metastatic disease in the thorax.  Abdomen/Pelvis: Within the limits of the study, the area of washout representing the known HCC is stable to smaller in size. The component representing the tumor in vein is smaller in size. No new liver lesions. No distant metastatic disease.    MRI abdomen with and without contrast (04/13/2023):  Impression:  1) No solid arterial phase enhancing liver lesions identified. No delayed phase enhancing liver lesions are noted.  2) Liver cirrhosis and portal hypertension.  3) 0.6 cm simple appearing benign hepatic cyst right lobe.  4) Simple appearing cyst right kidney. No hydronephrosis.  5) Other nonacute findings.    MRI abdomen with and without contrast (11/03/2023, compared to 04/13/2023):  Impression: Little overall change. No contrast-enhancing hepatic lesions are identified.    MRI abdomen with and without contrast (05/14/2024, compared to MRI on 11/03/2023):  Impression:  1) Development of two arterial phase enhancing lesions immediately adjacent to each other localizing to segment 7/8 of the right hepatic lobe worrisome for primary HCC given extensive background changes of liver cirrhosis. Metastatic disease is also within the differential.  2) Stable simple appearing cyst right hepatic lobe.  3) Cholelithiasis.  4) Portal hypertension changes which include presence of splenorenal shunts.  5) Other incidental/nonacute findings.    CT abdomen and pelvis without contrast (06/17/2024, compared to 03/17/2024):  Impression:  1) Advanced liver cirrhosis.  2) Mild splenomegaly.  3) Prominent portosystemic collateral vessels compatible with portal hypertension. No ascites.  4) Cholelithiasis.  5) Mild constipation. No bowel  obstruction.  6) Prior appendectomy and hysterectomy. Other incidental/nonacute findings.    MRI abdomen with and without contrast (08/05/2024, compared to 05/14/2024):  Impression:  1) As noted previously, two, arterial phase enhancing lesions dome of liver segment 7/8 not significantly changed in configuration from the previous exam.  2) Larger lesion is 13.1 mm and was previously 11.7 mm but could be due to differences in technique.  3) The smaller lesion is 7.2 mm and was previously 6.9 mm.  4) Segmental type enhancement is noted in the caudate lobe which may be in part related that is approximately 2.63 cm. Diffusion restriction is noted raising suspicious for separate neoplastic lesion. Area was previously obscured by motion artifact and proximity of the duodenum.  5) Small faby hepatis region lymph nodes are noted one of which is 1.45 cm and a smaller node that is 0.81 cm and were previously too small to characterize.  6) Advanced liver cirrhosis, stable. Changes of portal hypertension are again noted and stable.  7) Cholelithiasis, stable.  8) Simple appearing cyst right lobe liver, stable.    PATHOLOGY    IMPRESSION AND PLAN  Ms. Brooks is a 64 y.o., white female with:  Hepatocellular carcinoma: Initially diagnosed in Fall 2022 after a CT of the liver (performed on 10/21/2022 at , summarized above) confirmed the presence of two lesions (one measuring 2.8 x 2.5 cm in segment 7 and a probable satellite measuring ~8 mm in segment 4A) in the setting of known, baseline cirrhosis and a serum AFP level of > 1000 ng/mL. Due to venous involvement by the tumor, she was/is not a candidate for a liver transplant.  medical oncology therefore recommended initiating first-line, palliative, systemic treatment with a combination of [id number] bevacizumab (the Avastin biosimilar Mvasi) and g6txubrz atezolizumab (Tecentriq). She received a total of eight (8), t3kujhun cycles through the [facility] between Fall  2022 and early April 2023 (the eighth and final cycle of both were given on 04/03/2023). With this therapy, MRIs of the abdomen (liver protocol) performed on 04/13/2023 and 11/03/2023 (both summarized above) showed no visible signs of a liver mass at all. Meanwhile, with the therapy she has received to date, her serum AFP level declined from >1000 ng/mL in late October 2023 to solidly WNL by January 2023), consistent with a complete response in her disease. I therefore had a long discussion with the patient and her sister in Spring 2023 regarding our treatment recommendations from that point. In short, the potential risks of any additional cycles of Avastin (particularly bleeding; she has an aortic aneurysm in addition to her baseline cirrhosis) likely outweighed, and still outweighs, its marginal, potential benefit (at least at that/this time); however, continuing indefinite, q5bhzddr atezolizumab continues to be recommended (as the current standard of care; it appears to have worked extremely well). She has now completed a total of eighteen (18) cycles (with the eighteenth and, to date, most recent one given in early November 2023). At that time, it was her preference that we place this therapy on indefinite hold, as she was, and remains, convinced the Lord has healed her again (this time of her HCC); and she believed that the indefinite atezolizumab was causing her chronic, quality-of-life limiting fatigue (and this symptom has been getting noticeably better since we started holding the atezolizumab). She remains fully aware of the potential risks of discontinuing this medication. The most recent repeat MRIs of the abdomen, performed on 05/14/2024 and, now, most recently, 08/05/2024 (both summarized above) have shown, and continue to show, some early signs of probable, relapsing disease, with two arterial phase enhancing lesions immediately adjacent to each other localizing to segment 7/8 now visible (and slowly  "enlarging) again. Despite this, she still prefers to continue with expectant monitoring only; however, she remains agreeable to continued, routine followup imaging). We will therefore see her back in clinic in another three months (~mid-November) with a CBC, CMP and repeat MRI of the abdomen with and without contrast.  Cirrhosis: Likely secondary to a longstanding history of both issue #3 (which was diagnosed and reportedly cured in ~2018, probably decades after she initially contracted it through her ) and alcohol abuse (she drank \"a lot\" of beer every day for ~twenty years but quit in the ). Currently still compensated. Ongoing management per gastroenterology/hepatology.  Hepatitis C: Reportedly contracted through her  (who ultimately  from it) without her knowledge, but also reportedly diagnosed and cured with a months-long course of antivirals in ~. Ongoing management per gastroenterology/hepatology.  Protein calorie malnutrition: Recently still much improved. Continue Marinol 2.5 mg PO BID. Continue to monitor.  Gallbladder issues: Per the patient, she will hopefully be cleared to undergo a cholecystectomy soon.  Aortic aneurysm: Ongoing monitoring per CT/vascular surgery.  The patient was in agreement with these plans.    It is a pleasure to participate in Ms. Brooks's care. Please do not hesitate to call with any questions or concerns that you may have.    A total of 30 minutes were spent coordinating this patient’s care in clinic today; more than 50% of this time was face-to-face with the patient, reviewing her interim medical history, discussing the results of this month's repeat MRI of the abdomen and counseling on the current followup plan. All questions were answered to her satisfaction.    FOLLOW UP  Continue to defer any additional cycles of [id number] atezolizumab (at least for now), per patient preference. Return to our clinic in 3 months (~mid-November) with a CBC, CMP, " TSH, AFP and repeat MRI of the abdomen with and without contrast.            This document was electronically signed by NADEGE Boss MD August 14, 2024 15:12 EDT      CC: DO Henri Davis MD Hao Zhonglin, MD Erika G. Almodovar, MD John H. Chaney, MD

## 2024-08-14 NOTE — PROGRESS NOTES
Venipuncture Blood Specimen Collection  Venipuncture performed in right arm by Ana Montoya MA with good hemostasis. Patient tolerated the procedure well without complications.   08/14/24   Ana Montoya MA

## 2024-08-14 NOTE — TELEPHONE ENCOUNTER
Caller: Noemy Brooks    Relationship: Self    Best call back number: [phone]    What is the best time to reach you: ANY    Who are you requesting to speak with (clinical staff, provider,  specific staff member): CLINICAL    Do you know the name of the person who called: NO    What was the call regarding: RESCHEDULING    Is it okay if the provider responds through MyChart: NO

## 2024-08-15 LAB — ALPHA-FETOPROTEIN: 15.1 NG/ML (ref 0–8.3)

## 2024-08-22 ENCOUNTER — TELEPHONE (OUTPATIENT)
Dept: ONCOLOGY | Facility: CLINIC | Age: 64
End: 2024-08-22
Payer: MEDICARE

## 2024-08-22 NOTE — TELEPHONE ENCOUNTER
Heart burn medication that her  doctor prescribe to her is Dexilant.  she was reading and  it has a lot of side effects and she said that the medication said if you have liver problems should as doctor before taking. She wanted to know if I Dr. Boss thought would be okay for her to take it

## 2024-08-22 NOTE — TELEPHONE ENCOUNTER
RN consulted with Dr. Boss about patient medication question. RN spoke with patient and let her know that this medication would be okay for her to take at this time. No other questions or concerns at this time.

## 2024-08-31 ENCOUNTER — HOSPITAL ENCOUNTER (EMERGENCY)
Facility: HOSPITAL | Age: 64
Discharge: HOME | End: 2024-08-31
Attending: STUDENT IN AN ORGANIZED HEALTH CARE EDUCATION/TRAINING PROGRAM
Payer: MEDICARE

## 2024-08-31 ENCOUNTER — APPOINTMENT (OUTPATIENT)
Dept: ULTRASOUND IMAGING | Facility: HOSPITAL | Age: 64
End: 2024-08-31
Payer: MEDICARE

## 2024-08-31 VITALS
TEMPERATURE: 98.9 F | SYSTOLIC BLOOD PRESSURE: 126 MMHG | OXYGEN SATURATION: 92 % | HEIGHT: 67 IN | RESPIRATION RATE: 20 BRPM | BODY MASS INDEX: 20.4 KG/M2 | DIASTOLIC BLOOD PRESSURE: 74 MMHG | HEART RATE: 86 BPM | WEIGHT: 130 LBS

## 2024-08-31 DIAGNOSIS — K80.20 CALCULUS OF GALLBLADDER WITHOUT CHOLECYSTITIS WITHOUT OBSTRUCTION: Primary | ICD-10-CM

## 2024-08-31 LAB
ALBUMIN SERPL-MCNC: 3.6 G/DL (ref 3.5–5.2)
ALBUMIN/GLOB SERPL: 0.9 G/DL
ALP SERPL-CCNC: 109 U/L (ref 39–117)
ALT SERPL W P-5'-P-CCNC: 32 U/L (ref 1–33)
ANION GAP SERPL CALCULATED.3IONS-SCNC: 11.8 MMOL/L (ref 5–15)
AST SERPL-CCNC: 68 U/L (ref 1–32)
BACTERIA UR QL AUTO: ABNORMAL /HPF
BASOPHILS # BLD AUTO: 0.07 10*3/MM3 (ref 0–0.2)
BASOPHILS NFR BLD AUTO: 0.8 % (ref 0–1.5)
BILIRUB SERPL-MCNC: 1.4 MG/DL (ref 0–1.2)
BILIRUB UR QL STRIP: NEGATIVE
BUN SERPL-MCNC: 7 MG/DL (ref 8–23)
BUN/CREAT SERPL: 11.5 (ref 7–25)
CALCIUM SPEC-SCNC: 9.2 MG/DL (ref 8.6–10.5)
CHLORIDE SERPL-SCNC: 106 MMOL/L (ref 98–107)
CLARITY UR: CLEAR
CO2 SERPL-SCNC: 23.2 MMOL/L (ref 22–29)
COLOR UR: YELLOW
CREAT SERPL-MCNC: 0.61 MG/DL (ref 0.57–1)
CRP SERPL-MCNC: 0.39 MG/DL (ref 0–0.5)
DEPRECATED RDW RBC AUTO: 51.8 FL (ref 37–54)
EGFRCR SERPLBLD CKD-EPI 2021: 98.7 ML/MIN/1.73
EOSINOPHIL # BLD AUTO: 0.25 10*3/MM3 (ref 0–0.4)
EOSINOPHIL NFR BLD AUTO: 3 % (ref 0.3–6.2)
ERYTHROCYTE [DISTWIDTH] IN BLOOD BY AUTOMATED COUNT: 13.8 % (ref 12.3–15.4)
GLOBULIN UR ELPH-MCNC: 3.9 GM/DL
GLUCOSE SERPL-MCNC: 91 MG/DL (ref 65–99)
GLUCOSE UR STRIP-MCNC: NEGATIVE MG/DL
HCT VFR BLD AUTO: 41.5 % (ref 34–46.6)
HGB BLD-MCNC: 13.8 G/DL (ref 12–15.9)
HGB UR QL STRIP.AUTO: NEGATIVE
HOLD SPECIMEN: NORMAL
HOLD SPECIMEN: NORMAL
HYALINE CASTS UR QL AUTO: ABNORMAL /LPF
IMM GRANULOCYTES # BLD AUTO: 0.03 10*3/MM3 (ref 0–0.05)
IMM GRANULOCYTES NFR BLD AUTO: 0.4 % (ref 0–0.5)
KETONES UR QL STRIP: NEGATIVE
LEUKOCYTE ESTERASE UR QL STRIP.AUTO: ABNORMAL
LIPASE SERPL-CCNC: 23 U/L (ref 13–60)
LYMPHOCYTES # BLD AUTO: 1.38 10*3/MM3 (ref 0.7–3.1)
LYMPHOCYTES NFR BLD AUTO: 16.3 % (ref 19.6–45.3)
MCH RBC QN AUTO: 33.7 PG (ref 26.6–33)
MCHC RBC AUTO-ENTMCNC: 33.3 G/DL (ref 31.5–35.7)
MCV RBC AUTO: 101.2 FL (ref 79–97)
MONOCYTES # BLD AUTO: 1.12 10*3/MM3 (ref 0.1–0.9)
MONOCYTES NFR BLD AUTO: 13.2 % (ref 5–12)
NEUTROPHILS NFR BLD AUTO: 5.62 10*3/MM3 (ref 1.7–7)
NEUTROPHILS NFR BLD AUTO: 66.3 % (ref 42.7–76)
NITRITE UR QL STRIP: NEGATIVE
NRBC BLD AUTO-RTO: 0 /100 WBC (ref 0–0.2)
PH UR STRIP.AUTO: 7.5 [PH] (ref 5–8)
PLATELET # BLD AUTO: 123 10*3/MM3 (ref 140–450)
PMV BLD AUTO: 9.6 FL (ref 6–12)
POTASSIUM SERPL-SCNC: 3.2 MMOL/L (ref 3.5–5.2)
PROT SERPL-MCNC: 7.5 G/DL (ref 6–8.5)
PROT UR QL STRIP: NEGATIVE
RBC # BLD AUTO: 4.1 10*6/MM3 (ref 3.77–5.28)
RBC # UR STRIP: ABNORMAL /HPF
REF LAB TEST METHOD: ABNORMAL
SODIUM SERPL-SCNC: 141 MMOL/L (ref 136–145)
SP GR UR STRIP: 1.01 (ref 1–1.03)
SQUAMOUS #/AREA URNS HPF: ABNORMAL /HPF
UROBILINOGEN UR QL STRIP: ABNORMAL
WBC # UR STRIP: ABNORMAL /HPF
WBC NRBC COR # BLD AUTO: 8.47 10*3/MM3 (ref 3.4–10.8)
WHOLE BLOOD HOLD COAG: NORMAL
WHOLE BLOOD HOLD SPECIMEN: NORMAL

## 2024-08-31 PROCEDURE — 83690 ASSAY OF LIPASE: CPT | Performed by: PHYSICIAN ASSISTANT

## 2024-08-31 PROCEDURE — 36415 COLL VENOUS BLD VENIPUNCTURE: CPT

## 2024-08-31 PROCEDURE — 86140 C-REACTIVE PROTEIN: CPT | Performed by: PHYSICIAN ASSISTANT

## 2024-08-31 PROCEDURE — 25010000002 MORPHINE PER 10 MG: Performed by: STUDENT IN AN ORGANIZED HEALTH CARE EDUCATION/TRAINING PROGRAM

## 2024-08-31 PROCEDURE — 96375 TX/PRO/DX INJ NEW DRUG ADDON: CPT

## 2024-08-31 PROCEDURE — 96374 THER/PROPH/DIAG INJ IV PUSH: CPT

## 2024-08-31 PROCEDURE — 76705 ECHO EXAM OF ABDOMEN: CPT

## 2024-08-31 PROCEDURE — 81001 URINALYSIS AUTO W/SCOPE: CPT | Performed by: PHYSICIAN ASSISTANT

## 2024-08-31 PROCEDURE — 85025 COMPLETE CBC W/AUTO DIFF WBC: CPT | Performed by: PHYSICIAN ASSISTANT

## 2024-08-31 PROCEDURE — 25010000002 ONDANSETRON PER 1 MG: Performed by: STUDENT IN AN ORGANIZED HEALTH CARE EDUCATION/TRAINING PROGRAM

## 2024-08-31 PROCEDURE — 80053 COMPREHEN METABOLIC PANEL: CPT | Performed by: PHYSICIAN ASSISTANT

## 2024-08-31 PROCEDURE — 99284 EMERGENCY DEPT VISIT MOD MDM: CPT

## 2024-08-31 PROCEDURE — 76705 ECHO EXAM OF ABDOMEN: CPT | Performed by: RADIOLOGY

## 2024-08-31 RX ORDER — OXYCODONE HYDROCHLORIDE 5 MG/1
5 TABLET ORAL ONCE
Status: COMPLETED | OUTPATIENT
Start: 2024-08-31 | End: 2024-08-31

## 2024-08-31 RX ORDER — SODIUM CHLORIDE 0.9 % (FLUSH) 0.9 %
10 SYRINGE (ML) INJECTION AS NEEDED
Status: DISCONTINUED | OUTPATIENT
Start: 2024-08-31 | End: 2024-08-31 | Stop reason: HOSPADM

## 2024-08-31 RX ORDER — ONDANSETRON 2 MG/ML
4 INJECTION INTRAMUSCULAR; INTRAVENOUS ONCE
Status: COMPLETED | OUTPATIENT
Start: 2024-08-31 | End: 2024-08-31

## 2024-08-31 RX ADMIN — OXYCODONE 5 MG: 5 TABLET ORAL at 18:53

## 2024-08-31 RX ADMIN — ONDANSETRON 4 MG: 2 INJECTION INTRAMUSCULAR; INTRAVENOUS at 16:37

## 2024-08-31 RX ADMIN — MORPHINE SULFATE 4 MG: 4 INJECTION, SOLUTION INTRAMUSCULAR; INTRAVENOUS at 16:38

## 2024-08-31 NOTE — ED PROVIDER NOTES
Subjective   History of Present Illness  64-year-old female with past medical history of ovarian cancer, osteoporosis, viral hepatitis, hypertension, anemia, hypercholesterolemia, thyroid disease, depression, COPD, cirrhosis, uterine and liver cancer, GERD, and aortic aneurysm presents to the emergency room with right upper quadrant abdominal pain.  Patient states she has had gallbladder issues for the past 7 years and states that she has relapsing and remitting gallbladder attacks.  She states that she has been seen by multiple surgeons none of which will perform a cholecystectomy secondary to her degree of cirrhosis and states she is unsure of what she is going to do with these intermittent attacks.  She states that she generally eats a bland diet, however states sometimes it is very hard to adhere to this at all times.  She states that she is scheduled for some sort of abdominal scan next Friday ordered by her general surgeon at the [facility] but was not sure if anything could be done sooner.  She denies any vomiting, fevers, or chills.  Denies any other complaints or concerns at this time.    History provided by:  Patient   used: No        Review of Systems   Constitutional: Negative.  Negative for fever.   HENT: Negative.     Respiratory: Negative.     Cardiovascular: Negative.  Negative for chest pain.   Gastrointestinal:  Positive for abdominal pain.   Endocrine: Negative.    Genitourinary: Negative.  Negative for dysuria.   Skin: Negative.    Neurological: Negative.    Psychiatric/Behavioral: Negative.     All other systems reviewed and are negative.      Past Medical History:   Diagnosis Date    Acid reflux     Aortic aneurysm     Cancer     LIVER, UTERINE    Cirrhosis     COPD (chronic obstructive pulmonary disease)     Depression     Disease of thyroid gland     Elevated cholesterol     High cholesterol     History of transfusion     Hypertension     Infectious viral  hepatitis     2019    Osteoporosis     Ovarian cancer        Allergies   Allergen Reactions    Codeine GI Intolerance       Past Surgical History:   Procedure Laterality Date    APPENDECTOMY N/A     COLONOSCOPY      ENDOSCOPY N/A 2023    Procedure: ESOPHAGOGASTRODUODENOSCOPY WITH BIOPSY;  Surgeon: Rahel Sesay MD;  Location: [facility];  Service: Gastroenterology;  Laterality: N/A;    FRACTURE SURGERY Left     ARM, HAS 2 SCREWS    HYSTERECTOMY      UPPER GASTROINTESTINAL ENDOSCOPY         Family History   Problem Relation Age of Onset    Stroke Mother     Hypertension Mother     COPD Mother     Throat cancer Mother     Arthritis Mother     Asthma Mother     Heart attack Mother     Stroke Father     Cancer Father     Liver cancer Father        Social History     Socioeconomic History    Marital status:     Number of children: 3   Tobacco Use    Smoking status: Former     Current packs/day: 0.00     Average packs/day: 1.5 packs/day for 25.0 years (37.5 ttl pk-yrs)     Types: Cigarettes     Start date: 1990     Quit date: 2015     Years since quittin.3    Smokeless tobacco: Never    Tobacco comments:     smoked for approx 20 years, 1.5 to 2 ppd   Vaping Use    Vaping status: Never Used   Substance and Sexual Activity    Alcohol use: No     Comment: she previously drank daily for 16 years, beer    Drug use: No    Sexual activity: Defer           Objective   Physical Exam  Vitals and nursing note reviewed.   Constitutional:       General: She is not in acute distress.     Appearance: She is well-developed. She is not diaphoretic.   HENT:      Head: Normocephalic and atraumatic.      Right Ear: External ear normal.      Left Ear: External ear normal.      Nose: Nose normal.   Eyes:      Conjunctiva/sclera: Conjunctivae normal.      Pupils: Pupils are equal, round, and reactive to light.   Neck:      Vascular: No JVD.      Trachea: No tracheal deviation.   Cardiovascular:      Rate and  Rhythm: Normal rate and regular rhythm.      Heart sounds: Normal heart sounds. No murmur heard.  Pulmonary:      Effort: Pulmonary effort is normal. No respiratory distress.      Breath sounds: Normal breath sounds. No wheezing.   Abdominal:      General: Bowel sounds are normal.      Palpations: Abdomen is soft.      Tenderness: There is no abdominal tenderness in the right upper quadrant. Positive signs include Zamora's sign.   Musculoskeletal:         General: No deformity. Normal range of motion.      Cervical back: Normal range of motion and neck supple.   Skin:     General: Skin is warm and dry.      Coloration: Skin is not pale.      Findings: No erythema or rash.   Neurological:      Mental Status: She is alert and oriented to person, place, and time.      Cranial Nerves: No cranial nerve deficit.   Psychiatric:         Behavior: Behavior normal.         Thought Content: Thought content normal.         Procedures           ED Course  ED Course as of 08/31/24 2047   Sat Aug 31, 2024   1652 US Gallbladder [TK]      ED Course User Index  [TK] Lorne Escamilla PA-C                                   US Gallbladder   Final Result       1. Few small gallstones. Despite a reportedly positive sonographic   Zamora's sign, no other sonographic features to suggest cholecystitis.            To TALK to On Call [phone]       This report was finalized on 8/31/2024 4:44 PM by Fam Benítez MD.                        Medical Decision Making  64-year-old female with past medical history of ovarian cancer, osteoporosis, viral hepatitis, hypertension, anemia, hypercholesterolemia, thyroid disease, depression, COPD, cirrhosis, uterine and liver cancer, GERD, and aortic aneurysm presents to the emergency room with right upper quadrant abdominal pain.  Patient states she has had gallbladder issues for the past 7 years and states that she has relapsing and remitting gallbladder attacks.  She states that she  has been seen by multiple surgeons none of which will perform a cholecystectomy secondary to her degree of cirrhosis and states she is unsure of what she is going to do with these intermittent attacks.  She states that she generally eats a bland diet, however states sometimes it is very hard to adhere to this at all times.  She states that she is scheduled for some sort of abdominal scan next Friday ordered by her general surgeon at the [facility] but was not sure if anything could be done sooner.  She denies any vomiting, fevers, or chills.  Denies any other complaints or concerns at this time.    Uncomplicated ED course.  Patient's labs are unremarkable.  Ultrasound of the gallbladder did show a few small cholelithiasis in the gallbladder itself.  There were no signs of cholecystitis.  Patient has remained afebrile and pain has been controlled with IV morphine x 1.  Patient states that she normally takes oxycodone 5 mg at home, however is out of this time.  She states that these attacks will usually hit and go away for another 2 months.  She is going to continue following with her general surgeon and gastroenterologist at the [facility] and has been instructed to return to the emergency room for new or worsening symptomatology.      Problems Addressed:  Calculus of gallbladder without cholecystitis without obstruction: complicated acute illness or injury    Amount and/or Complexity of Data Reviewed  Labs: ordered. Decision-making details documented in ED Course.  Radiology: ordered. Decision-making details documented in ED Course.    Risk  Prescription drug management.        Final diagnoses:   Calculus of gallbladder without cholecystitis without obstruction       ED Disposition  ED Disposition       ED Disposition   Discharge    Condition   Stable    Comment   --               Thai Avalos MD  [address] [facility] [id number] [phone]    In 2 days           Medication List       No changes were made to your prescriptions during this visit.            Lorne Escamilla PA-C  08/31/24 2045

## 2024-10-02 ENCOUNTER — TELEPHONE (OUTPATIENT)
Dept: CARDIAC SURGERY | Facility: CLINIC | Age: 64
End: 2024-10-02
Payer: MEDICARE

## 2024-10-02 NOTE — TELEPHONE ENCOUNTER
I spoke with the patient at 10:00am. I informed her if she was not on time her appointment will be rescheduled.

## 2024-10-02 NOTE — TELEPHONE ENCOUNTER
SPOKE WITH JOSEPH, SHE INFORMED ME THAT IF THE PATIENT DOESN'T ARRIVE TO HER APPOINTMENT ON TIME THEN SHE'LL HAVE TO RESCHEDULE. HUB HUNG UP BEFORE I GOT BACK SO I TRIED CALLING PATIENT, SOMEONE ANSWERED BUT DIDN'T TALK AT ALL & THEN HUNG UP

## 2024-10-02 NOTE — TELEPHONE ENCOUNTER
Hub staff attempted to follow warm transfer process and was unsuccessful     Caller: Noemy Brooks    Relationship to patient: Self    Best call back number: [phone]      Patient is needing: PT IS ON HER WAY TO HER APPT ON 10/2/24.   [facility] WAS RUNNING BEHIND AND PICKED PT UP AT HER HOME ADDRESS AT APPROX 9:45AM.   I ATTEMPTED TO WT TO OFFICE, FLOR WAS CHECKING TO SEE IF PT COULD STILL BE SEEN ON 10/2/24 BUT I HELD FOR 2 MIN. WORKFLOW DICTATES I MUST DISCONNECT AFTER HOLDING FOR 1 MINUTE. I NOTIFIED PT THAT SOMEONE WOULD CALL HER IF APPT NEEDED TO BE R/S.

## 2024-10-17 ENCOUNTER — TELEPHONE (OUTPATIENT)
Dept: CARDIAC SURGERY | Facility: CLINIC | Age: 64
End: 2024-10-17
Payer: MEDICARE

## 2024-10-17 NOTE — TELEPHONE ENCOUNTER
Pt refuses to reschedule appt. She stated she went to doctor and they couldn't find it so she don't want to go through the testing and every thing again.

## 2024-10-25 ENCOUNTER — TELEPHONE (OUTPATIENT)
Dept: ONCOLOGY | Facility: CLINIC | Age: 64
End: 2024-10-25

## 2024-10-25 NOTE — TELEPHONE ENCOUNTER
Caller: Noemy Brooks    Relationship: Self    Best call back number: [phone]    Who are you requesting to speak with (clinical staff, provider,  specific staff member):   CLINICAL    What was the call regarding: PT ADVISES THERE WILL BE A REFERRAL FAX COMING THROUGH FOR HER TRANSPORTATION TO HER 11/8 AND 11/14 APPTS.      PT REQUESTS A CALL ONCE THE FORMS HAVE BEEN FILLED OUT AND FAXED BACK.

## 2024-10-28 ENCOUNTER — DOCUMENTATION (OUTPATIENT)
Dept: ONCOLOGY | Facility: CLINIC | Age: 64
End: 2024-10-28
Payer: MEDICARE

## 2024-10-31 ENCOUNTER — DOCUMENTATION (OUTPATIENT)
Dept: ONCOLOGY | Facility: CLINIC | Age: 64
End: 2024-10-31
Payer: MEDICARE

## 2024-10-31 NOTE — PROGRESS NOTES
SS received call from patient requesting information on housing. SS to follow up with patient regarding issues.    SS will follow.

## 2024-11-06 ENCOUNTER — DOCUMENTATION (OUTPATIENT)
Dept: ONCOLOGY | Facility: CLINIC | Age: 64
End: 2024-11-06
Payer: MEDICARE

## 2024-11-07 ENCOUNTER — DOCUMENTATION (OUTPATIENT)
Dept: ONCOLOGY | Facility: CLINIC | Age: 64
End: 2024-11-07
Payer: MEDICARE

## 2024-11-08 ENCOUNTER — HOSPITAL ENCOUNTER (OUTPATIENT)
Dept: MRI IMAGING | Facility: HOSPITAL | Age: 64
Discharge: HOME | End: 2024-11-08
Payer: MEDICARE

## 2024-11-08 DIAGNOSIS — C22.0 HEPATOCELLULAR CARCINOMA: ICD-10-CM

## 2024-11-08 DIAGNOSIS — Z79.899 LONG-TERM USE OF HIGH-RISK MEDICATION: ICD-10-CM

## 2024-11-08 PROCEDURE — 74183 MRI ABD W/O CNTR FLWD CNTR: CPT

## 2024-11-08 PROCEDURE — 0 GADOBENATE DIMEGLUMINE 529 MG/ML SOLUTION: Performed by: INTERNAL MEDICINE

## 2024-11-08 PROCEDURE — A9577 INJ MULTIHANCE: HCPCS | Performed by: INTERNAL MEDICINE

## 2024-11-08 RX ADMIN — GADOBENATE DIMEGLUMINE 11 ML: 529 INJECTION, SOLUTION INTRAVENOUS at 14:39

## 2024-11-12 DIAGNOSIS — C22.0 HEPATOCELLULAR CARCINOMA: Primary | ICD-10-CM

## 2024-11-12 DIAGNOSIS — R53.82 CHRONIC FATIGUE: ICD-10-CM

## 2024-11-14 ENCOUNTER — LAB (OUTPATIENT)
Dept: ONCOLOGY | Facility: CLINIC | Age: 64
End: 2024-11-14
Payer: MEDICARE

## 2024-11-14 ENCOUNTER — OFFICE VISIT (OUTPATIENT)
Dept: ONCOLOGY | Facility: CLINIC | Age: 64
End: 2024-11-14
Payer: MEDICARE

## 2024-11-14 VITALS
HEART RATE: 91 BPM | HEIGHT: 67 IN | OXYGEN SATURATION: 92 % | BODY MASS INDEX: 20.75 KG/M2 | RESPIRATION RATE: 18 BRPM | DIASTOLIC BLOOD PRESSURE: 72 MMHG | SYSTOLIC BLOOD PRESSURE: 125 MMHG | TEMPERATURE: 97.7 F | WEIGHT: 132.2 LBS

## 2024-11-14 DIAGNOSIS — C22.0 HEPATOCELLULAR CARCINOMA: ICD-10-CM

## 2024-11-14 DIAGNOSIS — R53.82 CHRONIC FATIGUE: ICD-10-CM

## 2024-11-14 DIAGNOSIS — C22.0 HEPATOCELLULAR CARCINOMA: Primary | ICD-10-CM

## 2024-11-14 DIAGNOSIS — Z79.899 LONG-TERM USE OF HIGH-RISK MEDICATION: ICD-10-CM

## 2024-11-14 LAB
ALBUMIN SERPL-MCNC: 3.5 G/DL (ref 3.5–5.2)
ALBUMIN/GLOB SERPL: 1 G/DL
ALP SERPL-CCNC: 110 U/L (ref 39–117)
ALT SERPL W P-5'-P-CCNC: 32 U/L (ref 1–33)
ANION GAP SERPL CALCULATED.3IONS-SCNC: 9.7 MMOL/L (ref 5–15)
AST SERPL-CCNC: 43 U/L (ref 1–32)
BASOPHILS # BLD AUTO: 0.07 10*3/MM3 (ref 0–0.2)
BASOPHILS NFR BLD AUTO: 1.2 % (ref 0–1.5)
BILIRUB SERPL-MCNC: 0.6 MG/DL (ref 0–1.2)
BUN SERPL-MCNC: 10 MG/DL (ref 8–23)
BUN/CREAT SERPL: 15.9 (ref 7–25)
CALCIUM SPEC-SCNC: 9.3 MG/DL (ref 8.6–10.5)
CHLORIDE SERPL-SCNC: 111 MMOL/L (ref 98–107)
CO2 SERPL-SCNC: 22.3 MMOL/L (ref 22–29)
CREAT SERPL-MCNC: 0.63 MG/DL (ref 0.57–1)
DEPRECATED RDW RBC AUTO: 51.5 FL (ref 37–54)
EGFRCR SERPLBLD CKD-EPI 2021: 98 ML/MIN/1.73
EOSINOPHIL # BLD AUTO: 0.31 10*3/MM3 (ref 0–0.4)
EOSINOPHIL NFR BLD AUTO: 5.2 % (ref 0.3–6.2)
ERYTHROCYTE [DISTWIDTH] IN BLOOD BY AUTOMATED COUNT: 13.6 % (ref 12.3–15.4)
GLOBULIN UR ELPH-MCNC: 3.4 GM/DL
GLUCOSE SERPL-MCNC: 119 MG/DL (ref 65–99)
HCT VFR BLD AUTO: 39.8 % (ref 34–46.6)
HGB BLD-MCNC: 13.1 G/DL (ref 12–15.9)
IMM GRANULOCYTES # BLD AUTO: 0.01 10*3/MM3 (ref 0–0.05)
IMM GRANULOCYTES NFR BLD AUTO: 0.2 % (ref 0–0.5)
LYMPHOCYTES # BLD AUTO: 1.4 10*3/MM3 (ref 0.7–3.1)
LYMPHOCYTES NFR BLD AUTO: 23.5 % (ref 19.6–45.3)
MCH RBC QN AUTO: 33.3 PG (ref 26.6–33)
MCHC RBC AUTO-ENTMCNC: 32.9 G/DL (ref 31.5–35.7)
MCV RBC AUTO: 101.3 FL (ref 79–97)
MONOCYTES # BLD AUTO: 0.67 10*3/MM3 (ref 0.1–0.9)
MONOCYTES NFR BLD AUTO: 11.2 % (ref 5–12)
NEUTROPHILS NFR BLD AUTO: 3.51 10*3/MM3 (ref 1.7–7)
NEUTROPHILS NFR BLD AUTO: 58.7 % (ref 42.7–76)
NRBC BLD AUTO-RTO: 0 /100 WBC (ref 0–0.2)
PLATELET # BLD AUTO: 124 10*3/MM3 (ref 140–450)
PMV BLD AUTO: 9.6 FL (ref 6–12)
POTASSIUM SERPL-SCNC: 3.9 MMOL/L (ref 3.5–5.2)
PROT SERPL-MCNC: 6.9 G/DL (ref 6–8.5)
RBC # BLD AUTO: 3.93 10*6/MM3 (ref 3.77–5.28)
SODIUM SERPL-SCNC: 143 MMOL/L (ref 136–145)
TSH SERPL DL<=0.05 MIU/L-ACNC: 3.46 UIU/ML (ref 0.27–4.2)
WBC NRBC COR # BLD AUTO: 5.97 10*3/MM3 (ref 3.4–10.8)

## 2024-11-14 PROCEDURE — 84443 ASSAY THYROID STIM HORMONE: CPT | Performed by: INTERNAL MEDICINE

## 2024-11-14 PROCEDURE — 85025 COMPLETE CBC W/AUTO DIFF WBC: CPT | Performed by: INTERNAL MEDICINE

## 2024-11-14 PROCEDURE — 80053 COMPREHEN METABOLIC PANEL: CPT | Performed by: INTERNAL MEDICINE

## 2024-11-14 PROCEDURE — 82105 ALPHA-FETOPROTEIN SERUM: CPT | Performed by: INTERNAL MEDICINE

## 2024-11-14 RX ORDER — SODIUM CHLORIDE 9 MG/ML
20 INJECTION, SOLUTION INTRAVENOUS ONCE
OUTPATIENT
Start: 2024-12-05

## 2024-11-14 RX ORDER — SODIUM CHLORIDE 9 MG/ML
20 INJECTION, SOLUTION INTRAVENOUS ONCE
OUTPATIENT
Start: 2025-02-06

## 2024-11-14 RX ORDER — SODIUM CHLORIDE 9 MG/ML
20 INJECTION, SOLUTION INTRAVENOUS ONCE
OUTPATIENT
Start: 2025-02-27

## 2024-11-14 RX ORDER — SODIUM CHLORIDE 9 MG/ML
20 INJECTION, SOLUTION INTRAVENOUS ONCE
OUTPATIENT
Start: 2024-12-26

## 2024-11-14 RX ORDER — SODIUM CHLORIDE 9 MG/ML
20 INJECTION, SOLUTION INTRAVENOUS ONCE
OUTPATIENT
Start: 2025-01-16

## 2024-11-14 NOTE — PROGRESS NOTES
Venipuncture Blood Specimen Collection  Venipuncture performed in rIGHT ARM by Kemi Landry MA with good hemostasis. Patient tolerated the procedure well without complications.   11/14/24   Kemi Landry MA

## 2024-11-14 NOTE — PROGRESS NOTES
Name:  Noemy Brooks  :  1960  Date:  2024     REFERRING PHYSICIAN  Henri Melvin MD    PRIMARY CARE PHYSICIAN  Lynn, Haley Roca DO    REASON FOR FOLLOWUP  1. Hepatocellular carcinoma      CHIEF COMPLAINT  None.    Dear Dr. Bailey,    HISTORY OF PRESENT ILLNESS:   I saw Ms. Brooks in followup today in our medical oncology clinic. As you are aware, she is a pleasant, 64 y.o., white female with a history of hypertension, hepatitis C (treated with antivirals and reportedly cured in ~), cirrhosis and hepatocellular carcinoma who was initially diagnosed with the latter in ~2022. [place], [place]. Her [facility] gastroenterologist referred her to  at that time once she was found to have a couple of liver masses and a serum AFP > 1,000 ng/mL. She was evaluated by the liver transplant service; however, due to the HCC's involvement of the hepatic vessels, she was felt to not be a candidate (for transplant). She was subsequently evaluated by  medical oncology, who recommended starting first-line, palliative treatment with a combination of f7dchmvo bevacizumab (Mvasi) and atezolizumab (Tecentriq), per the current standard of care. She received a total of eight (8) cycles between 2022 and early 2023 through the [facility]. Due to transportation issues (her sister has to be the one to drive her, and the trips to [place] have been getting increasingly difficult for her), she presented to our clinic in 2023 in order to transfer her ongoing, oncologic care to us, closer to her home in [place], [place].    INTERIM HISTORY:  Ms. Brooks returns to clinic today for follow up by herself. She tolerated [id number] atezolizumab overall well, and she has now received a total of eighteen (18) cycles to date (with the eighteenth and, to date, most recent one given on 2023). Per her preference, we have been holding this therapy ever since then. She previously stated that the   cured her once before (of Hepatitis C), and she became convinced that he has/will do it again (this time for her HCC). Since holding the immunotherapy, her chronic fatigue has remained improved. Due to her history of cirrhosis, she has been deemed to be too a high of a risk to undergo the cholecystectomy she had recently been pursuing. She has no new complaints in clinic today.    Past Medical History:   Diagnosis Date    Acid reflux     Aortic aneurysm     Cancer     LIVER, UTERINE    Cirrhosis     COPD (chronic obstructive pulmonary disease)     Depression     Disease of thyroid gland     Elevated cholesterol     High cholesterol     History of transfusion     Hypertension     Infectious viral hepatitis         Osteoporosis     Ovarian cancer        Past Surgical History:   Procedure Laterality Date    APPENDECTOMY N/A     COLONOSCOPY      ENDOSCOPY N/A 2023    Procedure: ESOPHAGOGASTRODUODENOSCOPY WITH BIOPSY;  Surgeon: Rahel Sesay MD;  Location: [facility];  Service: Gastroenterology;  Laterality: N/A;    FRACTURE SURGERY Left     ARM, HAS 2 SCREWS    HYSTERECTOMY      UPPER GASTROINTESTINAL ENDOSCOPY         Social History     Socioeconomic History    Marital status:     Number of children: 3   Tobacco Use    Smoking status: Former     Current packs/day: 0.00     Average packs/day: 1.5 packs/day for 25.0 years (37.5 ttl pk-yrs)     Types: Cigarettes     Start date: 1990     Quit date: 2015     Years since quittin.5    Smokeless tobacco: Never    Tobacco comments:     smoked for approx 20 years, 1.5 to 2 ppd   Vaping Use    Vaping status: Never Used   Substance and Sexual Activity    Alcohol use: No     Comment: she previously drank daily for 16 years, beer    Drug use: No    Sexual activity: Defer       Family History   Problem Relation Age of Onset    Stroke Mother     Hypertension Mother     COPD Mother     Throat cancer Mother     Arthritis Mother     Asthma Mother      Heart attack Mother     Stroke Father     Cancer Father     Liver cancer Father        Allergies   Allergen Reactions    Codeine GI Intolerance       Current Outpatient Medications   Medication Sig Dispense Refill    albuterol sulfate  (90 Base) MCG/ACT inhaler Inhale 2 puffs Every 4 (Four) Hours As Needed for Wheezing.      B Complex Vitamins (VITAMIN B COMPLEX PO) Take  by mouth.      budesonide-formoterol (SYMBICORT) 160-4.5 MCG/ACT inhaler Inhale 2 puffs 2 (Two) Times a Day.      cholecalciferol (VITAMIN D3) 25 MCG (1000 UT) tablet Take 1 tablet by mouth Daily.      Magnesium Hydroxide (MILK OF MAGNESIA PO) Take  by mouth.      multivitamin (MULTI VITAMIN DAILY PO) Take 1 tablet by mouth Daily.      omeprazole (priLOSEC) 20 MG capsule       oxyCODONE (Roxicodone) 5 MG immediate release tablet Take 1 tablet by mouth Every 6 (Six) Hours As Needed for Severe Pain. 10 tablet 0    pantoprazole (PROTONIX) 40 MG EC tablet Take 1 tablet by mouth Daily. 30 tablet 0    traZODone (DESYREL) 100 MG tablet Take 1 tablet by mouth Every Night.      Trelegy Ellipta 200-62.5-25 MCG/ACT inhaler       Atezolizumab (TECENTRIQ IV) Infuse  into a venous catheter Every 21 (Twenty-One) Days. (Patient not taking: Reported on 8/14/2024)      cyclobenzaprine (FLEXERIL) 5 MG tablet Take 1 tablet by mouth 3 (Three) Times a Day As Needed for Muscle Spasms. (Patient not taking: Reported on 8/14/2024)      diphenhydrAMINE 12.5 MG/5ML elixir 20 mL, aluminum-magnesium hydroxide-simethicone 400-400-40 MG/5ML suspension 20 mL, Lidocaine Viscous HCl 2 % solution 20 mL Swish and spit 15 mL Every 4 (Four) Hours As Needed. (Patient not taking: Reported on 11/14/2024)      dronabinol (Marinol) 2.5 MG capsule Take 1 capsule by mouth 2 (Two) Times a Day Before Meals. (Patient not taking: Reported on 11/14/2024) 60 capsule 2    levothyroxine (SYNTHROID, LEVOTHROID) 25 MCG tablet TAKE ONE (1) TABLET BY MOUTH EVERY MORNING. (Patient not taking:  "Reported on 2024) 30 tablet 5    lisinopril (PRINIVIL,ZESTRIL) 30 MG tablet Take 1 tablet by mouth Daily. (Patient not taking: Reported on 2024)      lisinopril-hydrochlorothiazide (PRINZIDE,ZESTORETIC) 20-12.5 MG per tablet Take 1 tablet by mouth Daily. (Patient not taking: Reported on 2024)      ondansetron ODT (ZOFRAN-ODT) 4 MG disintegrating tablet Place 1 tablet on the tongue Every 6 (Six) Hours As Needed for Nausea or Vomiting. (Patient not taking: Reported on 2024) 12 tablet 0     No current facility-administered medications for this visit.     REVIEW OF SYSTEMS  CONSTITUTIONAL:  No fever, chills or night sweats. Chronic fatigue, though improved since stopping palliative immunotherapy ~one year ago.  EYES:  No blurry vision, diplopia or other vision changes.  ENT:  No hearing loss, nosebleeds or sore throat.  CARDIOVASCULAR:  No palpitations, arrhythmia, syncopal episodes or edema.  PULMONARY:  No hemoptysis, wheezing, chronic cough or shortness of breath.  GASTROINTESTINAL:  As per the HPI above.  GENITOURINARY:  No hematuria, kidney stones or frequent urination.  MUSCULOSKELETAL:  No joint or back pains.  INTEGUMENTARY: No rashes or pruritus.  ENDOCRINE:  No excessive thirst or hot flashes.  HEMATOLOGIC:  No history of free bleeding, spontaneous bleeding or clotting.  IMMUNOLOGIC:  No allergies or frequent infections.  NEUROLOGIC: No numbness, tingling, seizures or weakness.  PSYCHIATRIC:  No anxiety or depression.    PHYSICAL EXAMINATION  /72   Pulse 91   Temp 97.7 °F (36.5 °C) (Temporal)   Resp 18   Ht 170.2 cm (67.01\")   Wt 60 kg (132 lb 3.2 oz)   SpO2 92%   BMI 20.70 kg/m²     Pain Score:  Pain Score    24 1539   PainSc: 0-No pain     PHQ-Score Total:  PHQ-9 Total Score:      ECO  GENERAL:  A well-developed, well-nourished, thin, white female in no acute distress.  HEENT:  Pupils equally round and reactive to light. Extraocular muscles " intact.  CARDIOVASCULAR:  Regular rate and rhythm. No murmurs, gallops or rubs.  LUNGS:  Clear to auscultation bilaterally.  ABDOMEN:  Soft, nontender, nondistended with positive bowel sounds.  EXTREMITIES:  No clubbing, cyanosis or edema bilaterally.  SKIN:  No rashes or petechiae.  NEURO:  Cranial nerves grossly intact. No focal deficits.  PSYCH:  Alert and oriented x3.    The physical exam is again unchanged from recent priors.    LABORATORY  Lab Results   Component Value Date    WBC 5.97 11/14/2024    HGB 13.1 11/14/2024    HCT 39.8 11/14/2024    .3 (H) 11/14/2024     (L) 11/14/2024    NEUTROABS 3.51 11/14/2024       Lab Results   Component Value Date     11/14/2024    K 3.9 11/14/2024     (H) 11/14/2024    CO2 22.3 11/14/2024    BUN 10 11/14/2024    CREATININE 0.63 11/14/2024    GLUCOSE 119 (H) 11/14/2024    CALCIUM 9.3 11/14/2024    AST 43 (H) 11/14/2024    ALT 32 11/14/2024    ALKPHOS 110 11/14/2024    BILITOT 0.6 11/14/2024    PROTEINTOT 6.9 11/14/2024    ALBUMIN 3.5 11/14/2024     CBC (11/14/2024): WBCs: 5.97; HgB: 13.1; Hct: 39,8; platelets: 124  CBC (08/14/2024): WBCs: 6.68; HgB: 13.9; Hct: 42.2; platelets: 170  CBC (05/21/2024): WBCs: 4.92; HgB: 13.9; Hct: 40.9; platelets: 126  CBC (02/21/2024): WBCs: 6.29; HgB: 14.3; Hct: 42.9; platelets: 150  CBC (12/19/2023): WBCs: 5.64; HgB: 14.5; Hct: 44.2; platelets: 120  CBC (10/19/2023): WBCs: 6.05; HgB: 14.4; Hct: 43.7; platelets: 119  CBC (08/16/2023): WBCs: 5.87; HgB: 14.3; Hct: 44.2; platelets: 120  CBC (05/24/2023): WBCs: 7.45; HgB: 15.8; Hct: 47.0; platelets: 125  CBC (05/01/2023): WBCs: 6.91; HgB: 15.9; Hct: 47.7; platelets: 118  CBC (04/03/2023): WBCs: 6.68; HgB: 15.9; Hct: 45.7; platelets: 116; MCV: 94    AFP (11/14/2024): pending  AFP (09/25/2024): 19.4 ng/mL  AFP (05/21/2024): 9.44 ng/mL  AFP (02/21/2024): 7.07 ng/mL  AFP (12/19/2023): 5.88 ng/mL  AFP (11/09/2023): 6.00 ng.mL  AFP (09/28/2023): 5.55 ng/mL  AFP (08/16/2023):  3.96 ng/mL  AFP (07/06/2023): 3.33 ng/mL  AFP (05/24/2023): 2.66 ng/mL  AFP (05/01/2023): < 2 ng/mL  AFP (01/27/2023): 2.6 ng/mL  AFP (10/31/2022): 1031.0 ng/mL    IMAGING  CT liver (10/21/2022, at ):  Impression: LR 5 segment 7 lesion measuring up to 2.8 x 2.5 cm with associated TIV (tumor in vein). LR 3 legion measuring 8 mm in segment 4A.    CT chest, abdomen and pelvis with contrast (01/27/2023, at ):  Impression:  Chest: No convincing metastatic disease in the thorax.  Abdomen/Pelvis: Within the limits of the study, the area of washout representing the known HCC is stable to smaller in size. The component representing the tumor in vein is smaller in size. No new liver lesions. No distant metastatic disease.    MRI abdomen with and without contrast (04/13/2023):  Impression:  1) No solid arterial phase enhancing liver lesions identified. No delayed phase enhancing liver lesions are noted.  2) Liver cirrhosis and portal hypertension.  3) 0.6 cm simple appearing benign hepatic cyst right lobe.  4) Simple appearing cyst right kidney. No hydronephrosis.  5) Other nonacute findings.    MRI abdomen with and without contrast (11/03/2023, compared to 04/13/2023):  Impression: Little overall change. No contrast-enhancing hepatic lesions are identified.    MRI abdomen with and without contrast (05/14/2024, compared to MRI on 11/03/2023):  Impression:  1) Development of two arterial phase enhancing lesions immediately adjacent to each other localizing to segment 7/8 of the right hepatic lobe worrisome for primary HCC given extensive background changes of liver cirrhosis. Metastatic disease is also within the differential.  2) Stable simple appearing cyst right hepatic lobe.  3) Cholelithiasis.  4) Portal hypertension changes which include presence of splenorenal shunts.  5) Other incidental/nonacute findings.    CT abdomen and pelvis without contrast (06/17/2024, compared to 03/17/2024):  Impression:  1) Advanced liver  cirrhosis.  2) Mild splenomegaly.  3) Prominent portosystemic collateral vessels compatible with portal hypertension. No ascites.  4) Cholelithiasis.  5) Mild constipation. No bowel obstruction.  6) Prior appendectomy and hysterectomy. Other incidental/nonacute findings.    MRI abdomen with and without contrast (08/05/2024, compared to 05/14/2024):  Impression:  1) As noted previously, two, arterial phase enhancing lesions dome of liver segment 7/8 not significantly changed in configuration from the previous exam.  2) Larger lesion is 13.1 mm and was previously 11.7 mm but could be due to differences in technique.  3) The smaller lesion is 7.2 mm and was previously 6.9 mm.  4) Segmental type enhancement is noted in the caudate lobe which may be in part related that is approximately 2.63 cm. Diffusion restriction is noted raising suspicious for separate neoplastic lesion. Area was previously obscured by motion artifact and proximity of the duodenum.  5) Small faby hepatis region lymph nodes are noted one of which is 1.45 cm and a smaller node that is 0.81 cm and were previously too small to characterize.  6) Advanced liver cirrhosis, stable. Changes of portal hypertension are again noted and stable.  7) Cholelithiasis, stable.  8) Simple appearing cyst right lobe liver, stable.    Ultrasound gallbladder (08/31/2024):  Impression:  1) Few small gallstones. Despite a reportedly positive sonographic Zamora's sign, no other sonographic features to suggest cholecystitis    CT abdomen and pelvis with contrast (09/06/2024):  Impression: Focal hepatic observations. Li-Rads-TIV (definitely tumor in vein) and Li-Rads 5 (definite HCC).    MRI abdomen with and without contrast (11/08/2024, compared to 08/05/2024):  Impression:  1) Two lesions again noted in segement 7/8 of the liver that show arterial phase enhancement. Larger lesion is 15.2 mm and was previously 13.1 mm. Smaller lesion is 9.9 mm and was previously 7.2 mm.  Overall increased in size.  2) Size increase of caudate lobe arterial phase enhancing lesion now 34.6 mm and was previously 26.3 mm.  3) Increased size of faby hepatis enhancing enlarged lymph nodes. Larger lymph node is 20.2 mm and was previously 14.5 mm. The smaller adjacent lymph node is 11.2 mm and was previously 8.1 mm.  4) Tiny segment 3 liver lesion is 5.4 mm with arterial phase enhancement and was not previously well characterized due to motion artifact.  5) Increased signal changes noted of the portal vein and more specifically the left portal venous branches suspicious for portal vein thrombosis. Correlate with ultrasound Doppler assessment.  6) Advanced liver cirrhosis with portal hypertension changes, stable.  7) Other nonacute findings stable from previous.    PATHOLOGY    IMPRESSION AND PLAN  Ms. Brooks is a 64 y.o., white female with:  Hepatocellular carcinoma: Initially diagnosed in Fall 2022 after a CT of the liver (performed on 10/21/2022 at , summarized above) confirmed the presence of two lesions (one measuring 2.8 x 2.5 cm in segment 7 and a probable satellite measuring ~8 mm in segment 4A) in the setting of known, baseline cirrhosis and a serum AFP level of > 1000 ng/mL. Due to venous involvement by the tumor, she was/is not a candidate for a liver transplant.  medical oncology therefore recommended initiating first-line, palliative, systemic treatment with a combination of g8povehm bevacizumab (the Avastin biosimilar Mvasi) and c9xcjskh atezolizumab (Tecentriq). She received a total of eight (8), y8sayonc cycles through the [facility] between Fall 2022 and early April 2023 (the eighth and final cycle of both were given on 04/03/2023). With this therapy, MRIs of the abdomen (liver protocol) performed on 04/13/2023 and 11/03/2023 (both summarized above) showed no visible signs of a liver mass at all. Also with the therapy she has received to date, her serum AFP level declined  from >1000 ng/mL in late October 2023 to solidly WNL by January 2023), consistent with a complete response in her disease. I therefore had a long discussion with the patient and her sister in Spring 2023 regarding our treatment recommendations from that point. In short, the potential risks of any additional cycles of Avastin (particularly bleeding; she has an aortic aneurysm in addition to her baseline cirrhosis) likely outweighed any potential, minimal future benefit at that time); however, continuing indefinite, k6upjnyu atezolizumab continued to be recommended (as the current standard of care; it appears to have worked extremely well). She has now completed a total of eighteen (18) cycles (with the eighteenth and, to date, most recent one given in early November 2023). At that time, it was her preference that we place this therapy on indefinite hold, as she was convinced the Lord had healed her again (this time of her HCC); and she believed that the indefinite atezolizumab was causing her chronic, quality-of-life limiting fatigue (and this symptom has been noticeably better since we started holding the atezolizumab ~one year ago). While remaining fully aware of the potential risks of discontinuing this medication, she has done overall clinically well over the course of this past year; however, during that time, intermittent repeat MRIs of the abdomen performed on 05/14/2024, 08/05/2024 and, now, most recently, 11/08/2024 (all summarized above) have shown, and continue to show, slowly, but steadily, relapsing disease, with two arterial phase enhancing lesions immediately adjacent to each other localizing to segment 7/8 now visible, and steadily reenlarging, again. She is now (finally) agreeable to restarting palliative therapy, and doing so now with both the g4fckspv atezolizumab and the t2oobqwh bevacizumab is recommended. Per her preference, we will wait until the week after Thanksgiving to give her the first  "cycle. We will see her back in our clinic six weeks after that, on the day of the 3rd cycle (in ~late 2025), with a CBC, CMP, TSH and AFP.  Cirrhosis: Likely secondary to a longstanding history of both issue #3 (which was diagnosed and reportedly cured in ~2018, probably decades after she initially contracted it through her ) and alcohol abuse (she drank \"a lot\" of beer every day for ~twenty years but quit in the ). Currently still compensated. Ongoing management per gastroenterology/hepatology.  Hepatitis C: Reportedly contracted through her  (who ultimately  from it) without her knowledge, but also reportedly diagnosed and cured with a months-long course of antivirals in ~. Ongoing management per gastroenterology/hepatology.  Protein calorie malnutrition: Recently still much improved. Continue Marinol 2.5 mg PO BID. Continue to monitor.  Gallbladder issues: Due to issue #2, a couple of different surgeons have now recommended againist pursuing a cholecystectomy at this time. Continue to monitor.  Aortic aneurysm: Ongoing monitoring per CT/vascular surgery.  The patient was in agreement with these plans.    It is a pleasure to participate in Ms. Brooks's care. Please do not hesitate to call with any questions or concerns that you may have.    A total of 30 minutes were spent coordinating this patient’s care in clinic today; more than 50% of this time was face-to-face with the patient, reviewing her interim medical history, discussing the results of last week's repeat MRI of the abdomen and counseling on the current treatment and followup plan. All questions were answered to her satisfaction.    FOLLOW UP  Restart [id number] atezolizumab/bevacizumab shortly (the week after , per patient preference). Return to our clinic in ~2.5 months, on the day of the 3rd cycle of atezolizumab/bevacizumab (in ~), with a CBC, CMP, TSH and AFP.            This document " was electronically signed by NADEGE Boss MD November 14, 2024 16:43 EST      CC: DO Henri Davis MD Hao Zhonglin, MD Erika G. Almodovar, MD John H. Chaney, MD

## 2024-11-15 LAB — ALPHA-FETOPROTEIN: 36.6 NG/ML (ref 0–8.3)

## 2024-11-18 ENCOUNTER — TELEPHONE (OUTPATIENT)
Dept: ONCOLOGY | Facility: CLINIC | Age: 64
End: 2024-11-18
Payer: MEDICARE

## 2024-11-18 NOTE — TELEPHONE ENCOUNTER
Caller: Noemy Brooks    Relationship: Self    Best call back number: [phone]     What is the best time to reach you: ASAP TODAY    Who are you requesting to speak with (clinical staff, provider,  specific staff member): CLINICAL        What was the call regarding: PT RECEIVED SOME MEDS THAT SHE WAS NOT EXPECTING, IS NOT SURE WHAT THEY ARE FOR.  PLEASE CALL PT TODAY TO FIND OUT WHAT WAS SENT/RECEIVED AND HOW SHE IS TO TAKE IT, AND TO ADVISE WHEN SHE MAY START INFUSIONS.

## 2024-11-18 NOTE — TELEPHONE ENCOUNTER
RN called and spoke with Noemy in regard to medications that she received from the pharmacy.  Upon talking with Noemy, RN established that she had been given Magic Mouthwash, but the ingredients were separate and had not been pre-mixed at the pharmacy.  Noemy said the pharmacy did not tell her what to do with the medications and she just wanted to clarify.  RN instructed Noemy on what to do with the medications and how to take, once mixed.    Noemy verbalized understanding and had no further questions/concerns at this time.

## 2024-11-26 ENCOUNTER — TELEPHONE (OUTPATIENT)
Dept: ONCOLOGY | Facility: CLINIC | Age: 64
End: 2024-11-26
Payer: MEDICARE

## 2024-11-26 ENCOUNTER — LAB (OUTPATIENT)
Dept: ONCOLOGY | Facility: HOSPITAL | Age: 64
End: 2024-11-26
Payer: MEDICARE

## 2024-11-26 ENCOUNTER — OFFICE VISIT (OUTPATIENT)
Dept: ONCOLOGY | Facility: CLINIC | Age: 64
End: 2024-11-26
Payer: MEDICARE

## 2024-11-26 ENCOUNTER — LAB (OUTPATIENT)
Dept: LAB | Facility: HOSPITAL | Age: 64
End: 2024-11-26
Payer: MEDICARE

## 2024-11-26 VITALS
DIASTOLIC BLOOD PRESSURE: 75 MMHG | WEIGHT: 127.2 LBS | RESPIRATION RATE: 18 BRPM | OXYGEN SATURATION: 93 % | HEART RATE: 81 BPM | BODY MASS INDEX: 19.92 KG/M2 | TEMPERATURE: 97.8 F | SYSTOLIC BLOOD PRESSURE: 134 MMHG

## 2024-11-26 VITALS
RESPIRATION RATE: 18 BRPM | HEART RATE: 81 BPM | DIASTOLIC BLOOD PRESSURE: 75 MMHG | WEIGHT: 127.2 LBS | BODY MASS INDEX: 19.92 KG/M2 | OXYGEN SATURATION: 93 % | TEMPERATURE: 97.8 F | SYSTOLIC BLOOD PRESSURE: 134 MMHG

## 2024-11-26 DIAGNOSIS — C22.0 HEPATOCELLULAR CARCINOMA: Primary | ICD-10-CM

## 2024-11-26 DIAGNOSIS — C22.0 HEPATOCELLULAR CARCINOMA: ICD-10-CM

## 2024-11-26 DIAGNOSIS — R10.11 RUQ PAIN: ICD-10-CM

## 2024-11-26 DIAGNOSIS — R41.0 CONFUSION: ICD-10-CM

## 2024-11-26 DIAGNOSIS — F41.9 ANXIETY: ICD-10-CM

## 2024-11-26 LAB
ALBUMIN SERPL-MCNC: 3.8 G/DL (ref 3.5–5.2)
ALBUMIN/GLOB SERPL: 1.2 G/DL
ALP SERPL-CCNC: 107 U/L (ref 39–117)
ALT SERPL W P-5'-P-CCNC: 28 U/L (ref 1–33)
AMMONIA BLD-SCNC: 50 UMOL/L (ref 11–51)
ANION GAP SERPL CALCULATED.3IONS-SCNC: 12.3 MMOL/L (ref 5–15)
AST SERPL-CCNC: 40 U/L (ref 1–32)
BASOPHILS # BLD AUTO: 0.06 10*3/MM3 (ref 0–0.2)
BASOPHILS NFR BLD AUTO: 1.1 % (ref 0–1.5)
BILIRUB SERPL-MCNC: 0.5 MG/DL (ref 0–1.2)
BUN SERPL-MCNC: 9 MG/DL (ref 8–23)
BUN/CREAT SERPL: 12 (ref 7–25)
CALCIUM SPEC-SCNC: 9.5 MG/DL (ref 8.6–10.5)
CHLORIDE SERPL-SCNC: 106 MMOL/L (ref 98–107)
CO2 SERPL-SCNC: 22.7 MMOL/L (ref 22–29)
CREAT SERPL-MCNC: 0.75 MG/DL (ref 0.57–1)
DEPRECATED RDW RBC AUTO: 49.5 FL (ref 37–54)
EGFRCR SERPLBLD CKD-EPI 2021: 87.9 ML/MIN/1.73
EOSINOPHIL # BLD AUTO: 0.38 10*3/MM3 (ref 0–0.4)
EOSINOPHIL NFR BLD AUTO: 7.1 % (ref 0.3–6.2)
ERYTHROCYTE [DISTWIDTH] IN BLOOD BY AUTOMATED COUNT: 13.4 % (ref 12.3–15.4)
GLOBULIN UR ELPH-MCNC: 3.2 GM/DL
GLUCOSE SERPL-MCNC: 109 MG/DL (ref 65–99)
HCT VFR BLD AUTO: 41.1 % (ref 34–46.6)
HGB BLD-MCNC: 13.7 G/DL (ref 12–15.9)
IMM GRANULOCYTES # BLD AUTO: 0.01 10*3/MM3 (ref 0–0.05)
IMM GRANULOCYTES NFR BLD AUTO: 0.2 % (ref 0–0.5)
LYMPHOCYTES # BLD AUTO: 1.4 10*3/MM3 (ref 0.7–3.1)
LYMPHOCYTES NFR BLD AUTO: 26.2 % (ref 19.6–45.3)
MCH RBC QN AUTO: 33.4 PG (ref 26.6–33)
MCHC RBC AUTO-ENTMCNC: 33.3 G/DL (ref 31.5–35.7)
MCV RBC AUTO: 100.2 FL (ref 79–97)
MONOCYTES # BLD AUTO: 0.61 10*3/MM3 (ref 0.1–0.9)
MONOCYTES NFR BLD AUTO: 11.4 % (ref 5–12)
NEUTROPHILS NFR BLD AUTO: 2.89 10*3/MM3 (ref 1.7–7)
NEUTROPHILS NFR BLD AUTO: 54 % (ref 42.7–76)
NRBC BLD AUTO-RTO: 0 /100 WBC (ref 0–0.2)
PLATELET # BLD AUTO: 140 10*3/MM3 (ref 140–450)
PMV BLD AUTO: 9.4 FL (ref 6–12)
POTASSIUM SERPL-SCNC: 3.2 MMOL/L (ref 3.5–5.2)
PROT SERPL-MCNC: 7 G/DL (ref 6–8.5)
RBC # BLD AUTO: 4.1 10*6/MM3 (ref 3.77–5.28)
SODIUM SERPL-SCNC: 141 MMOL/L (ref 136–145)
URATE SERPL-MCNC: 4.3 MG/DL (ref 2.4–5.7)
WBC NRBC COR # BLD AUTO: 5.35 10*3/MM3 (ref 3.4–10.8)

## 2024-11-26 PROCEDURE — 82140 ASSAY OF AMMONIA: CPT

## 2024-11-26 PROCEDURE — 1159F MED LIST DOCD IN RCRD: CPT | Performed by: NURSE PRACTITIONER

## 2024-11-26 PROCEDURE — 1160F RVW MEDS BY RX/DR IN RCRD: CPT | Performed by: NURSE PRACTITIONER

## 2024-11-26 PROCEDURE — 80053 COMPREHEN METABOLIC PANEL: CPT

## 2024-11-26 PROCEDURE — 85025 COMPLETE CBC W/AUTO DIFF WBC: CPT

## 2024-11-26 PROCEDURE — 99214 OFFICE O/P EST MOD 30 MIN: CPT | Performed by: NURSE PRACTITIONER

## 2024-11-26 PROCEDURE — 84550 ASSAY OF BLOOD/URIC ACID: CPT

## 2024-11-26 PROCEDURE — 1126F AMNT PAIN NOTED NONE PRSNT: CPT | Performed by: NURSE PRACTITIONER

## 2024-11-26 PROCEDURE — 36415 COLL VENOUS BLD VENIPUNCTURE: CPT

## 2024-11-26 NOTE — PROGRESS NOTES
DATE:  11/26/2024    DIAGNOSIS: Hepatocellular Carcinoma    CHIEF COMPLAINT:  Confusion, Anxiety    Interval History:  Ms. Brooks follows with Dr. Boss for hepatocellular carcinoma and is being seen today for an acute visit.  She reports that earlier this week she went to post office and had multiple cards and put the wrong item in one of the cards and then this morning after fixing her a cup of coffee instead of putting sugar in her coffee she picked up the container of coffee and started to put fresh grounds in her cup. At the present time, she is alert and oriented x 3. She is very anxious one minute and tearful the next. She reports headaches which is new. She denies nausea/vomiting or any other neurological changes. She also reports RUQ pain that is not new. She does have oxycodone 5/325 mg which she states that she does not take. She is without any other complaints at this time.    The following portions of the patient's history were reviewed and updated as appropriate: allergies, current medications, past family history, past medical history, past social history, past surgical history and problem list.    PAST MEDICAL HISTORY:  Past Medical History:   Diagnosis Date    Acid reflux     Aortic aneurysm     Cancer     LIVER, UTERINE    Cirrhosis     COPD (chronic obstructive pulmonary disease)     Depression     Disease of thyroid gland     Elevated cholesterol     High cholesterol     History of transfusion     Hypertension     Infectious viral hepatitis     2019    Osteoporosis     Ovarian cancer        PAST SURGICAL HISTORY:  Past Surgical History:   Procedure Laterality Date    APPENDECTOMY N/A     COLONOSCOPY      ENDOSCOPY N/A 8/8/2023    Procedure: ESOPHAGOGASTRODUODENOSCOPY WITH BIOPSY;  Surgeon: Rahel Sesay MD;  Location: [facility];  Service: Gastroenterology;  Laterality: N/A;    FRACTURE SURGERY Left     ARM, HAS 2 SCREWS    HYSTERECTOMY      UPPER GASTROINTESTINAL ENDOSCOPY          SOCIAL HISTORY:  Social History     Socioeconomic History    Marital status:     Number of children: 3   Tobacco Use    Smoking status: Former     Current packs/day: 0.00     Average packs/day: 1.5 packs/day for 25.0 years (37.5 ttl pk-yrs)     Types: Cigarettes     Start date: 1990     Quit date: 2015     Years since quittin.5    Smokeless tobacco: Never    Tobacco comments:     smoked for approx 20 years, 1.5 to 2 ppd   Vaping Use    Vaping status: Never Used   Substance and Sexual Activity    Alcohol use: No     Comment: she previously drank daily for 16 years, beer    Drug use: No    Sexual activity: Defer                  FAMILY HISTORY:  Family History   Problem Relation Age of Onset    Stroke Mother     Hypertension Mother     COPD Mother     Throat cancer Mother     Arthritis Mother     Asthma Mother     Heart attack Mother     Stroke Father     Cancer Father     Liver cancer Father          MEDICATIONS:  The current medication list was reviewed in the EMR    Current Outpatient Medications:     albuterol sulfate  (90 Base) MCG/ACT inhaler, Inhale 2 puffs Every 4 (Four) Hours As Needed for Wheezing., Disp: , Rfl:     Atezolizumab (TECENTRIQ IV), Infuse  into a venous catheter Every 21 (Twenty-One) Days. (Patient not taking: Reported on 2024), Disp: , Rfl:     B Complex Vitamins (VITAMIN B COMPLEX PO), Take  by mouth., Disp: , Rfl:     budesonide-formoterol (SYMBICORT) 160-4.5 MCG/ACT inhaler, Inhale 2 puffs 2 (Two) Times a Day., Disp: , Rfl:     cholecalciferol (VITAMIN D3) 25 MCG (1000 UT) tablet, Take 1 tablet by mouth Daily., Disp: , Rfl:     cyclobenzaprine (FLEXERIL) 5 MG tablet, Take 1 tablet by mouth 3 (Three) Times a Day As Needed for Muscle Spasms. (Patient not taking: Reported on 2024), Disp: , Rfl:     diphenhydrAMINE 12.5 MG/5ML elixir 20 mL, aluminum-magnesium hydroxide-simethicone 400-400-40 MG/5ML suspension 20 mL, Lidocaine Viscous HCl 2 % solution 20  mL, Swish and spit 15 mL Every 4 (Four) Hours As Needed. (Patient not taking: Reported on 11/14/2024), Disp: , Rfl:     dronabinol (Marinol) 2.5 MG capsule, Take 1 capsule by mouth 2 (Two) Times a Day Before Meals. (Patient not taking: Reported on 11/14/2024), Disp: 60 capsule, Rfl: 2    levothyroxine (SYNTHROID, LEVOTHROID) 25 MCG tablet, TAKE ONE (1) TABLET BY MOUTH EVERY MORNING. (Patient not taking: Reported on 11/14/2024), Disp: 30 tablet, Rfl: 5    lisinopril (PRINIVIL,ZESTRIL) 30 MG tablet, Take 1 tablet by mouth Daily. (Patient not taking: Reported on 11/14/2024), Disp: , Rfl:     lisinopril-hydrochlorothiazide (PRINZIDE,ZESTORETIC) 20-12.5 MG per tablet, Take 1 tablet by mouth Daily. (Patient not taking: Reported on 11/14/2024), Disp: , Rfl:     Magnesium Hydroxide (MILK OF MAGNESIA PO), Take  by mouth., Disp: , Rfl:     multivitamin (MULTI VITAMIN DAILY PO), Take 1 tablet by mouth Daily., Disp: , Rfl:     omeprazole (priLOSEC) 20 MG capsule, , Disp: , Rfl:     ondansetron ODT (ZOFRAN-ODT) 4 MG disintegrating tablet, Place 1 tablet on the tongue Every 6 (Six) Hours As Needed for Nausea or Vomiting. (Patient not taking: Reported on 11/14/2024), Disp: 12 tablet, Rfl: 0    oxyCODONE (Roxicodone) 5 MG immediate release tablet, Take 1 tablet by mouth Every 6 (Six) Hours As Needed for Severe Pain., Disp: 10 tablet, Rfl: 0    pantoprazole (PROTONIX) 40 MG EC tablet, Take 1 tablet by mouth Daily., Disp: 30 tablet, Rfl: 0    traZODone (DESYREL) 100 MG tablet, Take 1 tablet by mouth Every Night., Disp: , Rfl:     Trelegy Ellipta 200-62.5-25 MCG/ACT inhaler, , Disp: , Rfl:     ALLERGIES:    Allergies   Allergen Reactions    Codeine GI Intolerance         REVIEW OF SYSTEMS:    A comprehensive 14 point review of systems was performed.  Significant findings as mentioned above.  All other systems reviewed and are negative.        Physical Exam   Vital Signs:   Vitals:    11/26/24 1440   BP: 134/75   Pulse: 81   Resp: 18    Temp: 97.8 °F (36.6 °C)   SpO2: 93%    BMI is within normal parameters. No other follow-up for BMI required.    General: Well developed, well nourished, alert and oriented x 3, in no acute distress. Appears anxious.  Head: ATNC   Eyes: PERRL, No evidence of conjunctivitis.   Nose: No nasal discharge.   Mouth: Oral mucosal membranes moist. No oral ulceration or hemorrhages.   Neck: Neck supple. No thyromegaly. No JVD.   Lungs: Clear in all fields to A&P without rales, rhonchi or wheezing.   Heart: S1, S2. Regular rate and rhythm. No murmurs, rubs, or gallops.   Abdomen: Soft. Bowel sounds are normoactive. Nontender with palpation.   Extremities: No clubbing, cyanosis or edema bilaterally.   Integumentary: Warm, dry, intact.   Neurologic: Grossly non-focal exam.    Pain Score:  Pain Score    11/26/24 1440   PainSc: 0-No pain           RECENT LABS:  Lab Results   Component Value Date    WBC 5.35 11/26/2024    HGB 13.7 11/26/2024    HCT 41.1 11/26/2024    .2 (H) 11/26/2024    RDW 13.4 11/26/2024     11/26/2024    NEUTRORELPCT 54.0 11/26/2024    LYMPHORELPCT 26.2 11/26/2024    MONORELPCT 11.4 11/26/2024    EOSRELPCT 7.1 (H) 11/26/2024    BASORELPCT 1.1 11/26/2024    NEUTROABS 2.89 11/26/2024    LYMPHSABS 1.40 11/26/2024       Lab Results   Component Value Date     11/14/2024    K 3.9 11/14/2024    CO2 22.3 11/14/2024     (H) 11/14/2024    BUN 10 11/14/2024    CREATININE 0.63 11/14/2024    EGFRIFNONA 102 08/07/2019    GLUCOSE 119 (H) 11/14/2024    CALCIUM 9.3 11/14/2024    ALKPHOS 110 11/14/2024    AST 43 (H) 11/14/2024    ALT 32 11/14/2024    BILITOT 0.6 11/14/2024    ALBUMIN 3.5 11/14/2024    PROTEINTOT 6.9 11/14/2024         ASSESSMENT & PLAN:  Noemy Brooks is a very pleasant 64 y.o. female with    1.  Hepatocellular Carcinoma  - Please see Dr. Boss's most recent follow up note from 11/14/2024 for full details regarding oncology history. She is being seen today for an acute visit (see  below). She will follow up as previously planned.     2. Confusion  - She reports that earlier this week she went to post office and had multiple cards and put the wrong item in one of the cards and then this morning after fixing her a cup of coffee instead of putting sugar in her coffee she picked up the container of coffee and started to put fresh grounds in her cup. At the present time, she is alert and oriented x 3. She is very anxious one minute and tearful the next. She reports headaches which is new. She denies nausea/vomiting or any other neurological changes.  - Recommended MRI Brain and patient adamantly declines.  - Obtained CMP which showed stable AST with otherwise normal LFT's. Ammonia and Uric Acid was normal. CBC showed normalized counts.   - Will continue to monitor.    3. Anxiety  - Strongly advised patient to follow up with PCP ASAP for further management.     4. RUQ Pain  - Likely secondary to recent disease progression along with long-standing gallbladder issues and cirrhosis likely contributing. Recommended to continue Oxycodone as needed.   - She is scheduled to start Atezolizubmab and Bevacizumab on 12/05/2024 which will likely help with pain management.         I spent 30 minutes caring for Noemy on this date of service. This time includes time spent by me in the following activities: preparing for the visit, reviewing tests, performing a medically appropriate examination and/or evaluation, counseling and educating the patient/family/caregiver, ordering medications, tests, or procedures, referring and communicating with other health care professionals, documenting information in the medical record, independently interpreting results and communicating that information with the patient/family/caregiver, and care coordination          Electronically Signed by: VIRGEN Calvin , VIRGEN November 26, 2024 14:58 EST       CC:   No ref. provider found  Haley Bailey DO

## 2024-11-26 NOTE — TELEPHONE ENCOUNTER
RN returned patient's call.   Patient complains of confusion for two days, worsening today. She elaborates that specific events included labeling mail incorrectly multiple times (switching up addresses as she is mailing out to multiple people), and pouring her coffee incorrectly this morning (pouring coffee grounds into her cup instead of her brewed coffee). She also started to have uncontrollable shakes. Patient also complains of right sided pain below her ribs, and sensitivity/pain when lying on her right side. RN to discuss with Dr. Boss.

## 2024-11-26 NOTE — TELEPHONE ENCOUNTER
Patient is having a lot of mental confusion and is having issues completing daily tasks. Has also started shaking and is having pain on her right side by her ribs. Pain started about a week ago and shaking has been worse today than any other day.

## 2024-11-26 NOTE — TELEPHONE ENCOUNTER
RN discussed with Dr. Boss who recommends acute visit and verbally ordered CBC, CMP, Ammonia, and Uric Acid.    RN called patient back and offered visit today, the earliest she can come in is 3:30pm. RN to send out message to get patient scheduled.

## 2024-12-05 ENCOUNTER — INFUSION (OUTPATIENT)
Dept: ONCOLOGY | Facility: HOSPITAL | Age: 64
End: 2024-12-05
Payer: MEDICARE

## 2024-12-05 ENCOUNTER — LAB (OUTPATIENT)
Dept: ONCOLOGY | Facility: HOSPITAL | Age: 64
End: 2024-12-05
Payer: MEDICARE

## 2024-12-05 VITALS
RESPIRATION RATE: 18 BRPM | HEART RATE: 94 BPM | TEMPERATURE: 98 F | OXYGEN SATURATION: 93 % | WEIGHT: 125.5 LBS | SYSTOLIC BLOOD PRESSURE: 131 MMHG | DIASTOLIC BLOOD PRESSURE: 65 MMHG | BODY MASS INDEX: 19.65 KG/M2

## 2024-12-05 DIAGNOSIS — C22.0 HEPATOCELLULAR CARCINOMA: Primary | ICD-10-CM

## 2024-12-05 DIAGNOSIS — C22.0 HEPATOCELLULAR CARCINOMA: ICD-10-CM

## 2024-12-05 LAB
ALBUMIN SERPL-MCNC: 3.7 G/DL (ref 3.5–5.2)
ALBUMIN/GLOB SERPL: 1.2 G/DL
ALP SERPL-CCNC: 90 U/L (ref 39–117)
ALT SERPL W P-5'-P-CCNC: 25 U/L (ref 1–33)
ANION GAP SERPL CALCULATED.3IONS-SCNC: 9.2 MMOL/L (ref 5–15)
AST SERPL-CCNC: 36 U/L (ref 1–32)
BASOPHILS # BLD AUTO: 0.08 10*3/MM3 (ref 0–0.2)
BASOPHILS NFR BLD AUTO: 1.4 % (ref 0–1.5)
BILIRUB SERPL-MCNC: 0.8 MG/DL (ref 0–1.2)
BILIRUB UR QL STRIP: NEGATIVE
BUN SERPL-MCNC: 10 MG/DL (ref 8–23)
BUN/CREAT SERPL: 13.7 (ref 7–25)
CALCIUM SPEC-SCNC: 9.9 MG/DL (ref 8.6–10.5)
CHLORIDE SERPL-SCNC: 109 MMOL/L (ref 98–107)
CLARITY UR: CLEAR
CO2 SERPL-SCNC: 24.8 MMOL/L (ref 22–29)
COLOR UR: ABNORMAL
CREAT SERPL-MCNC: 0.73 MG/DL (ref 0.57–1)
DEPRECATED RDW RBC AUTO: 51.1 FL (ref 37–54)
EGFRCR SERPLBLD CKD-EPI 2021: 90.8 ML/MIN/1.73
EOSINOPHIL # BLD AUTO: 0.43 10*3/MM3 (ref 0–0.4)
EOSINOPHIL NFR BLD AUTO: 7.6 % (ref 0.3–6.2)
ERYTHROCYTE [DISTWIDTH] IN BLOOD BY AUTOMATED COUNT: 13.8 % (ref 12.3–15.4)
GLOBULIN UR ELPH-MCNC: 3.2 GM/DL
GLUCOSE SERPL-MCNC: 92 MG/DL (ref 65–99)
GLUCOSE UR STRIP-MCNC: NEGATIVE MG/DL
HCT VFR BLD AUTO: 44.6 % (ref 34–46.6)
HGB BLD-MCNC: 14.8 G/DL (ref 12–15.9)
HGB UR QL STRIP.AUTO: NEGATIVE
IMM GRANULOCYTES # BLD AUTO: 0.02 10*3/MM3 (ref 0–0.05)
IMM GRANULOCYTES NFR BLD AUTO: 0.4 % (ref 0–0.5)
KETONES UR QL STRIP: NEGATIVE
LEUKOCYTE ESTERASE UR QL STRIP.AUTO: ABNORMAL
LYMPHOCYTES # BLD AUTO: 1.33 10*3/MM3 (ref 0.7–3.1)
LYMPHOCYTES NFR BLD AUTO: 23.6 % (ref 19.6–45.3)
MCH RBC QN AUTO: 33.3 PG (ref 26.6–33)
MCHC RBC AUTO-ENTMCNC: 33.2 G/DL (ref 31.5–35.7)
MCV RBC AUTO: 100.2 FL (ref 79–97)
MONOCYTES # BLD AUTO: 0.69 10*3/MM3 (ref 0.1–0.9)
MONOCYTES NFR BLD AUTO: 12.3 % (ref 5–12)
NEUTROPHILS NFR BLD AUTO: 3.08 10*3/MM3 (ref 1.7–7)
NEUTROPHILS NFR BLD AUTO: 54.7 % (ref 42.7–76)
NITRITE UR QL STRIP: NEGATIVE
NRBC BLD AUTO-RTO: 0 /100 WBC (ref 0–0.2)
PH UR STRIP.AUTO: 6 [PH] (ref 5–8)
PLATELET # BLD AUTO: 138 10*3/MM3 (ref 140–450)
PMV BLD AUTO: 9.7 FL (ref 6–12)
POTASSIUM SERPL-SCNC: 3.4 MMOL/L (ref 3.5–5.2)
PROT SERPL-MCNC: 6.9 G/DL (ref 6–8.5)
PROT UR QL STRIP: NEGATIVE
RBC # BLD AUTO: 4.45 10*6/MM3 (ref 3.77–5.28)
SODIUM SERPL-SCNC: 143 MMOL/L (ref 136–145)
SP GR UR STRIP: 1.02 (ref 1–1.03)
T4 FREE SERPL-MCNC: 0.8 NG/DL (ref 0.92–1.68)
TSH SERPL DL<=0.05 MIU/L-ACNC: 2.6 UIU/ML (ref 0.27–4.2)
UROBILINOGEN UR QL STRIP: ABNORMAL
WBC NRBC COR # BLD AUTO: 5.63 10*3/MM3 (ref 3.4–10.8)

## 2024-12-05 PROCEDURE — 25810000003 SODIUM CHLORIDE 0.9 % SOLUTION 250 ML FLEX CONT: Performed by: INTERNAL MEDICINE

## 2024-12-05 PROCEDURE — 84439 ASSAY OF FREE THYROXINE: CPT

## 2024-12-05 PROCEDURE — 85025 COMPLETE CBC W/AUTO DIFF WBC: CPT

## 2024-12-05 PROCEDURE — 81003 URINALYSIS AUTO W/O SCOPE: CPT

## 2024-12-05 PROCEDURE — 84443 ASSAY THYROID STIM HORMONE: CPT

## 2024-12-05 PROCEDURE — 96417 CHEMO IV INFUS EACH ADDL SEQ: CPT

## 2024-12-05 PROCEDURE — 25010000002 ATEZOLIZUMAB 1200 MG/20ML SOLUTION 20 ML VIAL: Performed by: INTERNAL MEDICINE

## 2024-12-05 PROCEDURE — 80053 COMPREHEN METABOLIC PANEL: CPT

## 2024-12-05 PROCEDURE — 96413 CHEMO IV INFUSION 1 HR: CPT

## 2024-12-05 PROCEDURE — 25010000002 BEVACIZUMAB PER 10 MG: Performed by: INTERNAL MEDICINE

## 2024-12-05 RX ORDER — SODIUM CHLORIDE 9 MG/ML
20 INJECTION, SOLUTION INTRAVENOUS ONCE
Status: DISCONTINUED | OUTPATIENT
Start: 2024-12-05 | End: 2024-12-05 | Stop reason: RX

## 2024-12-05 RX ORDER — POTASSIUM CHLORIDE 1500 MG/1
40 TABLET, EXTENDED RELEASE ORAL ONCE
Status: COMPLETED | OUTPATIENT
Start: 2024-12-05 | End: 2024-12-05

## 2024-12-05 RX ADMIN — BEVACIZUMAB 800 MG: 400 INJECTION, SOLUTION INTRAVENOUS at 13:46

## 2024-12-05 RX ADMIN — POTASSIUM CHLORIDE 40 MEQ: 1500 TABLET, EXTENDED RELEASE ORAL at 12:44

## 2024-12-05 RX ADMIN — SODIUM CHLORIDE 1200 MG: 9 INJECTION, SOLUTION INTRAVENOUS at 12:47

## 2024-12-06 ENCOUNTER — DOCUMENTATION (OUTPATIENT)
Dept: ONCOLOGY | Facility: CLINIC | Age: 64
End: 2024-12-06
Payer: MEDICARE

## 2024-12-06 NOTE — PROGRESS NOTES
SS spoke with patient to follow up on psychosocial assessment. The role of SS was introduced and contact information was provided.     Please refer to the Social Work flowsheet for assessment details.    SS provided patient with contact information and encouraged patient to call with any questions, concerns, or needs.     Patient verbalized understanding and agreed with resource assistance and plan.    SS will follow and assist as needed.

## 2024-12-09 ENCOUNTER — TELEPHONE (OUTPATIENT)
Dept: ONCOLOGY | Facility: CLINIC | Age: 64
End: 2024-12-09
Payer: MEDICARE

## 2024-12-09 ENCOUNTER — NURSE TRIAGE (OUTPATIENT)
Dept: CALL CENTER | Facility: HOSPITAL | Age: 64
End: 2024-12-09
Payer: MEDICARE

## 2024-12-09 NOTE — TELEPHONE ENCOUNTER
"Patient c/o nausea and weakness that started this morning. Had radiation last week. 1 episode of vomiting this morning. Reviewed protocol with patient. Advised to call oncologist. Patient verbalizes agreement with plan.    Reason for Disposition   [1] MODERATE weakness (i.e., interferes with work, school, normal activities) AND [2] cause unknown  (Exceptions: Weakness from acute minor illness or poor fluid intake; weakness is chronic and not worse.)    Additional Information   Negative: SEVERE difficulty breathing (e.g., struggling for each breath, speaks in single words)   Negative: Shock suspected (e.g., cold/pale/clammy skin, too weak to stand, low BP, rapid pulse)   Negative: Difficult to awaken or acting confused (e.g., disoriented, slurred speech)   Negative: [1] Fainted > 15 minutes ago AND [2] still feels too weak or dizzy to stand   Negative: [1] SEVERE weakness (i.e., unable to walk or barely able to walk, requires support) AND [2] new-onset or worsening   Negative: Sounds like a life-threatening emergency to the triager   Negative: Weakness of the face, arm or leg on one side of the body   Negative: [1] Diabetes mellitus AND [2] weakness from low blood sugar (i.e., < 60 mg/dl or 3.5 mmol/l)   Negative: Heat exhaustion suspected (i.e., dehydration from heat exposure)   Negative: Chest pain   Negative: Vomiting is main symptom   Negative: Diarrhea is main symptom   Negative: Difficulty breathing   Negative: Heart beating < 50 beats per minute OR > 140 beats per minute   Negative: Extra heartbeats, irregular heart beating, or heart is beating very fast  (i.e., \"palpitations\")   Negative: Follows large amount of bleeding (e.g., from vomiting, rectum, vagina  (Exception: Small brief weakness from sight of a small amount blood.)   Negative: Black or tarry bowel movements   Negative: [1] Drinking very little AND [2] dehydration suspected (e.g., no urine > 12 hours, very dry mouth, very lightheaded)   Negative: " "Patient sounds very sick or weak to the triager    Answer Assessment - Initial Assessment Questions  1. DESCRIPTION: \"Describe how you are feeling.\"      Weak, nausea  2. SEVERITY: \"How bad is it?\"  \"Can you stand and walk?\"    - MILD (0-3): Feels weak or tired, but does not interfere with work, school or normal activities.    - MODERATE (4-7): Able to stand and walk; weakness interferes with work, school, or normal activities.    - SEVERE (8-10): Unable to stand or walk; unable to do usual activities.      Moderate  3. ONSET: \"When did these symptoms begin?\" (e.g., hours, days, weeks, months)      This morning  4. CAUSE: \"What do you think is causing the weakness or fatigue?\" (e.g., not drinking enough fluids, medical problem, trouble sleeping)      Chemotherapy   5. NEW MEDICINES:  \"Have you started on any new medicines recently?\" (e.g., opioid pain medicines, benzodiazepines, muscle relaxants, antidepressants, antihistamines, neuroleptics, beta blockers)      No   6. OTHER SYMPTOMS: \"Do you have any other symptoms?\" (e.g., chest pain, fever, cough, SOB, vomiting, diarrhea, bleeding, other areas of pain)      1 episode of vomiting, sweating  7. PREGNANCY: \"Is there any chance you are pregnant?\" \"When was your last menstrual period?\"      No    Protocols used: Weakness (Generalized) and Fatigue-ADULT-AH    "

## 2024-12-10 ENCOUNTER — LAB (OUTPATIENT)
Dept: ONCOLOGY | Facility: HOSPITAL | Age: 64
End: 2024-12-10
Payer: MEDICARE

## 2024-12-10 ENCOUNTER — OFFICE VISIT (OUTPATIENT)
Dept: ONCOLOGY | Facility: CLINIC | Age: 64
End: 2024-12-10
Payer: MEDICARE

## 2024-12-10 ENCOUNTER — DOCUMENTATION (OUTPATIENT)
Dept: ONCOLOGY | Facility: CLINIC | Age: 64
End: 2024-12-10
Payer: MEDICARE

## 2024-12-10 VITALS
BODY MASS INDEX: 20.14 KG/M2 | TEMPERATURE: 97.5 F | HEART RATE: 84 BPM | RESPIRATION RATE: 20 BRPM | SYSTOLIC BLOOD PRESSURE: 169 MMHG | DIASTOLIC BLOOD PRESSURE: 98 MMHG | OXYGEN SATURATION: 91 % | WEIGHT: 128.6 LBS

## 2024-12-10 DIAGNOSIS — R53.83 OTHER FATIGUE: ICD-10-CM

## 2024-12-10 DIAGNOSIS — R53.1 GENERALIZED WEAKNESS: ICD-10-CM

## 2024-12-10 DIAGNOSIS — C22.0 HEPATOCELLULAR CARCINOMA: ICD-10-CM

## 2024-12-10 DIAGNOSIS — F41.9 ANXIETY: ICD-10-CM

## 2024-12-10 DIAGNOSIS — C22.0 HEPATOCELLULAR CARCINOMA: Primary | ICD-10-CM

## 2024-12-10 LAB
ALBUMIN SERPL-MCNC: 3.6 G/DL (ref 3.5–5.2)
ALBUMIN/GLOB SERPL: 1.1 G/DL
ALP SERPL-CCNC: 88 U/L (ref 39–117)
ALT SERPL W P-5'-P-CCNC: 24 U/L (ref 1–33)
ANION GAP SERPL CALCULATED.3IONS-SCNC: 11 MMOL/L (ref 5–15)
AST SERPL-CCNC: 36 U/L (ref 1–32)
BASOPHILS # BLD AUTO: 0.08 10*3/MM3 (ref 0–0.2)
BASOPHILS NFR BLD AUTO: 1.5 % (ref 0–1.5)
BILIRUB SERPL-MCNC: 0.5 MG/DL (ref 0–1.2)
BUN SERPL-MCNC: 9 MG/DL (ref 8–23)
BUN/CREAT SERPL: 13.2 (ref 7–25)
CALCIUM SPEC-SCNC: 9.2 MG/DL (ref 8.6–10.5)
CHLORIDE SERPL-SCNC: 109 MMOL/L (ref 98–107)
CO2 SERPL-SCNC: 24 MMOL/L (ref 22–29)
CREAT SERPL-MCNC: 0.68 MG/DL (ref 0.57–1)
DEPRECATED RDW RBC AUTO: 49.7 FL (ref 37–54)
EGFRCR SERPLBLD CKD-EPI 2021: 96.2 ML/MIN/1.73
EOSINOPHIL # BLD AUTO: 0.52 10*3/MM3 (ref 0–0.4)
EOSINOPHIL NFR BLD AUTO: 9.8 % (ref 0.3–6.2)
ERYTHROCYTE [DISTWIDTH] IN BLOOD BY AUTOMATED COUNT: 13.6 % (ref 12.3–15.4)
GLOBULIN UR ELPH-MCNC: 3.2 GM/DL
GLUCOSE SERPL-MCNC: 122 MG/DL (ref 65–99)
HCT VFR BLD AUTO: 41.7 % (ref 34–46.6)
HGB BLD-MCNC: 14.2 G/DL (ref 12–15.9)
IMM GRANULOCYTES # BLD AUTO: 0.01 10*3/MM3 (ref 0–0.05)
IMM GRANULOCYTES NFR BLD AUTO: 0.2 % (ref 0–0.5)
LYMPHOCYTES # BLD AUTO: 1.45 10*3/MM3 (ref 0.7–3.1)
LYMPHOCYTES NFR BLD AUTO: 27.3 % (ref 19.6–45.3)
MCH RBC QN AUTO: 33.9 PG (ref 26.6–33)
MCHC RBC AUTO-ENTMCNC: 34.1 G/DL (ref 31.5–35.7)
MCV RBC AUTO: 99.5 FL (ref 79–97)
MONOCYTES # BLD AUTO: 0.6 10*3/MM3 (ref 0.1–0.9)
MONOCYTES NFR BLD AUTO: 11.3 % (ref 5–12)
NEUTROPHILS NFR BLD AUTO: 2.65 10*3/MM3 (ref 1.7–7)
NEUTROPHILS NFR BLD AUTO: 49.9 % (ref 42.7–76)
NRBC BLD AUTO-RTO: 0 /100 WBC (ref 0–0.2)
PLATELET # BLD AUTO: 119 10*3/MM3 (ref 140–450)
PMV BLD AUTO: 9.6 FL (ref 6–12)
POTASSIUM SERPL-SCNC: 3.5 MMOL/L (ref 3.5–5.2)
PROT SERPL-MCNC: 6.8 G/DL (ref 6–8.5)
RBC # BLD AUTO: 4.19 10*6/MM3 (ref 3.77–5.28)
SODIUM SERPL-SCNC: 144 MMOL/L (ref 136–145)
WBC NRBC COR # BLD AUTO: 5.31 10*3/MM3 (ref 3.4–10.8)

## 2024-12-10 PROCEDURE — 85025 COMPLETE CBC W/AUTO DIFF WBC: CPT

## 2024-12-10 PROCEDURE — 80053 COMPREHEN METABOLIC PANEL: CPT

## 2024-12-10 NOTE — PROGRESS NOTES
"Date: 12/10/2024    Patient Name: Noemy Brooks   : 1960   ID: [id number]    Diagnosis: Hepatocellular carcinoma    Treatment History: Please refer to Dr. Boss's last office note (2024) for full oncology and treatment history. She is being seen today for an acute visit. She recently resumed treatment with Atezolizumab and Bevacizumab on 2024.    Complaint: Generalized weakness, fatigue, intermittent nausea    Interim History:  Ms. Brooks currently follows with Dr. Boss for the treatment of hepatocellular carcinoma. She recently resumed systemic treatment due to disease progression after treatment holiday with Atezolizumab and Bevacizumab. Patient states that she began to have fatigue, generalized weakness around 2 days ago. She states that she has \"been laying around all of the time\". She also reports intermittent episodes of nausea. She states that last episode was yesterday and she took Zofran without improvement. She then had one episode of vomiting and states that she is feeling better in this regard now. She is anxious regarding her decrease in energy level. She reports good appetite and good hydration. She is otherwise without specific complaints.     VS:   /98   Pulse 84   Temp 97.5 °F (36.4 °C) (Temporal)   Resp 20   Wt 58.3 kg (128 lb 9.6 oz)   SpO2 91%   BMI 20.14 kg/m²      PHQ-Score Total:  PHQ-9 Total Score:       Review of Systems  A comprehensive 14 point review of systems was conducted with patient and positive as per HPI otherwise negative.    Physical Exam:  General:  Awake, alert and oriented, in no distress  HEENT:  Pupils are equal, round and reactive to light and accommodation  Neck:  No JVD, thyromegaly or lymphadenopathy  CV:  Regular rate and rhythm, no murmurs, rubs or gallops  Resp:  Lungs are clear to auscultation bilaterally  Abd:  Soft, non-tender, non-distended, bowel sounds present, no organomegaly  Ext:  No clubbing, cyanosis or edema  Lymph:  No " cervical, supraclavicular, axillary, inguinal or femoral adenopathy  Neuro:  MS as above, CN II-XII intact, grossly non-focal exam      Labs:       Lab Results   Component Value Date    WBC 5.31 12/10/2024    HGB 14.2 12/10/2024    HCT 41.7 12/10/2024    MCV 99.5 (H) 12/10/2024    RDW 13.6 12/10/2024     (L) 12/10/2024    NEUTRORELPCT 49.9 12/10/2024    LYMPHORELPCT 27.3 12/10/2024    MONORELPCT 11.3 12/10/2024    EOSRELPCT 9.8 (H) 12/10/2024    BASORELPCT 1.5 12/10/2024    NEUTROABS 2.65 12/10/2024    LYMPHSABS 1.45 12/10/2024       Lab Results   Component Value Date     12/10/2024    K 3.5 12/10/2024    CO2 24.0 12/10/2024     (H) 12/10/2024    BUN 9 12/10/2024    CREATININE 0.68 12/10/2024    GLUCOSE 122 (H) 12/10/2024    CALCIUM 9.2 12/10/2024    ALKPHOS 88 12/10/2024    AST 36 (H) 12/10/2024    ALT 24 12/10/2024    BILITOT 0.5 12/10/2024    ALBUMIN 3.6 12/10/2024    PROTEINTOT 6.8 12/10/2024       ssessment & Plan:  Noemy Brooks is a 64 y.o. female with:    1. Hepatocellular carcinoma  - Please refer to Dr. Boss's last office note (11/14/2024) for full oncology and treatment history. She will follow up with Dr. Boss as planned.    2. Generalized weakness  3. Fatigue  4. Intermittent nausea and vomiting  - Began following resumption of systemic therapy on 12/05/2024.  - She denies infectious symptoms, sick contacts, etc.   - She reports good appetite and adequate hydration.  - Exam and labs today are without cause for concern. Nausea and vomiting are now resolved.  - We discussed that fatigue is a common side effect of cancer treatment and symptoms are likely related to systemic therapy, with peak symptoms occurring for the first 1-3 days after treatment and slowly improve over time. Offered supportive care with IVFs but reports that she is hydrating well. Advised to monitor symptoms and notify of worsening. She is agreeable.       I spent 30 minutes with Noemy Brooks today.  In  the office today, more than 50% of this time was spent face-to-face with her  in counseling / coordination of care, reviewing her interim medical history and counseling on the current treatment plan.  All questions were answered to her satisfaction.                Electronically Signed By: VIRGEN Burrows

## 2024-12-17 ENCOUNTER — DOCUMENTATION (OUTPATIENT)
Dept: ONCOLOGY | Facility: CLINIC | Age: 64
End: 2024-12-17
Payer: MEDICARE

## 2024-12-26 ENCOUNTER — INFUSION (OUTPATIENT)
Dept: ONCOLOGY | Facility: HOSPITAL | Age: 64
End: 2024-12-26
Payer: MEDICARE

## 2024-12-26 ENCOUNTER — LAB (OUTPATIENT)
Dept: ONCOLOGY | Facility: HOSPITAL | Age: 64
End: 2024-12-26
Payer: MEDICARE

## 2024-12-26 VITALS
SYSTOLIC BLOOD PRESSURE: 144 MMHG | DIASTOLIC BLOOD PRESSURE: 78 MMHG | OXYGEN SATURATION: 93 % | HEART RATE: 82 BPM | RESPIRATION RATE: 18 BRPM | BODY MASS INDEX: 19.06 KG/M2 | WEIGHT: 121.7 LBS | TEMPERATURE: 97.7 F

## 2024-12-26 DIAGNOSIS — C22.0 HEPATOCELLULAR CARCINOMA: ICD-10-CM

## 2024-12-26 DIAGNOSIS — C22.0 HEPATOCELLULAR CARCINOMA: Primary | ICD-10-CM

## 2024-12-26 LAB
ALBUMIN SERPL-MCNC: 4 G/DL (ref 3.5–5.2)
ALBUMIN/GLOB SERPL: 1.1 G/DL
ALP SERPL-CCNC: 113 U/L (ref 39–117)
ALT SERPL W P-5'-P-CCNC: 29 U/L (ref 1–33)
ANION GAP SERPL CALCULATED.3IONS-SCNC: 10.5 MMOL/L (ref 5–15)
AST SERPL-CCNC: 37 U/L (ref 1–32)
BASOPHILS # BLD AUTO: 0.07 10*3/MM3 (ref 0–0.2)
BASOPHILS NFR BLD AUTO: 1.1 % (ref 0–1.5)
BILIRUB SERPL-MCNC: 0.6 MG/DL (ref 0–1.2)
BILIRUB UR QL STRIP: NEGATIVE
BUN SERPL-MCNC: 10 MG/DL (ref 8–23)
BUN/CREAT SERPL: 15.4 (ref 7–25)
CALCIUM SPEC-SCNC: 9.5 MG/DL (ref 8.6–10.5)
CHLORIDE SERPL-SCNC: 106 MMOL/L (ref 98–107)
CLARITY UR: CLEAR
CO2 SERPL-SCNC: 24.5 MMOL/L (ref 22–29)
COLOR UR: ABNORMAL
CREAT SERPL-MCNC: 0.65 MG/DL (ref 0.57–1)
DEPRECATED RDW RBC AUTO: 51.2 FL (ref 37–54)
EGFRCR SERPLBLD CKD-EPI 2021: 97.2 ML/MIN/1.73
EOSINOPHIL # BLD AUTO: 0.48 10*3/MM3 (ref 0–0.4)
EOSINOPHIL NFR BLD AUTO: 7.9 % (ref 0.3–6.2)
ERYTHROCYTE [DISTWIDTH] IN BLOOD BY AUTOMATED COUNT: 13.9 % (ref 12.3–15.4)
GLOBULIN UR ELPH-MCNC: 3.7 GM/DL
GLUCOSE SERPL-MCNC: 88 MG/DL (ref 65–99)
GLUCOSE UR STRIP-MCNC: NEGATIVE MG/DL
HCT VFR BLD AUTO: 45.4 % (ref 34–46.6)
HGB BLD-MCNC: 15.1 G/DL (ref 12–15.9)
HGB UR QL STRIP.AUTO: NEGATIVE
IMM GRANULOCYTES # BLD AUTO: 0.02 10*3/MM3 (ref 0–0.05)
IMM GRANULOCYTES NFR BLD AUTO: 0.3 % (ref 0–0.5)
KETONES UR QL STRIP: NEGATIVE
LEUKOCYTE ESTERASE UR QL STRIP.AUTO: NEGATIVE
LYMPHOCYTES # BLD AUTO: 1.4 10*3/MM3 (ref 0.7–3.1)
LYMPHOCYTES NFR BLD AUTO: 23 % (ref 19.6–45.3)
MCH RBC QN AUTO: 33.1 PG (ref 26.6–33)
MCHC RBC AUTO-ENTMCNC: 33.3 G/DL (ref 31.5–35.7)
MCV RBC AUTO: 99.6 FL (ref 79–97)
MONOCYTES # BLD AUTO: 0.62 10*3/MM3 (ref 0.1–0.9)
MONOCYTES NFR BLD AUTO: 10.2 % (ref 5–12)
NEUTROPHILS NFR BLD AUTO: 3.51 10*3/MM3 (ref 1.7–7)
NEUTROPHILS NFR BLD AUTO: 57.5 % (ref 42.7–76)
NITRITE UR QL STRIP: NEGATIVE
NRBC BLD AUTO-RTO: 0 /100 WBC (ref 0–0.2)
PH UR STRIP.AUTO: 7 [PH] (ref 5–8)
PLATELET # BLD AUTO: 179 10*3/MM3 (ref 140–450)
PMV BLD AUTO: 9.2 FL (ref 6–12)
POTASSIUM SERPL-SCNC: 3.8 MMOL/L (ref 3.5–5.2)
PROT SERPL-MCNC: 7.7 G/DL (ref 6–8.5)
PROT UR QL STRIP: NEGATIVE
RBC # BLD AUTO: 4.56 10*6/MM3 (ref 3.77–5.28)
SODIUM SERPL-SCNC: 141 MMOL/L (ref 136–145)
SP GR UR STRIP: <=1.005 (ref 1–1.03)
UROBILINOGEN UR QL STRIP: ABNORMAL
WBC NRBC COR # BLD AUTO: 6.1 10*3/MM3 (ref 3.4–10.8)

## 2024-12-26 PROCEDURE — 25010000002 ATEZOLIZUMAB 1200 MG/20ML SOLUTION 20 ML VIAL: Performed by: INTERNAL MEDICINE

## 2024-12-26 PROCEDURE — 81003 URINALYSIS AUTO W/O SCOPE: CPT

## 2024-12-26 PROCEDURE — 85025 COMPLETE CBC W/AUTO DIFF WBC: CPT

## 2024-12-26 PROCEDURE — 96417 CHEMO IV INFUS EACH ADDL SEQ: CPT

## 2024-12-26 PROCEDURE — 25810000003 SODIUM CHLORIDE 0.9 % SOLUTION 250 ML FLEX CONT: Performed by: INTERNAL MEDICINE

## 2024-12-26 PROCEDURE — 96413 CHEMO IV INFUSION 1 HR: CPT

## 2024-12-26 PROCEDURE — 80053 COMPREHEN METABOLIC PANEL: CPT

## 2024-12-26 PROCEDURE — 25010000002 BEVACIZUMAB PER 10 MG: Performed by: INTERNAL MEDICINE

## 2024-12-26 RX ORDER — SODIUM CHLORIDE 9 MG/ML
20 INJECTION, SOLUTION INTRAVENOUS ONCE
Status: DISCONTINUED | OUTPATIENT
Start: 2024-12-26 | End: 2024-12-26

## 2024-12-26 RX ADMIN — SODIUM CHLORIDE 1200 MG: 9 INJECTION, SOLUTION INTRAVENOUS at 12:48

## 2024-12-26 RX ADMIN — BEVACIZUMAB 800 MG: 400 INJECTION, SOLUTION INTRAVENOUS at 13:30

## 2024-12-26 NOTE — PROGRESS NOTES
SS contacted patient to make aware of email received from [employer]. Patient to contact [employer] to apply for funding. Please see below:       The [employer] recognizes the intense emotional and financial distress caregiving often causes for the caregiver and will provide meaningful assistance through the Caregiver Support Fund with our partners at [employer] ([employer]). [employer] will offer case management services and direct financial assistance to caregivers of loved ones who have been diagnosed with cancer and are currently undergoing treatment.  The fund will help people with $2000 each, 1 time only, to cover the out-of-pocket costs of living expenses including transportation, housing, utilities, respite care, food and nutrition needs as well. The fund will be open for applicants through the remainder of 2024 for applications.  Patients must meet certain eligibility criteria for their caregiver to be eligible for financial assistance from the fund. Eligibility for the funding includes:   Patients must have a confirmed diagnosis of cancer, any type, any stage, confirmed by the patient's treating physician and/or his designee through the submission of a signed physician's form.   Applicants must have a current annual household income at or below 500% of the Federal Poverty Guideline (FPG).  Note: Parent or legal guardian information will be screened to confirm their identity and income eligibility if the applicants are under 18 years of age.   Patients must be [place] citizens or permanent residents and reside in the [place] or [place] territories, which is verified by applicant's physical address, and attested to by the applicant.  hospitals will screen applicants and administer the fund. You can learn more, and apply, by visiting the [employer] Financial Aid Fund information page. If you know someone who could benefit from this program, applications can be made by calling [phone] or online 24/7 using the  [facility] Financial Aid Fund portal.    SS provided patient with contact information and encouraged patient to call with any questions, concerns, or needs.     Patient verbalized understanding and agreed with resource assistance and plan.    SS will continue to follow and assist the patient as needed. Ongoing support will be provided to address any emerging concerns or needs.

## 2025-01-08 ENCOUNTER — DOCUMENTATION (OUTPATIENT)
Dept: ONCOLOGY | Facility: CLINIC | Age: 65
End: 2025-01-08
Payer: MEDICARE

## 2025-01-08 NOTE — PROGRESS NOTES
SS received call from patient [phone] requesting SS to follow up on her insurance.SS contacted  to provide information.     SS will follow and assist as needed.

## 2025-01-10 ENCOUNTER — DOCUMENTATION (OUTPATIENT)
Dept: ONCOLOGY | Facility: CLINIC | Age: 65
End: 2025-01-10
Payer: MEDICARE

## 2025-01-15 ENCOUNTER — OFFICE VISIT (OUTPATIENT)
Dept: ONCOLOGY | Facility: CLINIC | Age: 65
End: 2025-01-15
Payer: MEDICARE

## 2025-01-15 ENCOUNTER — INFUSION (OUTPATIENT)
Dept: ONCOLOGY | Facility: HOSPITAL | Age: 65
End: 2025-01-15
Payer: MEDICARE

## 2025-01-15 ENCOUNTER — LAB (OUTPATIENT)
Dept: ONCOLOGY | Facility: CLINIC | Age: 65
End: 2025-01-15
Payer: MEDICARE

## 2025-01-15 VITALS
RESPIRATION RATE: 20 BRPM | OXYGEN SATURATION: 92 % | BODY MASS INDEX: 19.76 KG/M2 | TEMPERATURE: 97.5 F | DIASTOLIC BLOOD PRESSURE: 76 MMHG | SYSTOLIC BLOOD PRESSURE: 137 MMHG | HEART RATE: 93 BPM | WEIGHT: 126.2 LBS

## 2025-01-15 VITALS
SYSTOLIC BLOOD PRESSURE: 142 MMHG | RESPIRATION RATE: 16 BRPM | DIASTOLIC BLOOD PRESSURE: 84 MMHG | TEMPERATURE: 97.7 F | HEART RATE: 88 BPM | OXYGEN SATURATION: 94 %

## 2025-01-15 DIAGNOSIS — C22.0 HEPATOCELLULAR CARCINOMA: Primary | ICD-10-CM

## 2025-01-15 DIAGNOSIS — C22.0 HEPATOCELLULAR CARCINOMA: ICD-10-CM

## 2025-01-15 DIAGNOSIS — R53.82 CHRONIC FATIGUE: ICD-10-CM

## 2025-01-15 LAB
ALBUMIN SERPL-MCNC: 3.4 G/DL (ref 3.5–5.2)
ALBUMIN/GLOB SERPL: 1 G/DL
ALP SERPL-CCNC: 89 U/L (ref 39–117)
ALPHA-FETOPROTEIN: 22.2 NG/ML (ref 0–8.3)
ALT SERPL W P-5'-P-CCNC: 22 U/L (ref 1–33)
ANION GAP SERPL CALCULATED.3IONS-SCNC: 8.1 MMOL/L (ref 5–15)
AST SERPL-CCNC: 36 U/L (ref 1–32)
BASOPHILS # BLD AUTO: 0.07 10*3/MM3 (ref 0–0.2)
BASOPHILS NFR BLD AUTO: 1.3 % (ref 0–1.5)
BILIRUB SERPL-MCNC: 0.7 MG/DL (ref 0–1.2)
BILIRUB UR QL STRIP: NEGATIVE
BUN SERPL-MCNC: 8 MG/DL (ref 8–23)
BUN/CREAT SERPL: 12.5 (ref 7–25)
CALCIUM SPEC-SCNC: 9.7 MG/DL (ref 8.6–10.5)
CHLORIDE SERPL-SCNC: 108 MMOL/L (ref 98–107)
CLARITY UR: CLEAR
CO2 SERPL-SCNC: 23.9 MMOL/L (ref 22–29)
COLOR UR: ABNORMAL
CREAT SERPL-MCNC: 0.64 MG/DL (ref 0.57–1)
DEPRECATED RDW RBC AUTO: 51.5 FL (ref 37–54)
EGFRCR SERPLBLD CKD-EPI 2021: 97.6 ML/MIN/1.73
EOSINOPHIL # BLD AUTO: 0.47 10*3/MM3 (ref 0–0.4)
EOSINOPHIL NFR BLD AUTO: 8.7 % (ref 0.3–6.2)
ERYTHROCYTE [DISTWIDTH] IN BLOOD BY AUTOMATED COUNT: 14 % (ref 12.3–15.4)
GLOBULIN UR ELPH-MCNC: 3.5 GM/DL
GLUCOSE SERPL-MCNC: 90 MG/DL (ref 65–99)
GLUCOSE UR STRIP-MCNC: NEGATIVE MG/DL
HCT VFR BLD AUTO: 43 % (ref 34–46.6)
HGB BLD-MCNC: 14.5 G/DL (ref 12–15.9)
HGB UR QL STRIP.AUTO: NEGATIVE
IMM GRANULOCYTES # BLD AUTO: 0.01 10*3/MM3 (ref 0–0.05)
IMM GRANULOCYTES NFR BLD AUTO: 0.2 % (ref 0–0.5)
KETONES UR QL STRIP: NEGATIVE
LEUKOCYTE ESTERASE UR QL STRIP.AUTO: NEGATIVE
LYMPHOCYTES # BLD AUTO: 1.34 10*3/MM3 (ref 0.7–3.1)
LYMPHOCYTES NFR BLD AUTO: 24.7 % (ref 19.6–45.3)
MCH RBC QN AUTO: 33.8 PG (ref 26.6–33)
MCHC RBC AUTO-ENTMCNC: 33.7 G/DL (ref 31.5–35.7)
MCV RBC AUTO: 100.2 FL (ref 79–97)
MONOCYTES # BLD AUTO: 0.68 10*3/MM3 (ref 0.1–0.9)
MONOCYTES NFR BLD AUTO: 12.5 % (ref 5–12)
NEUTROPHILS NFR BLD AUTO: 2.85 10*3/MM3 (ref 1.7–7)
NEUTROPHILS NFR BLD AUTO: 52.6 % (ref 42.7–76)
NITRITE UR QL STRIP: NEGATIVE
NRBC BLD AUTO-RTO: 0 /100 WBC (ref 0–0.2)
PH UR STRIP.AUTO: 6.5 [PH] (ref 5–8)
PLATELET # BLD AUTO: 128 10*3/MM3 (ref 140–450)
PMV BLD AUTO: 9.7 FL (ref 6–12)
POTASSIUM SERPL-SCNC: 3.3 MMOL/L (ref 3.5–5.2)
PROT SERPL-MCNC: 6.9 G/DL (ref 6–8.5)
PROT UR QL STRIP: NEGATIVE
RBC # BLD AUTO: 4.29 10*6/MM3 (ref 3.77–5.28)
SODIUM SERPL-SCNC: 140 MMOL/L (ref 136–145)
SP GR UR STRIP: 1.01 (ref 1–1.03)
T4 FREE SERPL-MCNC: 0.83 NG/DL (ref 0.92–1.68)
TSH SERPL DL<=0.05 MIU/L-ACNC: 4.96 UIU/ML (ref 0.27–4.2)
UROBILINOGEN UR QL STRIP: ABNORMAL
WBC NRBC COR # BLD AUTO: 5.42 10*3/MM3 (ref 3.4–10.8)

## 2025-01-15 PROCEDURE — 99214 OFFICE O/P EST MOD 30 MIN: CPT | Performed by: INTERNAL MEDICINE

## 2025-01-15 PROCEDURE — 82105 ALPHA-FETOPROTEIN SERUM: CPT | Performed by: INTERNAL MEDICINE

## 2025-01-15 PROCEDURE — 25010000002 ATEZOLIZUMAB 1200 MG/20ML SOLUTION 20 ML VIAL: Performed by: INTERNAL MEDICINE

## 2025-01-15 PROCEDURE — A9270 NON-COVERED ITEM OR SERVICE: HCPCS

## 2025-01-15 PROCEDURE — 84443 ASSAY THYROID STIM HORMONE: CPT | Performed by: INTERNAL MEDICINE

## 2025-01-15 PROCEDURE — 25010000002 BEVACIZUMAB PER 10 MG: Performed by: INTERNAL MEDICINE

## 2025-01-15 PROCEDURE — 25810000003 SODIUM CHLORIDE 0.9 % SOLUTION 250 ML FLEX CONT: Performed by: INTERNAL MEDICINE

## 2025-01-15 PROCEDURE — 63710000001 POTASSIUM CHLORIDE 20 MEQ TABLET CONTROLLED-RELEASE

## 2025-01-15 PROCEDURE — 80053 COMPREHEN METABOLIC PANEL: CPT | Performed by: INTERNAL MEDICINE

## 2025-01-15 PROCEDURE — 84439 ASSAY OF FREE THYROXINE: CPT | Performed by: INTERNAL MEDICINE

## 2025-01-15 PROCEDURE — 96413 CHEMO IV INFUSION 1 HR: CPT

## 2025-01-15 PROCEDURE — 1126F AMNT PAIN NOTED NONE PRSNT: CPT | Performed by: INTERNAL MEDICINE

## 2025-01-15 PROCEDURE — 96417 CHEMO IV INFUS EACH ADDL SEQ: CPT

## 2025-01-15 PROCEDURE — 81003 URINALYSIS AUTO W/O SCOPE: CPT | Performed by: INTERNAL MEDICINE

## 2025-01-15 PROCEDURE — 85025 COMPLETE CBC W/AUTO DIFF WBC: CPT | Performed by: INTERNAL MEDICINE

## 2025-01-15 RX ORDER — ALUMINUM HYDROXIDE, MAGNESIUM HYDROXIDE, DIMETHICONE 400; 400; 40 MG/10ML; MG/10ML; MG/10ML
5 SUSPENSION ORAL EVERY 6 HOURS PRN
COMMUNITY
Start: 2024-11-15

## 2025-01-15 RX ORDER — MAG HYDROX/ALUMINUM HYD/SIMETH 400-400-40
1 SUSPENSION, ORAL (FINAL DOSE FORM) ORAL AS NEEDED
COMMUNITY
Start: 2024-12-16

## 2025-01-15 RX ORDER — POTASSIUM CHLORIDE 1500 MG/1
40 TABLET, EXTENDED RELEASE ORAL ONCE
Qty: 2 TABLET | Refills: 0 | Status: SHIPPED | OUTPATIENT
Start: 2025-01-15 | End: 2025-01-15

## 2025-01-15 RX ORDER — POTASSIUM CHLORIDE 1500 MG/1
40 TABLET, EXTENDED RELEASE ORAL ONCE
Status: COMPLETED | OUTPATIENT
Start: 2025-01-15 | End: 2025-01-15

## 2025-01-15 RX ORDER — SODIUM CHLORIDE 9 MG/ML
20 INJECTION, SOLUTION INTRAVENOUS ONCE
Status: DISCONTINUED | OUTPATIENT
Start: 2025-01-15 | End: 2025-01-15 | Stop reason: RX

## 2025-01-15 RX ORDER — LEVOTHYROXINE SODIUM 25 UG/1
25 TABLET ORAL
Qty: 30 TABLET | Refills: 5 | Status: SHIPPED | OUTPATIENT
Start: 2025-01-15

## 2025-01-15 RX ADMIN — POTASSIUM CHLORIDE 40 MEQ: 1500 TABLET, EXTENDED RELEASE ORAL at 13:55

## 2025-01-15 RX ADMIN — BEVACIZUMAB 800 MG: 400 INJECTION, SOLUTION INTRAVENOUS at 12:22

## 2025-01-15 RX ADMIN — ATEZOLIZUMAB 1200 MG: 1200 INJECTION, SOLUTION INTRAVENOUS at 11:41

## 2025-01-15 NOTE — PROGRESS NOTES
Venipuncture Blood Specimen Collection  Venipuncture performed in right arm by Macarena Brar MA with good hemostasis. Patient tolerated the procedure well without complications.   01/15/25   Macarena Brar MA

## 2025-01-15 NOTE — PROGRESS NOTES
Name:  Noemy Brooks  :  1960  Date:  1/15/2025     REFERRING PHYSICIAN  Henri Melvin MD    PRIMARY CARE PHYSICIAN  Lynn, Haley Roca DO    REASON FOR FOLLOWUP  1. Hepatocellular carcinoma      CHIEF COMPLAINT  None.    Dear Dr. Bailey,    HISTORY OF PRESENT ILLNESS:   I saw Ms. Brooks in followup today in our medical oncology clinic. As you are aware, she is a pleasant, 64 y.o., white female with a history of hypertension, hepatitis C (treated with antivirals and reportedly cured in ~), cirrhosis and hepatocellular carcinoma who was initially diagnosed with the latter in ~2022. [place], [place]. Her [facility] gastroenterologist referred her to  at that time once she was found to have a couple of liver masses and a serum AFP > 1,000 ng/mL. She was evaluated by the liver transplant service; however, due to the HCC's involvement of the hepatic vessels, she was felt to not be a candidate (for transplant). She was subsequently evaluated by  medical oncology, who recommended starting first-line, palliative treatment with a combination of y9qbjcls bevacizumab (Mvasi) and atezolizumab (Tecentriq), per the current standard of care. She received a total of eight (8) cycles between 2022 and early 2023 through the [facility]. Due to transportation issues (her sister has to be the one to drive her, and the trips to [place] have been getting increasingly difficult for her), she presented to our clinic in 2023 in order to transfer her ongoing, oncologic care to us, closer to her home in [place], [place]. She tolerated r2gxulvg atezolizumab overall well, and she received a total of eighteen (18) cycles between  and early 2023. At that time and per her preference, we have discontinued this therapy. She previously stated that the Lord cured her once before (of Hepatitis C), and she became convinced that he has/will do it again (this time for her HCC). She did overall  well for the next ~year, and her chronic fatigue did improve; but, unfortunately, by early 2024, repeat imaging was consistent with reprogressive disease. She was agreeable to restarting Imfinzi and Avastin at that time, and she did so on 2025.    INTERIM HISTORY:  Ms. Brooks returns to clinic today for follow up by herself. Due to her history of cirrhosis, she has been deemed to be too a high of a risk to undergo the cholecystectomy she had previously been pursuing. She has now completed two (2), w7uibehh cycles since restarting atezolizumab/bevacizumab in early 2024. She reports today that she seems to be tolerating this therapy fairly well; however, her chronic fatigue has, as expected, increased. She has no new or other specific complaints in clinic today.    Past Medical History:   Diagnosis Date    Acid reflux     Aortic aneurysm     Cancer     LIVER, UTERINE    Cirrhosis     COPD (chronic obstructive pulmonary disease)     Depression     Disease of thyroid gland     Elevated cholesterol     High cholesterol     History of transfusion     Hypertension     Infectious viral hepatitis         Osteoporosis     Ovarian cancer        Past Surgical History:   Procedure Laterality Date    APPENDECTOMY N/A     COLONOSCOPY      ENDOSCOPY N/A 2023    Procedure: ESOPHAGOGASTRODUODENOSCOPY WITH BIOPSY;  Surgeon: Rahel Sesay MD;  Location: [facility];  Service: Gastroenterology;  Laterality: N/A;    FRACTURE SURGERY Left     ARM, HAS 2 SCREWS    HYSTERECTOMY      UPPER GASTROINTESTINAL ENDOSCOPY         Social History     Socioeconomic History    Marital status:     Number of children: 3   Tobacco Use    Smoking status: Former     Current packs/day: 0.00     Average packs/day: 1.5 packs/day for 25.0 years (37.5 ttl pk-yrs)     Types: Cigarettes     Start date: 1990     Quit date: 2015     Years since quittin.7    Smokeless tobacco: Never    Tobacco comments:      smoked for approx 20 years, 1.5 to 2 ppd   Vaping Use    Vaping status: Never Used   Substance and Sexual Activity    Alcohol use: No     Comment: she previously drank daily for 16 years, beer    Drug use: No    Sexual activity: Defer       Family History   Problem Relation Age of Onset    Stroke Mother     Hypertension Mother     COPD Mother     Throat cancer Mother     Arthritis Mother     Asthma Mother     Heart attack Mother     Stroke Father     Cancer Father     Liver cancer Father        Allergies   Allergen Reactions    Codeine GI Intolerance       Current Outpatient Medications   Medication Sig Dispense Refill    albuterol sulfate  (90 Base) MCG/ACT inhaler Inhale 2 puffs Every 4 (Four) Hours As Needed for Wheezing.      Antacid/Antigas 400-400-40 MG/10ML suspension Take 5 mL by mouth Every 6 (Six) Hours As Needed.      Atezolizumab (TECENTRIQ IV) Infuse  into a venous catheter Every 21 (Twenty-One) Days.      B Complex Vitamins (VITAMIN B COMPLEX PO) Take  by mouth.      budesonide-formoterol (SYMBICORT) 160-4.5 MCG/ACT inhaler Inhale 2 puffs 2 (Two) Times a Day.      cholecalciferol (VITAMIN D3) 25 MCG (1000 UT) tablet Take 1 tablet by mouth Daily.      glycerin adult 2 g suppository Insert 1 suppository into the rectum As Needed.      Magnesium Hydroxide (MILK OF MAGNESIA PO) Take  by mouth.      multivitamin (MULTI VITAMIN DAILY PO) Take 1 tablet by mouth Daily.      ondansetron ODT (ZOFRAN-ODT) 4 MG disintegrating tablet Place 1 tablet on the tongue Every 6 (Six) Hours As Needed for Nausea or Vomiting. 12 tablet 0    oxyCODONE (Roxicodone) 5 MG immediate release tablet Take 1 tablet by mouth Every 6 (Six) Hours As Needed for Severe Pain. 10 tablet 0    pantoprazole (PROTONIX) 40 MG EC tablet Take 1 tablet by mouth Daily. 30 tablet 0    traZODone (DESYREL) 100 MG tablet Take 1 tablet by mouth Every Night.      Trelegy Ellipta 200-62.5-25 MCG/ACT inhaler        No current facility-administered  medications for this visit.     REVIEW OF SYSTEMS  CONSTITUTIONAL:  No fever, chills or night sweats. Chronic fatigue, recently reworsened since restarting palliative immunotherapy/targeted therapy in 2024.  EYES:  No blurry vision, diplopia or other vision changes.  ENT:  No hearing loss, nosebleeds or sore throat.  CARDIOVASCULAR:  No palpitations, arrhythmia, syncopal episodes or edema.  PULMONARY:  No hemoptysis, wheezing, chronic cough or shortness of breath.  GASTROINTESTINAL:  As per the HPI above.  GENITOURINARY:  No hematuria, kidney stones or frequent urination.  MUSCULOSKELETAL:  No joint or back pains.  INTEGUMENTARY: No rashes or pruritus.  ENDOCRINE:  No excessive thirst or hot flashes.  HEMATOLOGIC:  No history of free bleeding, spontaneous bleeding or clotting.  IMMUNOLOGIC:  No allergies or frequent infections.  NEUROLOGIC: No numbness, tingling, seizures or weakness.  PSYCHIATRIC:  No anxiety or depression.    PHYSICAL EXAMINATION  /76   Pulse 93   Temp 97.5 °F (36.4 °C) (Temporal)   Resp 20   Wt 57.2 kg (126 lb 3.2 oz)   SpO2 92%   BMI 19.76 kg/m²     Pain Score:  Pain Score    01/15/25 1020   PainSc: 0-No pain     PHQ-Score Total:  PHQ-9 Total Score:      ECO  GENERAL:  A well-developed, well-nourished, thin, white female in no acute distress.  HEENT:  Pupils equally round and reactive to light. Extraocular muscles intact.  CARDIOVASCULAR:  Regular rate and rhythm. No murmurs, gallops or rubs.  LUNGS:  Clear to auscultation bilaterally.  ABDOMEN:  Soft, nontender, nondistended with positive bowel sounds.  EXTREMITIES:  No clubbing, cyanosis or edema bilaterally.  SKIN:  No rashes or petechiae.  NEURO:  Cranial nerves grossly intact. No focal deficits.  PSYCH:  Alert and oriented x3.    The physical exam is once again unchanged from recent priors.    LABORATORY  Lab Results   Component Value Date    WBC 5.42 01/15/2025    HGB 14.5 01/15/2025    HCT 43.0 01/15/2025    MCV  100.2 (H) 01/15/2025     (L) 01/15/2025    NEUTROABS 2.85 01/15/2025       Lab Results   Component Value Date     01/15/2025    K 3.3 (L) 01/15/2025     (H) 01/15/2025    CO2 23.9 01/15/2025    BUN 8 01/15/2025    CREATININE 0.64 01/15/2025    GLUCOSE 90 01/15/2025    CALCIUM 9.7 01/15/2025    AST 36 (H) 01/15/2025    ALT 22 01/15/2025    ALKPHOS 89 01/15/2025    BILITOT 0.7 01/15/2025    PROTEINTOT 6.9 01/15/2025    ALBUMIN 3.4 (L) 01/15/2025     CBC (01/15/2025): WBCs: 5.42; HgB: 14.5; Hct: 43.0; platelets: 128  CBC (11/14/2024): WBCs: 5.97; HgB: 13.1; Hct: 39,8; platelets: 124  CBC (08/14/2024): WBCs: 6.68; HgB: 13.9; Hct: 42.2; platelets: 170  CBC (05/21/2024): WBCs: 4.92; HgB: 13.9; Hct: 40.9; platelets: 126  CBC (02/21/2024): WBCs: 6.29; HgB: 14.3; Hct: 42.9; platelets: 150  CBC (12/19/2023): WBCs: 5.64; HgB: 14.5; Hct: 44.2; platelets: 120  CBC (10/19/2023): WBCs: 6.05; HgB: 14.4; Hct: 43.7; platelets: 119  CBC (08/16/2023): WBCs: 5.87; HgB: 14.3; Hct: 44.2; platelets: 120  CBC (05/24/2023): WBCs: 7.45; HgB: 15.8; Hct: 47.0; platelets: 125  CBC (05/01/2023): WBCs: 6.91; HgB: 15.9; Hct: 47.7; platelets: 118  CBC (04/03/2023): WBCs: 6.68; HgB: 15.9; Hct: 45.7; platelets: 116; MCV: 94    AFP (01/15/2025): pending  AFP (11/14/2024): 36.6 ng/mL  AFP (09/25/2024): 19.4 ng/mL  AFP (08/14/2024): 15.10 ng/mL  AFP (05/21/2024): 9.44 ng/mL  AFP (02/21/2024): 7.07 ng/mL  AFP (12/19/2023): 5.88 ng/mL  AFP (11/09/2023): 6.00 ng.mL  AFP (09/28/2023): 5.55 ng/mL  AFP (08/16/2023): 3.96 ng/mL  AFP (07/06/2023): 3.33 ng/mL  AFP (05/24/2023): 2.66 ng/mL  AFP (05/01/2023): < 2 ng/mL  AFP (01/27/2023): 2.6 ng/mL  AFP (10/31/2022): 1031.0 ng/mL    IMAGING  CT liver (10/21/2022, at ):  Impression: LR 5 segment 7 lesion measuring up to 2.8 x 2.5 cm with associated TIV (tumor in vein). LR 3 legion measuring 8 mm in segment 4A.    CT chest, abdomen and pelvis with contrast (01/27/2023, at ):  Impression:  Chest:  No convincing metastatic disease in the thorax.  Abdomen/Pelvis: Within the limits of the study, the area of washout representing the known HCC is stable to smaller in size. The component representing the tumor in vein is smaller in size. No new liver lesions. No distant metastatic disease.    MRI abdomen with and without contrast (04/13/2023):  Impression:  1) No solid arterial phase enhancing liver lesions identified. No delayed phase enhancing liver lesions are noted.  2) Liver cirrhosis and portal hypertension.  3) 0.6 cm simple appearing benign hepatic cyst right lobe.  4) Simple appearing cyst right kidney. No hydronephrosis.  5) Other nonacute findings.    MRI abdomen with and without contrast (11/03/2023, compared to 04/13/2023):  Impression: Little overall change. No contrast-enhancing hepatic lesions are identified.    MRI abdomen with and without contrast (05/14/2024, compared to MRI on 11/03/2023):  Impression:  1) Development of two arterial phase enhancing lesions immediately adjacent to each other localizing to segment 7/8 of the right hepatic lobe worrisome for primary HCC given extensive background changes of liver cirrhosis. Metastatic disease is also within the differential.  2) Stable simple appearing cyst right hepatic lobe.  3) Cholelithiasis.  4) Portal hypertension changes which include presence of splenorenal shunts.  5) Other incidental/nonacute findings.    CT abdomen and pelvis without contrast (06/17/2024, compared to 03/17/2024):  Impression:  1) Advanced liver cirrhosis.  2) Mild splenomegaly.  3) Prominent portosystemic collateral vessels compatible with portal hypertension. No ascites.  4) Cholelithiasis.  5) Mild constipation. No bowel obstruction.  6) Prior appendectomy and hysterectomy. Other incidental/nonacute findings.    MRI abdomen with and without contrast (08/05/2024, compared to 05/14/2024):  Impression:  1) As noted previously, two, arterial phase enhancing lesions dome  of liver segment 7/8 not significantly changed in configuration from the previous exam.  2) Larger lesion is 13.1 mm and was previously 11.7 mm but could be due to differences in technique.  3) The smaller lesion is 7.2 mm and was previously 6.9 mm.  4) Segmental type enhancement is noted in the caudate lobe which may be in part related that is approximately 2.63 cm. Diffusion restriction is noted raising suspicious for separate neoplastic lesion. Area was previously obscured by motion artifact and proximity of the duodenum.  5) Small faby hepatis region lymph nodes are noted one of which is 1.45 cm and a smaller node that is 0.81 cm and were previously too small to characterize.  6) Advanced liver cirrhosis, stable. Changes of portal hypertension are again noted and stable.  7) Cholelithiasis, stable.  8) Simple appearing cyst right lobe liver, stable.    Ultrasound gallbladder (08/31/2024):  Impression:  1) Few small gallstones. Despite a reportedly positive sonographic Zamora's sign, no other sonographic features to suggest cholecystitis    CT abdomen and pelvis with contrast (09/06/2024):  Impression: Focal hepatic observations. Li-Rads-TIV (definitely tumor in vein) and Li-Rads 5 (definite HCC).    MRI abdomen with and without contrast (11/08/2024, compared to 08/05/2024):  Impression:  1) Two lesions again noted in segement 7/8 of the liver that show arterial phase enhancement. Larger lesion is 15.2 mm and was previously 13.1 mm. Smaller lesion is 9.9 mm and was previously 7.2 mm. Overall increased in size.  2) Size increase of caudate lobe arterial phase enhancing lesion now 34.6 mm and was previously 26.3 mm.  3) Increased size of faby hepatis enhancing enlarged lymph nodes. Larger lymph node is 20.2 mm and was previously 14.5 mm. The smaller adjacent lymph node is 11.2 mm and was previously 8.1 mm.  4) Tiny segment 3 liver lesion is 5.4 mm with arterial phase enhancement and was not previously well  characterized due to motion artifact.  5) Increased signal changes noted of the portal vein and more specifically the left portal venous branches suspicious for portal vein thrombosis. Correlate with ultrasound Doppler assessment.  6) Advanced liver cirrhosis with portal hypertension changes, stable.  7) Other nonacute findings stable from previous.    PATHOLOGY    IMPRESSION AND PLAN  Ms. Brooks is a 64 y.o., white female with:  Hepatocellular carcinoma: Initially diagnosed in Fall 2022 after a CT of the liver (performed on 10/21/2022 at , summarized above) confirmed the presence of two lesions (one measuring 2.8 x 2.5 cm in segment 7 and a probable satellite measuring ~8 mm in segment 4A) in the setting of known, baseline cirrhosis and a serum AFP level of > 1000 ng/mL. Due to venous involvement by the tumor, she was/is not a candidate for a liver transplant.  medical oncology therefore recommended initiating first-line, palliative, systemic treatment with a combination of s1gcnaiw bevacizumab (the Avastin biosimilar Mvasi) and q6htmmwy atezolizumab (Tecentriq). She received a total of eight (8), o5ametqh cycles through the [facility] between Fall 2022 and early April 2023 (the eighth and final cycle of both were given on 04/03/2023). With this therapy, MRIs of the abdomen (liver protocol) performed on 04/13/2023 and 11/03/2023 (both summarized above) showed no visible signs of a liver mass at all. Also with the therapy she has received to date, her serum AFP level declined from >1000 ng/mL in late October 2023 to solidly WNL by January 2023), consistent with a complete response in her disease. I therefore had a long discussion with the patient and her sister in Spring 2023 regarding our treatment recommendations from that point. In short, the potential risks of any additional cycles of Avastin (particularly bleeding; she has an aortic aneurysm in addition to her baseline cirrhosis) likely  "outweighed any potential, minimal future benefit at that time); however, continuing indefinite, v4ckumro atezolizumab continued to be recommended (as the current standard of care; it appears to have worked extremely well). She completed a total of eighteen (18) cycles between then and November 2023. At that time, it was her preference that we place this therapy on indefinite hold, as she was convinced the Lord had healed her again (this time of her HCC); and she believed that the indefinite atezolizumab was causing her chronic, quality-of-life limiting fatigue. While remaining fully aware of the potential risks of discontinuing this medication, she did overall clinically well over the course of the following year; however, during that time, intermittent repeat MRIs of the abdomen (performed on 05/14/2024, 08/05/2024 and 11/08/2024, all summarized above) did show, slowly, but steadily, relapsing disease, with two arterial phase enhancing lesions immediately adjacent to each other localizing to segment 7/8 now visible, and steadily reenlarging, again. By late 2024, she was (finally) agreeable to restarting palliative therapy, and she did so on 12/05/2024. She has now completed a total of two (2) cycles of q9jggijv atezolizumab/bevacizumab since restarting it, and she is overall tolerating it fairly well again (with, not surprisingly, reincreased fatigue as her only noticeable side effect). We will proceed with this current treatment plan (with the third cycle of atezolizumab/bevacizumab today). We will see her back in our clinic in six weeks, on the day of the fifth cycle (in ~late February), with a CBC, CMP, TSH, AFP and repeat MRI of the abdomen with and without contrast.  Cirrhosis: Likely secondary to a longstanding history of both issue #3 (which was diagnosed and reportedly cured in ~2018, probably decades after she initially contracted it through her ) and alcohol abuse (she drank \"a lot\" of beer every " day for ~twenty years but quit in the ). Currently still compensated. Ongoing management per gastroenterology/hepatology.  Hepatitis C: Reportedly contracted through her  (who ultimately  from it) without her knowledge, but also reportedly diagnosed and cured with a months-long course of antivirals in ~2018. Ongoing management per gastroenterology/hepatology.  Protein calorie malnutrition: Recently still much improved. Continue Marinol 2.5 mg PO BID. Continue to monitor.  Gallbladder issues: Due to issue #2, a couple of different surgeons have now recommended againist pursuing a cholecystectomy at this time. Continue to monitor.  Aortic aneurysm: Ongoing monitoring per CT/vascular surgery.  Fatigue: Multifactorial, with recently restarted palliative immunotherapy likely contributing. Refills of Synthroid 25 mcg PO daily were provided today (which she has apparently not been taking lately). Continue to monitor.  The patient was in agreement with these plans.    It is a pleasure to participate in Ms. Brooks's care. Please do not hesitate to call with any questions or concerns that you may have.    A total of 30 minutes were spent coordinating this patient’s care in clinic today; more than 50% of this time was face-to-face with the patient, reviewing her interim medical history, discussing the results of recent labwork and counseling on the current evaluation, treatment and followup plan. All questions were answered to her satisfaction.    FOLLOW UP  Proceed with k1asyges atezolizumab/bevacizumab, as planned (with the 3rd cycle, since she restarted this therapy, today). Return to our clinic in 6 weeks, on the day of the 5th cycle of atezolizumab/bevacizumab (in ~late February), with a CBC, CMP, TSH, AFP and repeat MRI of the abdomen with and without contrast.          This document was electronically signed by NADEGE Boss MD January 15, 2025 11:08 EST      CC: DO Henri Davis  MD Henri Vallejo MD Erika G. Almodovar, MD John H. Chaney, MD

## 2025-01-16 ENCOUNTER — TELEPHONE (OUTPATIENT)
Dept: ONCOLOGY | Facility: CLINIC | Age: 65
End: 2025-01-16
Payer: MEDICARE

## 2025-01-16 NOTE — TELEPHONE ENCOUNTER
RN returned patient's call and let her know that she does need to take the potassium prescription as written (2 tablets today). She verbalized understanding. RN also discussed potassium rich foods with the patient as she asked what she could eat to help increase her potassium naturally.

## 2025-01-16 NOTE — TELEPHONE ENCOUNTER
Caller: Noemy Brooks    Relationship: Self    Best call back number: [phone]    Who are you requesting to speak with (clinical staff, provider,  specific staff member): CLINICAL    What was the call regarding: PT ASKING FOR CALLBACK REGARDING DOSAGE FOR POTASSIUM CHLORIDE.  SHE HAD 2 PILLS YESTERDAY AND WANTS TO KNOW IF SHE IS SUPPOSED TO TAKE 2 TODAY ALSO    PLEASE ADVISE

## 2025-01-24 ENCOUNTER — TELEPHONE (OUTPATIENT)
Dept: ONCOLOGY | Facility: CLINIC | Age: 65
End: 2025-01-24
Payer: MEDICARE

## 2025-01-24 NOTE — TELEPHONE ENCOUNTER
Patient called, she says that a Dr. Corona has scheduled a test for her on the 29th. She doesn't know the name of the test but says it has to do with an aneurysm. She wants to make sure Dr. Boss is aware of this and wants his approval to have this testing done. She said if he needs to know what it is their office number is [phone].   Patient also states that her potassium was low recently and she wanted to see if we could call her in a prescription for the potassium here to the hospital pharmacy and she could pick it up while she is here for her infusion on 02-05.    Thanks

## 2025-01-24 NOTE — TELEPHONE ENCOUNTER
RN spoke with patient and informed her that Dr. Boss is fine with whatever test that she has to have completed with cardiology. RN also informed patient that we will recheck potassium levels at her visit on 2/5 and send in replacement if needed. Patient verbalized understanding. No other questions or concerns at this time.

## 2025-02-05 ENCOUNTER — LAB (OUTPATIENT)
Dept: ONCOLOGY | Facility: HOSPITAL | Age: 65
End: 2025-02-05
Payer: MEDICARE

## 2025-02-05 ENCOUNTER — INFUSION (OUTPATIENT)
Dept: ONCOLOGY | Facility: HOSPITAL | Age: 65
End: 2025-02-05
Payer: MEDICARE

## 2025-02-05 VITALS
SYSTOLIC BLOOD PRESSURE: 145 MMHG | BODY MASS INDEX: 19.76 KG/M2 | WEIGHT: 126.2 LBS | HEART RATE: 78 BPM | DIASTOLIC BLOOD PRESSURE: 76 MMHG | OXYGEN SATURATION: 92 % | RESPIRATION RATE: 16 BRPM | TEMPERATURE: 97.7 F

## 2025-02-05 DIAGNOSIS — C22.0 HEPATOCELLULAR CARCINOMA: ICD-10-CM

## 2025-02-05 DIAGNOSIS — C22.0 HEPATOCELLULAR CARCINOMA: Primary | ICD-10-CM

## 2025-02-05 LAB
ALBUMIN SERPL-MCNC: 3.5 G/DL (ref 3.5–5.2)
ALBUMIN/GLOB SERPL: 1 G/DL
ALP SERPL-CCNC: 74 U/L (ref 39–117)
ALT SERPL W P-5'-P-CCNC: 25 U/L (ref 1–33)
ANION GAP SERPL CALCULATED.3IONS-SCNC: 10 MMOL/L (ref 5–15)
AST SERPL-CCNC: 34 U/L (ref 1–32)
BASOPHILS # BLD AUTO: 0.07 10*3/MM3 (ref 0–0.2)
BASOPHILS NFR BLD AUTO: 1.3 % (ref 0–1.5)
BILIRUB SERPL-MCNC: 0.9 MG/DL (ref 0–1.2)
BILIRUB UR QL STRIP: NEGATIVE
BUN SERPL-MCNC: 7 MG/DL (ref 8–23)
BUN/CREAT SERPL: 11.9 (ref 7–25)
CALCIUM SPEC-SCNC: 9.6 MG/DL (ref 8.6–10.5)
CHLORIDE SERPL-SCNC: 109 MMOL/L (ref 98–107)
CLARITY UR: CLEAR
CO2 SERPL-SCNC: 25 MMOL/L (ref 22–29)
COLOR UR: ABNORMAL
CREAT SERPL-MCNC: 0.59 MG/DL (ref 0.57–1)
DEPRECATED RDW RBC AUTO: 53 FL (ref 37–54)
EGFRCR SERPLBLD CKD-EPI 2021: 99.5 ML/MIN/1.73
EOSINOPHIL # BLD AUTO: 0.36 10*3/MM3 (ref 0–0.4)
EOSINOPHIL NFR BLD AUTO: 6.7 % (ref 0.3–6.2)
ERYTHROCYTE [DISTWIDTH] IN BLOOD BY AUTOMATED COUNT: 14.4 % (ref 12.3–15.4)
GLOBULIN UR ELPH-MCNC: 3.6 GM/DL
GLUCOSE SERPL-MCNC: 87 MG/DL (ref 65–99)
GLUCOSE UR STRIP-MCNC: NEGATIVE MG/DL
HCT VFR BLD AUTO: 43.1 % (ref 34–46.6)
HGB BLD-MCNC: 14.6 G/DL (ref 12–15.9)
HGB UR QL STRIP.AUTO: NEGATIVE
IMM GRANULOCYTES # BLD AUTO: 0.01 10*3/MM3 (ref 0–0.05)
IMM GRANULOCYTES NFR BLD AUTO: 0.2 % (ref 0–0.5)
KETONES UR QL STRIP: NEGATIVE
LEUKOCYTE ESTERASE UR QL STRIP.AUTO: NEGATIVE
LYMPHOCYTES # BLD AUTO: 1.09 10*3/MM3 (ref 0.7–3.1)
LYMPHOCYTES NFR BLD AUTO: 20.1 % (ref 19.6–45.3)
MCH RBC QN AUTO: 34.1 PG (ref 26.6–33)
MCHC RBC AUTO-ENTMCNC: 33.9 G/DL (ref 31.5–35.7)
MCV RBC AUTO: 100.7 FL (ref 79–97)
MONOCYTES # BLD AUTO: 0.63 10*3/MM3 (ref 0.1–0.9)
MONOCYTES NFR BLD AUTO: 11.6 % (ref 5–12)
NEUTROPHILS NFR BLD AUTO: 3.25 10*3/MM3 (ref 1.7–7)
NEUTROPHILS NFR BLD AUTO: 60.1 % (ref 42.7–76)
NITRITE UR QL STRIP: NEGATIVE
NRBC BLD AUTO-RTO: 0 /100 WBC (ref 0–0.2)
PH UR STRIP.AUTO: 7.5 [PH] (ref 5–8)
PLATELET # BLD AUTO: 97 10*3/MM3 (ref 140–450)
PMV BLD AUTO: 9.7 FL (ref 6–12)
POTASSIUM SERPL-SCNC: 3.6 MMOL/L (ref 3.5–5.2)
PROT SERPL-MCNC: 7.1 G/DL (ref 6–8.5)
PROT UR QL STRIP: NEGATIVE
RBC # BLD AUTO: 4.28 10*6/MM3 (ref 3.77–5.28)
SODIUM SERPL-SCNC: 144 MMOL/L (ref 136–145)
SP GR UR STRIP: <=1.005 (ref 1–1.03)
UROBILINOGEN UR QL STRIP: ABNORMAL
WBC NRBC COR # BLD AUTO: 5.41 10*3/MM3 (ref 3.4–10.8)

## 2025-02-05 PROCEDURE — 96413 CHEMO IV INFUSION 1 HR: CPT

## 2025-02-05 PROCEDURE — 80053 COMPREHEN METABOLIC PANEL: CPT

## 2025-02-05 PROCEDURE — 25010000002 BEVACIZUMAB PER 10 MG: Performed by: INTERNAL MEDICINE

## 2025-02-05 PROCEDURE — 96417 CHEMO IV INFUS EACH ADDL SEQ: CPT

## 2025-02-05 PROCEDURE — 81003 URINALYSIS AUTO W/O SCOPE: CPT

## 2025-02-05 PROCEDURE — 25010000002 ATEZOLIZUMAB 1200 MG/20ML SOLUTION 20 ML VIAL: Performed by: INTERNAL MEDICINE

## 2025-02-05 PROCEDURE — 85025 COMPLETE CBC W/AUTO DIFF WBC: CPT

## 2025-02-05 PROCEDURE — 25810000003 SODIUM CHLORIDE 0.9 % SOLUTION 250 ML FLEX CONT: Performed by: INTERNAL MEDICINE

## 2025-02-05 RX ADMIN — BEVACIZUMAB 800 MG: 400 INJECTION, SOLUTION INTRAVENOUS at 13:27

## 2025-02-05 RX ADMIN — SODIUM CHLORIDE 1200 MG: 9 INJECTION, SOLUTION INTRAVENOUS at 12:47

## 2025-02-20 ENCOUNTER — TELEPHONE (OUTPATIENT)
Dept: ONCOLOGY | Facility: CLINIC | Age: 65
End: 2025-02-20

## 2025-02-20 NOTE — TELEPHONE ENCOUNTER
Caller: Noemy Brooks    Relationship to patient: Self    Best call back number: [phone]    Chief complaint: SCHEDULING    Type of visit: LAB, FOLLOW UP, INFUSION    Requested date: NEXT AVLIABLE AFTER 3-5     If rescheduling, when is the original appointment: 2-26     Additional notes: MRI WAS RESCHEDULED TO 3-5     PT NEEDS A 3 DAY NOTICE TO ARRANGE A RIDE

## 2025-03-05 ENCOUNTER — HOSPITAL ENCOUNTER (OUTPATIENT)
Dept: MRI IMAGING | Facility: HOSPITAL | Age: 65
Discharge: HOME | End: 2025-03-05
Admitting: INTERNAL MEDICINE
Payer: MEDICARE

## 2025-03-05 DIAGNOSIS — C22.0 HEPATOCELLULAR CARCINOMA: ICD-10-CM

## 2025-03-05 PROCEDURE — A9577 INJ MULTIHANCE: HCPCS | Performed by: INTERNAL MEDICINE

## 2025-03-05 PROCEDURE — 74183 MRI ABD W/O CNTR FLWD CNTR: CPT

## 2025-03-05 PROCEDURE — 25510000002 GADOBENATE DIMEGLUMINE 529 MG/ML SOLUTION: Performed by: INTERNAL MEDICINE

## 2025-03-05 RX ADMIN — GADOBENATE DIMEGLUMINE 11 ML: 529 INJECTION, SOLUTION INTRAVENOUS at 10:20

## 2025-03-07 ENCOUNTER — LAB (OUTPATIENT)
Dept: ONCOLOGY | Facility: CLINIC | Age: 65
End: 2025-03-07
Payer: MEDICARE

## 2025-03-07 ENCOUNTER — DOCUMENTATION (OUTPATIENT)
Dept: ONCOLOGY | Facility: CLINIC | Age: 65
End: 2025-03-07
Payer: MEDICARE

## 2025-03-07 ENCOUNTER — OFFICE VISIT (OUTPATIENT)
Dept: ONCOLOGY | Facility: CLINIC | Age: 65
End: 2025-03-07
Payer: MEDICARE

## 2025-03-07 ENCOUNTER — INFUSION (OUTPATIENT)
Dept: ONCOLOGY | Facility: HOSPITAL | Age: 65
End: 2025-03-07
Payer: MEDICARE

## 2025-03-07 VITALS
HEART RATE: 82 BPM | SYSTOLIC BLOOD PRESSURE: 153 MMHG | TEMPERATURE: 98.1 F | RESPIRATION RATE: 20 BRPM | OXYGEN SATURATION: 93 % | DIASTOLIC BLOOD PRESSURE: 92 MMHG

## 2025-03-07 VITALS
OXYGEN SATURATION: 93 % | TEMPERATURE: 97.3 F | SYSTOLIC BLOOD PRESSURE: 150 MMHG | RESPIRATION RATE: 18 BRPM | BODY MASS INDEX: 19.37 KG/M2 | HEIGHT: 67 IN | HEART RATE: 80 BPM | WEIGHT: 123.4 LBS | DIASTOLIC BLOOD PRESSURE: 71 MMHG

## 2025-03-07 DIAGNOSIS — C22.0 HEPATOCELLULAR CARCINOMA: Primary | ICD-10-CM

## 2025-03-07 DIAGNOSIS — C22.0 HEPATOCELLULAR CARCINOMA: ICD-10-CM

## 2025-03-07 LAB
ALBUMIN SERPL-MCNC: 3.7 G/DL (ref 3.5–5.2)
ALBUMIN/GLOB SERPL: 1.2 G/DL
ALP SERPL-CCNC: 76 U/L (ref 39–117)
ALT SERPL W P-5'-P-CCNC: 30 U/L (ref 1–33)
ANION GAP SERPL CALCULATED.3IONS-SCNC: 9.1 MMOL/L (ref 5–15)
AST SERPL-CCNC: 38 U/L (ref 1–32)
BASOPHILS # BLD AUTO: 0.08 10*3/MM3 (ref 0–0.2)
BASOPHILS NFR BLD AUTO: 1.2 % (ref 0–1.5)
BILIRUB SERPL-MCNC: 0.9 MG/DL (ref 0–1.2)
BILIRUB UR QL STRIP: NEGATIVE
BUN SERPL-MCNC: 11 MG/DL (ref 8–23)
BUN/CREAT SERPL: 16.7 (ref 7–25)
CALCIUM SPEC-SCNC: 9.7 MG/DL (ref 8.6–10.5)
CHLORIDE SERPL-SCNC: 107 MMOL/L (ref 98–107)
CLARITY UR: CLEAR
CO2 SERPL-SCNC: 22.9 MMOL/L (ref 22–29)
COLOR UR: ABNORMAL
CREAT SERPL-MCNC: 0.66 MG/DL (ref 0.57–1)
DEPRECATED RDW RBC AUTO: 51.7 FL (ref 37–54)
EGFRCR SERPLBLD CKD-EPI 2021: 96.3 ML/MIN/1.73
EOSINOPHIL # BLD AUTO: 0.55 10*3/MM3 (ref 0–0.4)
EOSINOPHIL NFR BLD AUTO: 7.9 % (ref 0.3–6.2)
ERYTHROCYTE [DISTWIDTH] IN BLOOD BY AUTOMATED COUNT: 14.2 % (ref 12.3–15.4)
GLOBULIN UR ELPH-MCNC: 3.2 GM/DL
GLUCOSE SERPL-MCNC: 103 MG/DL (ref 65–99)
GLUCOSE UR STRIP-MCNC: NEGATIVE MG/DL
HCT VFR BLD AUTO: 45.5 % (ref 34–46.6)
HGB BLD-MCNC: 15.5 G/DL (ref 12–15.9)
HGB UR QL STRIP.AUTO: NEGATIVE
IMM GRANULOCYTES # BLD AUTO: 0.02 10*3/MM3 (ref 0–0.05)
IMM GRANULOCYTES NFR BLD AUTO: 0.3 % (ref 0–0.5)
KETONES UR QL STRIP: NEGATIVE
LEUKOCYTE ESTERASE UR QL STRIP.AUTO: NEGATIVE
LYMPHOCYTES # BLD AUTO: 1.58 10*3/MM3 (ref 0.7–3.1)
LYMPHOCYTES NFR BLD AUTO: 22.8 % (ref 19.6–45.3)
MCH RBC QN AUTO: 33.8 PG (ref 26.6–33)
MCHC RBC AUTO-ENTMCNC: 34.1 G/DL (ref 31.5–35.7)
MCV RBC AUTO: 99.1 FL (ref 79–97)
MONOCYTES # BLD AUTO: 0.84 10*3/MM3 (ref 0.1–0.9)
MONOCYTES NFR BLD AUTO: 12.1 % (ref 5–12)
NEUTROPHILS NFR BLD AUTO: 3.87 10*3/MM3 (ref 1.7–7)
NEUTROPHILS NFR BLD AUTO: 55.7 % (ref 42.7–76)
NITRITE UR QL STRIP: NEGATIVE
NRBC BLD AUTO-RTO: 0 /100 WBC (ref 0–0.2)
PH UR STRIP.AUTO: 6.5 [PH] (ref 5–8)
PLATELET # BLD AUTO: 127 10*3/MM3 (ref 140–450)
PMV BLD AUTO: 9.4 FL (ref 6–12)
POTASSIUM SERPL-SCNC: 3.7 MMOL/L (ref 3.5–5.2)
PROT SERPL-MCNC: 6.9 G/DL (ref 6–8.5)
PROT UR QL STRIP: NEGATIVE
RBC # BLD AUTO: 4.59 10*6/MM3 (ref 3.77–5.28)
SODIUM SERPL-SCNC: 139 MMOL/L (ref 136–145)
SP GR UR STRIP: 1.01 (ref 1–1.03)
T4 FREE SERPL-MCNC: 0.85 NG/DL (ref 0.92–1.68)
TSH SERPL DL<=0.05 MIU/L-ACNC: 3.96 UIU/ML (ref 0.27–4.2)
UROBILINOGEN UR QL STRIP: ABNORMAL
WBC NRBC COR # BLD AUTO: 6.94 10*3/MM3 (ref 3.4–10.8)

## 2025-03-07 PROCEDURE — 80053 COMPREHEN METABOLIC PANEL: CPT | Performed by: INTERNAL MEDICINE

## 2025-03-07 PROCEDURE — 84443 ASSAY THYROID STIM HORMONE: CPT | Performed by: INTERNAL MEDICINE

## 2025-03-07 PROCEDURE — 96413 CHEMO IV INFUSION 1 HR: CPT

## 2025-03-07 PROCEDURE — 25010000002 ATEZOLIZUMAB 1200 MG/20ML SOLUTION 20 ML VIAL: Performed by: INTERNAL MEDICINE

## 2025-03-07 PROCEDURE — 1126F AMNT PAIN NOTED NONE PRSNT: CPT | Performed by: INTERNAL MEDICINE

## 2025-03-07 PROCEDURE — 99214 OFFICE O/P EST MOD 30 MIN: CPT | Performed by: INTERNAL MEDICINE

## 2025-03-07 PROCEDURE — 85025 COMPLETE CBC W/AUTO DIFF WBC: CPT | Performed by: INTERNAL MEDICINE

## 2025-03-07 PROCEDURE — 81003 URINALYSIS AUTO W/O SCOPE: CPT | Performed by: INTERNAL MEDICINE

## 2025-03-07 PROCEDURE — 84439 ASSAY OF FREE THYROXINE: CPT | Performed by: INTERNAL MEDICINE

## 2025-03-07 PROCEDURE — 25810000003 SODIUM CHLORIDE 0.9 % SOLUTION 250 ML FLEX CONT: Performed by: INTERNAL MEDICINE

## 2025-03-07 RX ORDER — SODIUM CHLORIDE 9 MG/ML
20 INJECTION, SOLUTION INTRAVENOUS ONCE
Status: DISCONTINUED | OUTPATIENT
Start: 2025-03-07 | End: 2025-03-07

## 2025-03-07 RX ADMIN — ATEZOLIZUMAB 1200 MG: 1200 INJECTION, SOLUTION INTRAVENOUS at 14:19

## 2025-03-07 NOTE — PROGRESS NOTES
Name:  Noemy Brooks  :  1960  Date:  3/7/2025     REFERRING PHYSICIAN  Henri Melvin MD    PRIMARY CARE PHYSICIAN  Lynn, Haley Roca DO    REASON FOR FOLLOWUP  1. Hepatocellular carcinoma      CHIEF COMPLAINT  None.    Dear Dr. Bailey,    HISTORY OF PRESENT ILLNESS:   I saw Ms. Brooks in followup today in our medical oncology clinic. As you are aware, she is a pleasant, 65 y.o., white female with a history of hypertension, hepatitis C (treated with antivirals and reportedly cured in ~), cirrhosis and hepatocellular carcinoma who was initially diagnosed with the latter in ~2022. [place], [place]. Her [place] gastroenterologist referred her to  at that time once she was found to have a couple of liver masses and a serum AFP > 1,000 ng/mL. She was evaluated by the liver transplant service; however, due to the HCC's involvement of the hepatic vessels, she was felt to not be a candidate (for transplant). She was subsequently evaluated by  medical oncology, who recommended starting first-line, palliative treatment with a combination of p9vfjzge bevacizumab (Mvasi) and atezolizumab (Tecentriq), per the current standard of care. She received a total of eight (8) cycles between 2022 and early 2023 through the [facility]. Due to transportation issues (her sister has to be the one to drive her, and the trips to [place] have been getting increasingly difficult for her), she presented to our clinic in 2023 in order to transfer her ongoing, oncologic care to us, closer to her home in [place], [place]. She tolerated e5ptxkol atezolizumab overall well, and she received a total of eighteen (18) cycles between  and early 2023. At that time and per her preference, we have discontinued this therapy. She previously stated that the Lord cured her once before (of Hepatitis C), and she became convinced that he has/will do it again (this time for her HCC). She did overall well  for the next ~year, and her chronic fatigue did improve; but, unfortunately, by early 2024, repeat imaging was consistent with reprogressive disease. She was agreeable to restarting Imfinzi and Avastin at that time, and she did so on 2025.    INTERIM HISTORY:  Ms. Brooks returns to clinic today for follow up by herself. Due to her history of cirrhosis, she has been deemed to be too a high of a risk to undergo the cholecystectomy she had previously been pursuing. She has now completed four (4), c9lzmjkc cycles since restarting atezolizumab/bevacizumab in early 2024. She reiterates today that she seems to be tolerating this therapy fairly well; however, her chronic fatigue has, as expected, increased. She has no new or other specific complaints in clinic today.    Past Medical History:   Diagnosis Date    Acid reflux     Aortic aneurysm     Cancer     LIVER, UTERINE    Cirrhosis     COPD (chronic obstructive pulmonary disease)     Depression     Disease of thyroid gland     Elevated cholesterol     High cholesterol     History of transfusion     Hypertension     Infectious viral hepatitis         Osteoporosis     Ovarian cancer        Past Surgical History:   Procedure Laterality Date    APPENDECTOMY N/A     COLONOSCOPY      ENDOSCOPY N/A 2023    Procedure: ESOPHAGOGASTRODUODENOSCOPY WITH BIOPSY;  Surgeon: Rahel Sesay MD;  Location: [facility];  Service: Gastroenterology;  Laterality: N/A;    FRACTURE SURGERY Left     ARM, HAS 2 SCREWS    HYSTERECTOMY      UPPER GASTROINTESTINAL ENDOSCOPY         Social History     Socioeconomic History    Marital status:     Number of children: 3   Tobacco Use    Smoking status: Former     Current packs/day: 0.00     Average packs/day: 1.5 packs/day for 25.0 years (37.5 ttl pk-yrs)     Types: Cigarettes     Start date: 1990     Quit date: 2015     Years since quittin.8    Smokeless tobacco: Never    Tobacco comments:      smoked for approx 20 years, 1.5 to 2 ppd   Vaping Use    Vaping status: Never Used   Substance and Sexual Activity    Alcohol use: No     Comment: she previously drank daily for 16 years, beer    Drug use: No    Sexual activity: Defer       Family History   Problem Relation Age of Onset    Stroke Mother     Hypertension Mother     COPD Mother     Throat cancer Mother     Arthritis Mother     Asthma Mother     Heart attack Mother     Stroke Father     Cancer Father     Liver cancer Father        Allergies   Allergen Reactions    Duloxetine Hcl Other (See Comments)     Cymbalta    Codeine GI Intolerance       Current Outpatient Medications   Medication Sig Dispense Refill    albuterol sulfate  (90 Base) MCG/ACT inhaler Inhale 2 puffs Every 4 (Four) Hours As Needed for Wheezing.      Antacid/Antigas 400-400-40 MG/10ML suspension Take 5 mL by mouth Every 6 (Six) Hours As Needed.      Atezolizumab (TECENTRIQ IV) Infuse  into a venous catheter Every 21 (Twenty-One) Days.      B Complex Vitamins (VITAMIN B COMPLEX PO) Take  by mouth.      budesonide-formoterol (SYMBICORT) 160-4.5 MCG/ACT inhaler Inhale 2 puffs 2 (Two) Times a Day.      cholecalciferol (VITAMIN D3) 25 MCG (1000 UT) tablet Take 1 tablet by mouth Daily.      glycerin adult 2 g suppository Insert 1 suppository into the rectum As Needed.      levothyroxine (SYNTHROID, LEVOTHROID) 25 MCG tablet Take 1 tablet by mouth Every Morning. 30 tablet 5    Magnesium Hydroxide (MILK OF MAGNESIA PO) Take  by mouth.      multivitamin (MULTI VITAMIN DAILY PO) Take 1 tablet by mouth Daily.      ondansetron ODT (ZOFRAN-ODT) 4 MG disintegrating tablet Place 1 tablet on the tongue Every 6 (Six) Hours As Needed for Nausea or Vomiting. 12 tablet 0    oxyCODONE (Roxicodone) 5 MG immediate release tablet Take 1 tablet by mouth Every 6 (Six) Hours As Needed for Severe Pain. 10 tablet 0    pantoprazole (PROTONIX) 40 MG EC tablet Take 1 tablet by mouth Daily. 30 tablet 0     "traZODone (DESYREL) 100 MG tablet Take 1 tablet by mouth Every Night.      Trelegy Ellipta 200-62.5-25 MCG/ACT inhaler        No current facility-administered medications for this visit.     REVIEW OF SYSTEMS  CONSTITUTIONAL:  No fever, chills or night sweats. Chronic fatigue, recently reworsened since restarting palliative immunotherapy/targeted therapy in 2024.  EYES:  No blurry vision, diplopia or other vision changes.  ENT:  No hearing loss, nosebleeds or sore throat.  CARDIOVASCULAR:  No palpitations, arrhythmia, syncopal episodes or edema.  PULMONARY:  No hemoptysis, wheezing, chronic cough or shortness of breath.  GASTROINTESTINAL:  As per the HPI above.  GENITOURINARY:  No hematuria, kidney stones or frequent urination.  MUSCULOSKELETAL:  No joint or back pains.  INTEGUMENTARY: No rashes or pruritus.  ENDOCRINE:  No excessive thirst or hot flashes.  HEMATOLOGIC:  No history of free bleeding, spontaneous bleeding or clotting.  IMMUNOLOGIC:  No allergies or frequent infections.  NEUROLOGIC: No numbness, tingling, seizures or weakness.  PSYCHIATRIC:  No anxiety or depression.    PHYSICAL EXAMINATION  /71   Pulse 80   Temp 97.3 °F (36.3 °C) (Temporal)   Resp 18   Ht 170.2 cm (67\")   Wt 56 kg (123 lb 6.4 oz)   SpO2 93%   BMI 19.33 kg/m²     Pain Score:  Pain Score    25 1237   PainSc: 0-No pain     PHQ-Score Total:  PHQ-9 Total Score:      ECO  GENERAL:  A well-developed, well-nourished, thin, white female in no acute distress.  HEENT:  Pupils equally round and reactive to light. Extraocular muscles intact.  CARDIOVASCULAR:  Regular rate and rhythm. No murmurs, gallops or rubs.  LUNGS:  Clear to auscultation bilaterally.  ABDOMEN:  Soft, nontender, nondistended with positive bowel sounds.  EXTREMITIES:  No clubbing, cyanosis or edema bilaterally.  SKIN:  No rashes or petechiae.  NEURO:  Cranial nerves grossly intact. No focal deficits.  PSYCH:  Alert and oriented x3.    The " physical exam is unchanged from recent priors.    LABORATORY  Lab Results   Component Value Date    WBC 6.94 03/07/2025    HGB 15.5 03/07/2025    HCT 45.5 03/07/2025    MCV 99.1 (H) 03/07/2025     (L) 03/07/2025    NEUTROABS 3.87 03/07/2025       Lab Results   Component Value Date     03/07/2025    K 3.7 03/07/2025     03/07/2025    CO2 22.9 03/07/2025    BUN 11 03/07/2025    CREATININE 0.66 03/07/2025    GLUCOSE 103 (H) 03/07/2025    CALCIUM 9.7 03/07/2025    AST 38 (H) 03/07/2025    ALT 30 03/07/2025    ALKPHOS 76 03/07/2025    BILITOT 0.9 03/07/2025    PROTEINTOT 6.9 03/07/2025    ALBUMIN 3.7 03/07/2025     CBC (03/07/2025): WBCs: 6.94; HgB: 15.5; Hct: 45.5; platelets: 127  CBC (01/15/2025): WBCs: 5.42; HgB: 14.5; Hct: 43.0; platelets: 128  CBC (11/14/2024): WBCs: 5.97; HgB: 13.1; Hct: 39,8; platelets: 124  CBC (08/14/2024): WBCs: 6.68; HgB: 13.9; Hct: 42.2; platelets: 170  CBC (05/21/2024): WBCs: 4.92; HgB: 13.9; Hct: 40.9; platelets: 126  CBC (02/21/2024): WBCs: 6.29; HgB: 14.3; Hct: 42.9; platelets: 150  CBC (12/19/2023): WBCs: 5.64; HgB: 14.5; Hct: 44.2; platelets: 120  CBC (10/19/2023): WBCs: 6.05; HgB: 14.4; Hct: 43.7; platelets: 119  CBC (08/16/2023): WBCs: 5.87; HgB: 14.3; Hct: 44.2; platelets: 120  CBC (05/24/2023): WBCs: 7.45; HgB: 15.8; Hct: 47.0; platelets: 125  CBC (05/01/2023): WBCs: 6.91; HgB: 15.9; Hct: 47.7; platelets: 118  CBC (04/03/2023): WBCs: 6.68; HgB: 15.9; Hct: 45.7; platelets: 116; MCV: 94    AFP (03/07/2025): pending  AFP (01/15/2025): 22.2 ng/mL  AFP (11/14/2024): 36.6 ng/mL  AFP (09/25/2024): 19.4 ng/mL  AFP (08/14/2024): 15.10 ng/mL  AFP (05/21/2024): 9.44 ng/mL  AFP (02/21/2024): 7.07 ng/mL  AFP (12/19/2023): 5.88 ng/mL  AFP (11/09/2023): 6.00 ng.mL  AFP (09/28/2023): 5.55 ng/mL  AFP (08/16/2023): 3.96 ng/mL  AFP (07/06/2023): 3.33 ng/mL  AFP (05/24/2023): 2.66 ng/mL  AFP (05/01/2023): < 2 ng/mL  AFP (01/27/2023): 2.6 ng/mL  AFP (10/31/2022): 1031.0  ng/mL    IMAGING  CT liver (10/21/2022, at ):  Impression: LR 5 segment 7 lesion measuring up to 2.8 x 2.5 cm with associated TIV (tumor in vein). LR 3 legion measuring 8 mm in segment 4A.    CT chest, abdomen and pelvis with contrast (01/27/2023, at ):  Impression:  Chest: No convincing metastatic disease in the thorax.  Abdomen/Pelvis: Within the limits of the study, the area of washout representing the known HCC is stable to smaller in size. The component representing the tumor in vein is smaller in size. No new liver lesions. No distant metastatic disease.    MRI abdomen with and without contrast (04/13/2023):  Impression:  1) No solid arterial phase enhancing liver lesions identified. No delayed phase enhancing liver lesions are noted.  2) Liver cirrhosis and portal hypertension.  3) 0.6 cm simple appearing benign hepatic cyst right lobe.  4) Simple appearing cyst right kidney. No hydronephrosis.  5) Other nonacute findings.    MRI abdomen with and without contrast (11/03/2023, compared to 04/13/2023):  Impression: Little overall change. No contrast-enhancing hepatic lesions are identified.    MRI abdomen with and without contrast (05/14/2024, compared to MRI on 11/03/2023):  Impression:  1) Development of two arterial phase enhancing lesions immediately adjacent to each other localizing to segment 7/8 of the right hepatic lobe worrisome for primary HCC given extensive background changes of liver cirrhosis. Metastatic disease is also within the differential.  2) Stable simple appearing cyst right hepatic lobe.  3) Cholelithiasis.  4) Portal hypertension changes which include presence of splenorenal shunts.  5) Other incidental/nonacute findings.    CT abdomen and pelvis without contrast (06/17/2024, compared to 03/17/2024):  Impression:  1) Advanced liver cirrhosis.  2) Mild splenomegaly.  3) Prominent portosystemic collateral vessels compatible with portal hypertension. No ascites.  4) Cholelithiasis.  5)  Mild constipation. No bowel obstruction.  6) Prior appendectomy and hysterectomy. Other incidental/nonacute findings.    MRI abdomen with and without contrast (08/05/2024, compared to 05/14/2024):  Impression:  1) As noted previously, two, arterial phase enhancing lesions dome of liver segment 7/8 not significantly changed in configuration from the previous exam.  2) Larger lesion is 13.1 mm and was previously 11.7 mm but could be due to differences in technique.  3) The smaller lesion is 7.2 mm and was previously 6.9 mm.  4) Segmental type enhancement is noted in the caudate lobe which may be in part related that is approximately 2.63 cm. Diffusion restriction is noted raising suspicious for separate neoplastic lesion. Area was previously obscured by motion artifact and proximity of the duodenum.  5) Small faby hepatis region lymph nodes are noted one of which is 1.45 cm and a smaller node that is 0.81 cm and were previously too small to characterize.  6) Advanced liver cirrhosis, stable. Changes of portal hypertension are again noted and stable.  7) Cholelithiasis, stable.  8) Simple appearing cyst right lobe liver, stable.    Ultrasound gallbladder (08/31/2024):  Impression:  1) Few small gallstones. Despite a reportedly positive sonographic Zamora's sign, no other sonographic features to suggest cholecystitis    CT abdomen and pelvis with contrast (09/06/2024):  Impression: Focal hepatic observations. Li-Rads-TIV (definitely tumor in vein) and Li-Rads 5 (definite HCC).    MRI abdomen with and without contrast (11/08/2024, compared to 08/05/2024):  Impression:  1) Two lesions again noted in segement 7/8 of the liver that show arterial phase enhancement. Larger lesion is 15.2 mm and was previously 13.1 mm. Smaller lesion is 9.9 mm and was previously 7.2 mm. Overall increased in size.  2) Size increase of caudate lobe arterial phase enhancing lesion now 34.6 mm and was previously 26.3 mm.  3) Increased size of  faby hepatis enhancing enlarged lymph nodes. Larger lymph node is 20.2 mm and was previously 14.5 mm. The smaller adjacent lymph node is 11.2 mm and was previously 8.1 mm.  4) Tiny segment 3 liver lesion is 5.4 mm with arterial phase enhancement and was not previously well characterized due to motion artifact.  5) Increased signal changes noted of the portal vein and more specifically the left portal venous branches suspicious for portal vein thrombosis. Correlate with ultrasound Doppler assessment.  6) Advanced liver cirrhosis with portal hypertension changes, stable.  7) Other nonacute findings stable from previous.    MRI abdomen with and without contrast (03/05/2025, compared to 11/08/2024):  Impression:  1) Decreased size of right lobe of liver lesion.  2) Decreased size of right lobe of liver lesion.  3) Decreased size of caudate lobe lesion.  4) Decreased size of faby hepatis lesion.  5) Decreased size of faby hepatis region lymph node.  6) Nodular cirrhotic contour of the liver.    PATHOLOGY    IMPRESSION AND PLAN  Ms. Brooks is a 65 y.o., white female with:  Hepatocellular carcinoma: Initially diagnosed in Fall 2022 after a CT of the liver (performed on 10/21/2022 at , summarized above) confirmed the presence of two lesions (one measuring 2.8 x 2.5 cm in segment 7 and a probable satellite measuring ~8 mm in segment 4A) in the setting of known, baseline cirrhosis and a serum AFP level of > 1000 ng/mL. Due to venous involvement by the tumor, she was/is not a candidate for a liver transplant.  medical oncology therefore recommended initiating first-line, palliative, systemic treatment with a combination of x5ebkurn bevacizumab (the Avastin biosimilar Mvasi) and y0xxlbgo atezolizumab (Tecentriq). She received a total of eight (8), q5tabfcz cycles through the [facility] between Fall 2022 and early April 2023 (the eighth and final cycle of both were given on 04/03/2023). With this therapy, MRIs  of the abdomen (liver protocol) performed on 04/13/2023 and 11/03/2023 (both summarized above) showed no visible signs of a liver mass at all. Also with the therapy she has received to date, her serum AFP level declined from >1000 ng/mL in late October 2023 to solidly WNL by January 2023), consistent with a complete response in her disease. I therefore had a long discussion with the patient and her sister in Spring 2023 regarding our treatment recommendations from that point. In short, the potential risks of any additional cycles of Avastin (particularly bleeding; she has an aortic aneurysm in addition to her baseline cirrhosis) likely outweighed any potential, minimal future benefit at that time); however, continuing indefinite, m8bajvuq atezolizumab continued to be recommended (as the current standard of care; it appears to have worked extremely well). She completed a total of eighteen (18) cycles between then and November 2023. At that time, it was her preference that we place this therapy on indefinite hold, as she was convinced the Lord had healed her again (this time of her HCC); and she believed that the indefinite atezolizumab was causing her chronic, quality-of-life limiting fatigue. While remaining fully aware of the potential risks of discontinuing this medication, she did overall clinically well over the course of the following year; however, during that time, intermittent repeat MRIs of the abdomen (performed on 05/14/2024, 08/05/2024 and 11/08/2024, all summarized above) did show, slowly, but steadily, relapsing disease, with two arterial phase enhancing lesions immediately adjacent to each other localizing to segment 7/8 now visible, and steadily reenlarging, again. By late 2024, she was (finally) agreeable to restarting palliative therapy, and she did so on 12/05/2024. She has now completed a total of four (4) cycles of m9kdkeje atezolizumab/bevacizumab since restarting it, and she is overall  "tolerating it fairly well again (with, not surprisingly, reincreased fatigue as her only noticeable side effect). With this therapy, the most recent repeat MRI of the abdomen (performed on 2025 and summarized above) now shows that her disease is responding again. We would otherwise proceed with this current treatment plan for now; however, the patient stated today that she does not wish to receive the bevacizumab anymore (at least at this time). We will therefore proceed with q2kzcylq atezolizumab by itself (with the fifth cycle today). We will see her back in our clinic in three months, on the day of the ninth cycle (in early ), with a CBC, CMP, TSH, AFP and repeat MRI of the abdomen with and without contrast.  Cirrhosis: Likely secondary to a longstanding history of both issue #3 (which was diagnosed and reportedly cured in ~, probably decades after she initially contracted it through her ) and alcohol abuse (she drank \"a lot\" of beer every day for ~twenty years but quit in the ). Currently still compensated. Ongoing management per gastroenterology/hepatology.  Hepatitis C: Reportedly contracted through her  (who ultimately  from it) without her knowledge, but also reportedly diagnosed and cured with a months-long course of antivirals in ~. Ongoing management per gastroenterology/hepatology.  Protein calorie malnutrition: Recently still improved. Continue Marinol 2.5 mg PO BID. Continue to monitor.  Gallbladder issues: Due to issue #2, a couple of different surgeons have now recommended againist pursuing a cholecystectomy at this time. Continue to monitor.  Aortic aneurysm: Ongoing monitoring per CT/vascular surgery.  Fatigue: Multifactorial, with recently restarted palliative immunotherapy likely contributing. Continue Synthroid and routine TSH/T4 monitoring. Continue to monitor.  The patient was in agreement with these plans.    It is a pleasure to participate in Ms. " Todd's care. Please do not hesitate to call with any questions or concerns that you may have.    A total of 30 minutes were spent coordinating this patient’s care in clinic today; more than 50% of this time was face-to-face with the patient, reviewing her interim medical history, discussing the results of this week's repeat MRI of the abdomen and counseling on the current treatment and followup plan. All questions were answered to her satisfaction.    FOLLOW UP  Discontinue bevacizumab, per patient preference, at least for now. Proceed with a6skczib atezolizumab, as planned (with the 5th cycle, since she restarted this therapy, today). Return to our clinic in 3 months, on the day of the 9th cycle of atezolizumab/bevacizumab (in ~early June), with a CBC, CMP, TSH, AFP and repeat MRI of the abdomen with and without contrast.          This document was electronically signed by NADEGE Boss MD March 7, 2025 15:04 EST      CC: DO Henri Davis MD Hao Zhonglin, MD Erika G. Almodovar, MD John H. Chaney, MD

## 2025-03-07 NOTE — PROGRESS NOTES
Venipuncture Blood Specimen Collection  Venipuncture performed in left arm by Macarena Brar MA with good hemostasis. Patient tolerated the procedure well without complications.   03/07/25   Macarena Brar MA

## 2025-03-25 DIAGNOSIS — C22.0 HEPATOCELLULAR CARCINOMA: Primary | ICD-10-CM

## 2025-03-25 RX ORDER — SODIUM CHLORIDE 9 MG/ML
20 INJECTION, SOLUTION INTRAVENOUS ONCE
OUTPATIENT
Start: 2025-05-30

## 2025-03-25 RX ORDER — SODIUM CHLORIDE 9 MG/ML
20 INJECTION, SOLUTION INTRAVENOUS ONCE
Status: CANCELLED | OUTPATIENT
Start: 2025-03-28

## 2025-03-25 RX ORDER — SODIUM CHLORIDE 9 MG/ML
20 INJECTION, SOLUTION INTRAVENOUS ONCE
OUTPATIENT
Start: 2025-04-18

## 2025-03-25 RX ORDER — SODIUM CHLORIDE 9 MG/ML
20 INJECTION, SOLUTION INTRAVENOUS ONCE
OUTPATIENT
Start: 2025-05-09

## 2025-03-25 RX ORDER — SODIUM CHLORIDE 9 MG/ML
20 INJECTION, SOLUTION INTRAVENOUS ONCE
OUTPATIENT
Start: 2025-06-20

## 2025-03-28 ENCOUNTER — INFUSION (OUTPATIENT)
Dept: ONCOLOGY | Facility: HOSPITAL | Age: 65
End: 2025-03-28
Payer: MEDICARE

## 2025-03-28 ENCOUNTER — TELEPHONE (OUTPATIENT)
Dept: ONCOLOGY | Facility: CLINIC | Age: 65
End: 2025-03-28
Payer: MEDICARE

## 2025-03-28 ENCOUNTER — LAB (OUTPATIENT)
Dept: ONCOLOGY | Facility: HOSPITAL | Age: 65
End: 2025-03-28
Payer: MEDICARE

## 2025-03-28 VITALS
OXYGEN SATURATION: 93 % | DIASTOLIC BLOOD PRESSURE: 78 MMHG | BODY MASS INDEX: 19.5 KG/M2 | SYSTOLIC BLOOD PRESSURE: 157 MMHG | RESPIRATION RATE: 20 BRPM | WEIGHT: 124.5 LBS | TEMPERATURE: 97.6 F | HEART RATE: 72 BPM

## 2025-03-28 DIAGNOSIS — C22.0 HEPATOCELLULAR CARCINOMA: Primary | ICD-10-CM

## 2025-03-28 DIAGNOSIS — C22.0 HEPATOCELLULAR CARCINOMA: ICD-10-CM

## 2025-03-28 LAB
ALBUMIN SERPL-MCNC: 3.7 G/DL (ref 3.5–5.2)
ALBUMIN/GLOB SERPL: 1 G/DL
ALP SERPL-CCNC: 81 U/L (ref 39–117)
ALT SERPL W P-5'-P-CCNC: 28 U/L (ref 1–33)
ANION GAP SERPL CALCULATED.3IONS-SCNC: 9.9 MMOL/L (ref 5–15)
AST SERPL-CCNC: 39 U/L (ref 1–32)
BASOPHILS # BLD AUTO: 0.09 10*3/MM3 (ref 0–0.2)
BASOPHILS NFR BLD AUTO: 1.5 % (ref 0–1.5)
BILIRUB SERPL-MCNC: 0.7 MG/DL (ref 0–1.2)
BUN SERPL-MCNC: 12 MG/DL (ref 8–23)
BUN/CREAT SERPL: 17.9 (ref 7–25)
CALCIUM SPEC-SCNC: 9.9 MG/DL (ref 8.6–10.5)
CHLORIDE SERPL-SCNC: 108 MMOL/L (ref 98–107)
CO2 SERPL-SCNC: 25.1 MMOL/L (ref 22–29)
CREAT SERPL-MCNC: 0.67 MG/DL (ref 0.57–1)
DEPRECATED RDW RBC AUTO: 53.9 FL (ref 37–54)
EGFRCR SERPLBLD CKD-EPI 2021: 95.9 ML/MIN/1.73
EOSINOPHIL # BLD AUTO: 0.48 10*3/MM3 (ref 0–0.4)
EOSINOPHIL NFR BLD AUTO: 8 % (ref 0.3–6.2)
ERYTHROCYTE [DISTWIDTH] IN BLOOD BY AUTOMATED COUNT: 14.3 % (ref 12.3–15.4)
GLOBULIN UR ELPH-MCNC: 3.7 GM/DL
GLUCOSE SERPL-MCNC: 88 MG/DL (ref 65–99)
HCT VFR BLD AUTO: 45.3 % (ref 34–46.6)
HGB BLD-MCNC: 15.2 G/DL (ref 12–15.9)
IMM GRANULOCYTES # BLD AUTO: 0.01 10*3/MM3 (ref 0–0.05)
IMM GRANULOCYTES NFR BLD AUTO: 0.2 % (ref 0–0.5)
LYMPHOCYTES # BLD AUTO: 1.48 10*3/MM3 (ref 0.7–3.1)
LYMPHOCYTES NFR BLD AUTO: 24.7 % (ref 19.6–45.3)
MCH RBC QN AUTO: 33.8 PG (ref 26.6–33)
MCHC RBC AUTO-ENTMCNC: 33.6 G/DL (ref 31.5–35.7)
MCV RBC AUTO: 100.7 FL (ref 79–97)
MONOCYTES # BLD AUTO: 0.66 10*3/MM3 (ref 0.1–0.9)
MONOCYTES NFR BLD AUTO: 11 % (ref 5–12)
NEUTROPHILS NFR BLD AUTO: 3.26 10*3/MM3 (ref 1.7–7)
NEUTROPHILS NFR BLD AUTO: 54.6 % (ref 42.7–76)
NRBC BLD AUTO-RTO: 0 /100 WBC (ref 0–0.2)
PLATELET # BLD AUTO: 119 10*3/MM3 (ref 140–450)
PMV BLD AUTO: 9.7 FL (ref 6–12)
POTASSIUM SERPL-SCNC: 3.5 MMOL/L (ref 3.5–5.2)
PROT SERPL-MCNC: 7.4 G/DL (ref 6–8.5)
RBC # BLD AUTO: 4.5 10*6/MM3 (ref 3.77–5.28)
SODIUM SERPL-SCNC: 143 MMOL/L (ref 136–145)
WBC NRBC COR # BLD AUTO: 5.98 10*3/MM3 (ref 3.4–10.8)

## 2025-03-28 PROCEDURE — 25010000002 ATEZOLIZUMAB 1200 MG/20ML SOLUTION 20 ML VIAL: Performed by: INTERNAL MEDICINE

## 2025-03-28 PROCEDURE — 25810000003 SODIUM CHLORIDE 0.9 % SOLUTION 250 ML FLEX CONT: Performed by: INTERNAL MEDICINE

## 2025-03-28 PROCEDURE — 85025 COMPLETE CBC W/AUTO DIFF WBC: CPT

## 2025-03-28 PROCEDURE — 96413 CHEMO IV INFUSION 1 HR: CPT

## 2025-03-28 PROCEDURE — 80053 COMPREHEN METABOLIC PANEL: CPT

## 2025-03-28 RX ORDER — SODIUM CHLORIDE 9 MG/ML
20 INJECTION, SOLUTION INTRAVENOUS ONCE
Status: DISCONTINUED | OUTPATIENT
Start: 2025-03-28 | End: 2025-03-28 | Stop reason: HOSPADM

## 2025-03-28 RX ADMIN — ATEZOLIZUMAB 1200 MG: 1200 INJECTION, SOLUTION INTRAVENOUS at 14:40

## 2025-03-28 NOTE — TELEPHONE ENCOUNTER
RN called [facility] prescription to [facility]; RN attempted to call patient with this information but was unable to reach patient, however, pharmacy did say they would contact patient if there was a copay.

## 2025-04-18 ENCOUNTER — LAB (OUTPATIENT)
Dept: ONCOLOGY | Facility: HOSPITAL | Age: 65
End: 2025-04-18
Payer: MEDICARE

## 2025-04-18 ENCOUNTER — INFUSION (OUTPATIENT)
Dept: ONCOLOGY | Facility: HOSPITAL | Age: 65
End: 2025-04-18
Payer: MEDICARE

## 2025-04-18 VITALS
TEMPERATURE: 97.6 F | BODY MASS INDEX: 19.53 KG/M2 | OXYGEN SATURATION: 93 % | RESPIRATION RATE: 18 BRPM | WEIGHT: 124.7 LBS | SYSTOLIC BLOOD PRESSURE: 138 MMHG | DIASTOLIC BLOOD PRESSURE: 66 MMHG | HEART RATE: 74 BPM

## 2025-04-18 DIAGNOSIS — C22.0 HEPATOCELLULAR CARCINOMA: ICD-10-CM

## 2025-04-18 DIAGNOSIS — C22.0 HEPATOCELLULAR CARCINOMA: Primary | ICD-10-CM

## 2025-04-18 LAB
ALBUMIN SERPL-MCNC: 3.8 G/DL (ref 3.5–5.2)
ALBUMIN/GLOB SERPL: 1.1 G/DL
ALP SERPL-CCNC: 86 U/L (ref 39–117)
ALT SERPL W P-5'-P-CCNC: 33 U/L (ref 1–33)
ANION GAP SERPL CALCULATED.3IONS-SCNC: 7.4 MMOL/L (ref 5–15)
AST SERPL-CCNC: 41 U/L (ref 1–32)
BASOPHILS # BLD AUTO: 0.09 10*3/MM3 (ref 0–0.2)
BASOPHILS NFR BLD AUTO: 1.4 % (ref 0–1.5)
BILIRUB SERPL-MCNC: 0.9 MG/DL (ref 0–1.2)
BUN SERPL-MCNC: 7 MG/DL (ref 8–23)
BUN/CREAT SERPL: 11.1 (ref 7–25)
CALCIUM SPEC-SCNC: 10.2 MG/DL (ref 8.6–10.5)
CHLORIDE SERPL-SCNC: 107 MMOL/L (ref 98–107)
CO2 SERPL-SCNC: 26.6 MMOL/L (ref 22–29)
CREAT SERPL-MCNC: 0.63 MG/DL (ref 0.57–1)
DEPRECATED RDW RBC AUTO: 54.1 FL (ref 37–54)
EGFRCR SERPLBLD CKD-EPI 2021: 97.4 ML/MIN/1.73
EOSINOPHIL # BLD AUTO: 0.55 10*3/MM3 (ref 0–0.4)
EOSINOPHIL NFR BLD AUTO: 8.5 % (ref 0.3–6.2)
ERYTHROCYTE [DISTWIDTH] IN BLOOD BY AUTOMATED COUNT: 14.4 % (ref 12.3–15.4)
GLOBULIN UR ELPH-MCNC: 3.6 GM/DL
GLUCOSE SERPL-MCNC: 65 MG/DL (ref 65–99)
HCT VFR BLD AUTO: 45.1 % (ref 34–46.6)
HGB BLD-MCNC: 15.3 G/DL (ref 12–15.9)
IMM GRANULOCYTES # BLD AUTO: 0.01 10*3/MM3 (ref 0–0.05)
IMM GRANULOCYTES NFR BLD AUTO: 0.2 % (ref 0–0.5)
LYMPHOCYTES # BLD AUTO: 1.6 10*3/MM3 (ref 0.7–3.1)
LYMPHOCYTES NFR BLD AUTO: 24.7 % (ref 19.6–45.3)
MCH RBC QN AUTO: 34.4 PG (ref 26.6–33)
MCHC RBC AUTO-ENTMCNC: 33.9 G/DL (ref 31.5–35.7)
MCV RBC AUTO: 101.3 FL (ref 79–97)
MONOCYTES # BLD AUTO: 0.83 10*3/MM3 (ref 0.1–0.9)
MONOCYTES NFR BLD AUTO: 12.8 % (ref 5–12)
NEUTROPHILS NFR BLD AUTO: 3.4 10*3/MM3 (ref 1.7–7)
NEUTROPHILS NFR BLD AUTO: 52.4 % (ref 42.7–76)
NRBC BLD AUTO-RTO: 0 /100 WBC (ref 0–0.2)
PLATELET # BLD AUTO: 141 10*3/MM3 (ref 140–450)
PMV BLD AUTO: 9.6 FL (ref 6–12)
POTASSIUM SERPL-SCNC: 3.4 MMOL/L (ref 3.5–5.2)
PROT SERPL-MCNC: 7.4 G/DL (ref 6–8.5)
RBC # BLD AUTO: 4.45 10*6/MM3 (ref 3.77–5.28)
SODIUM SERPL-SCNC: 141 MMOL/L (ref 136–145)
T4 FREE SERPL-MCNC: 0.91 NG/DL (ref 0.92–1.68)
TSH SERPL DL<=0.05 MIU/L-ACNC: 4.59 UIU/ML (ref 0.27–4.2)
WBC NRBC COR # BLD AUTO: 6.48 10*3/MM3 (ref 3.4–10.8)

## 2025-04-18 PROCEDURE — 85025 COMPLETE CBC W/AUTO DIFF WBC: CPT

## 2025-04-18 PROCEDURE — 25010000002 ATEZOLIZUMAB 1200 MG/20ML SOLUTION 20 ML VIAL: Performed by: INTERNAL MEDICINE

## 2025-04-18 PROCEDURE — 80053 COMPREHEN METABOLIC PANEL: CPT

## 2025-04-18 PROCEDURE — 25810000003 SODIUM CHLORIDE 0.9 % SOLUTION: Performed by: INTERNAL MEDICINE

## 2025-04-18 PROCEDURE — 84443 ASSAY THYROID STIM HORMONE: CPT

## 2025-04-18 PROCEDURE — 84439 ASSAY OF FREE THYROXINE: CPT

## 2025-04-18 PROCEDURE — 82105 ALPHA-FETOPROTEIN SERUM: CPT

## 2025-04-18 PROCEDURE — 25810000003 SODIUM CHLORIDE 0.9 % SOLUTION 250 ML FLEX CONT: Performed by: INTERNAL MEDICINE

## 2025-04-18 PROCEDURE — 96413 CHEMO IV INFUSION 1 HR: CPT

## 2025-04-18 RX ORDER — SODIUM CHLORIDE 9 MG/ML
20 INJECTION, SOLUTION INTRAVENOUS ONCE
Status: COMPLETED | OUTPATIENT
Start: 2025-04-18 | End: 2025-04-18

## 2025-04-18 RX ADMIN — SODIUM CHLORIDE 20 ML/HR: 9 INJECTION, SOLUTION INTRAVENOUS at 14:28

## 2025-04-18 RX ADMIN — ATEZOLIZUMAB 1200 MG: 1200 INJECTION, SOLUTION INTRAVENOUS at 14:28

## 2025-04-19 LAB — ALPHA-FETOPROTEIN: 35 NG/ML (ref 0–8.3)

## 2025-04-23 ENCOUNTER — TELEPHONE (OUTPATIENT)
Dept: ONCOLOGY | Facility: CLINIC | Age: 65
End: 2025-04-23

## 2025-04-23 NOTE — TELEPHONE ENCOUNTER
Caller: Papo Brooks    Relationship: Self    Best call back number: [phone]    What is the best time to reach you: ANY    Who are you requesting to speak with (clinical staff, provider,  specific staff member): CLINICAL     What was the call regarding: PAPO IS CALLING STATES THAT A COMPANY [employer] (IT IS A [employer]) WILL BE SENDING A REFERRAL OVER SO THAT PAPO CAN GET A RIDE TO HER APPOINTMENT

## 2025-04-25 NOTE — TELEPHONE ENCOUNTER
Caller: Papo Brooks     Relationship: Self     Best call back number: [phone]     What is the best time to reach you: ANY     Who are you requesting to speak with (clinical staff, provider,  specific staff member): CLINICAL      What was the call regarding: PAPO IS CALLING BACK, NEEDS FOR YOU TO COMPLETE THE WHOLE PAPERWORK FOR [employer] (IT IS A [employer] COMPANY).  THEY RECEIVED BUT IT WAS NOT COMPLETED..     CALL THE COMPANY BACK [phone], TO MAKE SURE ITS ALL FILLED OUT.    CALL PATIENT BACK TO LET HER KNOW YOU COMPLETED

## 2025-05-02 ENCOUNTER — TELEPHONE (OUTPATIENT)
Dept: SOCIAL WORK | Facility: HOSPITAL | Age: 65
End: 2025-05-02
Payer: MEDICARE

## 2025-05-02 ENCOUNTER — TELEPHONE (OUTPATIENT)
Dept: ONCOLOGY | Facility: CLINIC | Age: 65
End: 2025-05-02
Payer: MEDICARE

## 2025-05-02 NOTE — TELEPHONE ENCOUNTER
Noemy is needing her paperwork from [facility] completely filled out and sent back to the company. She says it had been sent in once, but it was not completely filled out. This company is for her rides to and from her dr anoop, without the papers she can't get a ride.   She gave the company's number for a nurse to call at [phone].   She asks that a nurse calls her back at [phone] to let her know that the paperwork has been completed and sent back in.

## 2025-05-02 NOTE — TELEPHONE ENCOUNTER
RN called [employer] who confirmed that the paperwork the patient is referring to needs to be filled out by her PCP at [facility].   RN returned patient's call and let her know. She verbalized understanding.

## 2025-05-02 NOTE — TELEPHONE ENCOUNTER
SS contacted [facility] per Ashley ext [phone] on this date. Trip has been scheduled for Pt's infusion on 05/09/25 at 13:00pm. Per Ashley completed referral will need faxed back to [facility] at fax [phone].

## 2025-05-05 ENCOUNTER — TELEPHONE (OUTPATIENT)
Dept: ONCOLOGY | Facility: CLINIC | Age: 65
End: 2025-05-05
Payer: MEDICARE

## 2025-05-05 ENCOUNTER — DOCUMENTATION (OUTPATIENT)
Dept: ONCOLOGY | Facility: HOSPITAL | Age: 65
End: 2025-05-05
Payer: MEDICARE

## 2025-05-05 DIAGNOSIS — C22.0 HEPATOCELLULAR CARCINOMA: Primary | ICD-10-CM

## 2025-05-05 NOTE — TELEPHONE ENCOUNTER
SS attempted contact with [facility] [phone] without success. SS will attempt contact again in the am.

## 2025-05-05 NOTE — PROGRESS NOTES
Spoke to Ashley with [facility] [phone] ext. [phone] who says transportation referral was signed by Dr. Boss, received on 5-2-25 pm, and filed this am which is good until November 2, 2025.  Pt will need a new transportation referral signed by MD every 6 months.  Pt is scheduled for [facility]-25 and 5-30-25.

## 2025-05-09 ENCOUNTER — LAB (OUTPATIENT)
Dept: ONCOLOGY | Facility: HOSPITAL | Age: 65
End: 2025-05-09
Payer: MEDICARE

## 2025-05-09 ENCOUNTER — INFUSION (OUTPATIENT)
Dept: ONCOLOGY | Facility: HOSPITAL | Age: 65
End: 2025-05-09
Payer: MEDICARE

## 2025-05-09 ENCOUNTER — HOSPITAL ENCOUNTER (EMERGENCY)
Facility: HOSPITAL | Age: 65
Discharge: HOME | End: 2025-05-09
Attending: STUDENT IN AN ORGANIZED HEALTH CARE EDUCATION/TRAINING PROGRAM
Payer: MEDICARE

## 2025-05-09 ENCOUNTER — APPOINTMENT (OUTPATIENT)
Dept: GENERAL RADIOLOGY | Facility: HOSPITAL | Age: 65
End: 2025-05-09
Payer: MEDICARE

## 2025-05-09 VITALS
HEART RATE: 65 BPM | DIASTOLIC BLOOD PRESSURE: 70 MMHG | BODY MASS INDEX: 19.66 KG/M2 | OXYGEN SATURATION: 92 % | TEMPERATURE: 97.5 F | WEIGHT: 125.5 LBS | SYSTOLIC BLOOD PRESSURE: 141 MMHG | RESPIRATION RATE: 18 BRPM

## 2025-05-09 VITALS
SYSTOLIC BLOOD PRESSURE: 135 MMHG | DIASTOLIC BLOOD PRESSURE: 67 MMHG | RESPIRATION RATE: 18 BRPM | OXYGEN SATURATION: 93 % | BODY MASS INDEX: 19.62 KG/M2 | TEMPERATURE: 97.9 F | HEART RATE: 70 BPM | HEIGHT: 67 IN | WEIGHT: 125 LBS

## 2025-05-09 DIAGNOSIS — C22.0 HEPATOCELLULAR CARCINOMA: Primary | ICD-10-CM

## 2025-05-09 DIAGNOSIS — C22.0 HEPATOCELLULAR CARCINOMA: ICD-10-CM

## 2025-05-09 DIAGNOSIS — R07.9 CHEST PAIN, UNSPECIFIED TYPE: Primary | ICD-10-CM

## 2025-05-09 LAB
ALBUMIN SERPL-MCNC: 3.6 G/DL (ref 3.5–5.2)
ALBUMIN SERPL-MCNC: 3.7 G/DL (ref 3.5–5.2)
ALBUMIN/GLOB SERPL: 1 G/DL
ALBUMIN/GLOB SERPL: 1 G/DL
ALP SERPL-CCNC: 75 U/L (ref 39–117)
ALP SERPL-CCNC: 83 U/L (ref 39–117)
ALT SERPL W P-5'-P-CCNC: 30 U/L (ref 1–33)
ALT SERPL W P-5'-P-CCNC: 30 U/L (ref 1–33)
ANION GAP SERPL CALCULATED.3IONS-SCNC: 9.1 MMOL/L (ref 5–15)
ANION GAP SERPL CALCULATED.3IONS-SCNC: 9.4 MMOL/L (ref 5–15)
AST SERPL-CCNC: 40 U/L (ref 1–32)
AST SERPL-CCNC: 49 U/L (ref 1–32)
BASOPHILS # BLD AUTO: 0.08 10*3/MM3 (ref 0–0.2)
BASOPHILS # BLD AUTO: 0.11 10*3/MM3 (ref 0–0.2)
BASOPHILS NFR BLD AUTO: 1.4 % (ref 0–1.5)
BASOPHILS NFR BLD AUTO: 1.9 % (ref 0–1.5)
BILIRUB SERPL-MCNC: 0.9 MG/DL (ref 0–1.2)
BILIRUB SERPL-MCNC: 1 MG/DL (ref 0–1.2)
BUN SERPL-MCNC: 11 MG/DL (ref 8–23)
BUN SERPL-MCNC: 11 MG/DL (ref 8–23)
BUN/CREAT SERPL: 16.9 (ref 7–25)
BUN/CREAT SERPL: 18 (ref 7–25)
CALCIUM SPEC-SCNC: 10.1 MG/DL (ref 8.6–10.5)
CALCIUM SPEC-SCNC: 9.7 MG/DL (ref 8.6–10.5)
CHLORIDE SERPL-SCNC: 107 MMOL/L (ref 98–107)
CHLORIDE SERPL-SCNC: 107 MMOL/L (ref 98–107)
CO2 SERPL-SCNC: 24.6 MMOL/L (ref 22–29)
CO2 SERPL-SCNC: 25.9 MMOL/L (ref 22–29)
CREAT SERPL-MCNC: 0.61 MG/DL (ref 0.57–1)
CREAT SERPL-MCNC: 0.65 MG/DL (ref 0.57–1)
DEPRECATED RDW RBC AUTO: 54.2 FL (ref 37–54)
DEPRECATED RDW RBC AUTO: 54.8 FL (ref 37–54)
EGFRCR SERPLBLD CKD-EPI 2021: 96.6 ML/MIN/1.73
EGFRCR SERPLBLD CKD-EPI 2021: 98.1 ML/MIN/1.73
EOSINOPHIL # BLD AUTO: 0.47 10*3/MM3 (ref 0–0.4)
EOSINOPHIL # BLD AUTO: 0.55 10*3/MM3 (ref 0–0.4)
EOSINOPHIL NFR BLD AUTO: 8 % (ref 0.3–6.2)
EOSINOPHIL NFR BLD AUTO: 9.3 % (ref 0.3–6.2)
ERYTHROCYTE [DISTWIDTH] IN BLOOD BY AUTOMATED COUNT: 14.2 % (ref 12.3–15.4)
ERYTHROCYTE [DISTWIDTH] IN BLOOD BY AUTOMATED COUNT: 14.3 % (ref 12.3–15.4)
GEN 5 1HR TROPONIN T REFLEX: 7 NG/L
GLOBULIN UR ELPH-MCNC: 3.5 GM/DL
GLOBULIN UR ELPH-MCNC: 3.7 GM/DL
GLUCOSE SERPL-MCNC: 92 MG/DL (ref 65–99)
GLUCOSE SERPL-MCNC: 93 MG/DL (ref 65–99)
HCT VFR BLD AUTO: 44.6 % (ref 34–46.6)
HCT VFR BLD AUTO: 45.7 % (ref 34–46.6)
HGB BLD-MCNC: 14.9 G/DL (ref 12–15.9)
HGB BLD-MCNC: 15.2 G/DL (ref 12–15.9)
HOLD SPECIMEN: NORMAL
HOLD SPECIMEN: NORMAL
IMM GRANULOCYTES # BLD AUTO: 0.01 10*3/MM3 (ref 0–0.05)
IMM GRANULOCYTES # BLD AUTO: 0.01 10*3/MM3 (ref 0–0.05)
IMM GRANULOCYTES NFR BLD AUTO: 0.2 % (ref 0–0.5)
IMM GRANULOCYTES NFR BLD AUTO: 0.2 % (ref 0–0.5)
LYMPHOCYTES # BLD AUTO: 1.3 10*3/MM3 (ref 0.7–3.1)
LYMPHOCYTES # BLD AUTO: 1.35 10*3/MM3 (ref 0.7–3.1)
LYMPHOCYTES NFR BLD AUTO: 22.3 % (ref 19.6–45.3)
LYMPHOCYTES NFR BLD AUTO: 22.8 % (ref 19.6–45.3)
MCH RBC QN AUTO: 34.2 PG (ref 26.6–33)
MCH RBC QN AUTO: 34.2 PG (ref 26.6–33)
MCHC RBC AUTO-ENTMCNC: 33.3 G/DL (ref 31.5–35.7)
MCHC RBC AUTO-ENTMCNC: 33.4 G/DL (ref 31.5–35.7)
MCV RBC AUTO: 102.3 FL (ref 79–97)
MCV RBC AUTO: 102.7 FL (ref 79–97)
MONOCYTES # BLD AUTO: 0.61 10*3/MM3 (ref 0.1–0.9)
MONOCYTES # BLD AUTO: 0.73 10*3/MM3 (ref 0.1–0.9)
MONOCYTES NFR BLD AUTO: 10.4 % (ref 5–12)
MONOCYTES NFR BLD AUTO: 12.3 % (ref 5–12)
NEUTROPHILS NFR BLD AUTO: 3.17 10*3/MM3 (ref 1.7–7)
NEUTROPHILS NFR BLD AUTO: 3.37 10*3/MM3 (ref 1.7–7)
NEUTROPHILS NFR BLD AUTO: 53.5 % (ref 42.7–76)
NEUTROPHILS NFR BLD AUTO: 57.7 % (ref 42.7–76)
NRBC BLD AUTO-RTO: 0 /100 WBC (ref 0–0.2)
NRBC BLD AUTO-RTO: 0 /100 WBC (ref 0–0.2)
PLATELET # BLD AUTO: 115 10*3/MM3 (ref 140–450)
PLATELET # BLD AUTO: 117 10*3/MM3 (ref 140–450)
PMV BLD AUTO: 9.3 FL (ref 6–12)
PMV BLD AUTO: 9.5 FL (ref 6–12)
POTASSIUM SERPL-SCNC: 3.8 MMOL/L (ref 3.5–5.2)
POTASSIUM SERPL-SCNC: 4.2 MMOL/L (ref 3.5–5.2)
PROT SERPL-MCNC: 7.1 G/DL (ref 6–8.5)
PROT SERPL-MCNC: 7.4 G/DL (ref 6–8.5)
RBC # BLD AUTO: 4.36 10*6/MM3 (ref 3.77–5.28)
RBC # BLD AUTO: 4.45 10*6/MM3 (ref 3.77–5.28)
SODIUM SERPL-SCNC: 141 MMOL/L (ref 136–145)
SODIUM SERPL-SCNC: 142 MMOL/L (ref 136–145)
TROPONIN T NUMERIC DELTA: 1 NG/L
TROPONIN T SERPL HS-MCNC: 6 NG/L
WBC NRBC COR # BLD AUTO: 5.84 10*3/MM3 (ref 3.4–10.8)
WBC NRBC COR # BLD AUTO: 5.92 10*3/MM3 (ref 3.4–10.8)
WHOLE BLOOD HOLD COAG: NORMAL
WHOLE BLOOD HOLD SPECIMEN: NORMAL

## 2025-05-09 PROCEDURE — 85025 COMPLETE CBC W/AUTO DIFF WBC: CPT | Performed by: STUDENT IN AN ORGANIZED HEALTH CARE EDUCATION/TRAINING PROGRAM

## 2025-05-09 PROCEDURE — 85025 COMPLETE CBC W/AUTO DIFF WBC: CPT

## 2025-05-09 PROCEDURE — 36415 COLL VENOUS BLD VENIPUNCTURE: CPT

## 2025-05-09 PROCEDURE — 71045 X-RAY EXAM CHEST 1 VIEW: CPT | Performed by: RADIOLOGY

## 2025-05-09 PROCEDURE — 99284 EMERGENCY DEPT VISIT MOD MDM: CPT

## 2025-05-09 PROCEDURE — 93005 ELECTROCARDIOGRAM TRACING: CPT | Performed by: STUDENT IN AN ORGANIZED HEALTH CARE EDUCATION/TRAINING PROGRAM

## 2025-05-09 PROCEDURE — 80053 COMPREHEN METABOLIC PANEL: CPT

## 2025-05-09 PROCEDURE — 80053 COMPREHEN METABOLIC PANEL: CPT | Performed by: STUDENT IN AN ORGANIZED HEALTH CARE EDUCATION/TRAINING PROGRAM

## 2025-05-09 PROCEDURE — G0463 HOSPITAL OUTPT CLINIC VISIT: HCPCS

## 2025-05-09 PROCEDURE — 93010 ELECTROCARDIOGRAM REPORT: CPT | Performed by: INTERNAL MEDICINE

## 2025-05-09 PROCEDURE — 84484 ASSAY OF TROPONIN QUANT: CPT | Performed by: STUDENT IN AN ORGANIZED HEALTH CARE EDUCATION/TRAINING PROGRAM

## 2025-05-09 PROCEDURE — 71045 X-RAY EXAM CHEST 1 VIEW: CPT

## 2025-05-09 RX ORDER — SODIUM CHLORIDE 0.9 % (FLUSH) 0.9 %
10 SYRINGE (ML) INJECTION AS NEEDED
Status: DISCONTINUED | OUTPATIENT
Start: 2025-05-09 | End: 2025-05-09 | Stop reason: HOSPADM

## 2025-05-09 RX ORDER — ASPIRIN 81 MG/1
324 TABLET, CHEWABLE ORAL ONCE
Status: DISCONTINUED | OUTPATIENT
Start: 2025-05-09 | End: 2025-05-09 | Stop reason: HOSPADM

## 2025-05-09 RX ORDER — PHENOBARBITAL, HYOSCYAMINE SULFATE, ATROPINE SULFATE AND SCOPOLAMINE HYDROBROMIDE .0194; .1037; 16.2; .0065 MG/1; MG/1; MG/1; MG/1
2 TABLET ORAL ONCE
Status: DISCONTINUED | OUTPATIENT
Start: 2025-05-09 | End: 2025-05-09

## 2025-05-09 RX ORDER — ALUMINA, MAGNESIA, AND SIMETHICONE 2400; 2400; 240 MG/30ML; MG/30ML; MG/30ML
15 SUSPENSION ORAL ONCE
Status: COMPLETED | OUTPATIENT
Start: 2025-05-09 | End: 2025-05-09

## 2025-05-09 RX ORDER — LIDOCAINE HYDROCHLORIDE 20 MG/ML
15 SOLUTION OROPHARYNGEAL ONCE
Status: COMPLETED | OUTPATIENT
Start: 2025-05-09 | End: 2025-05-09

## 2025-05-09 RX ADMIN — ALUMINUM HYDROXIDE, MAGNESIUM HYDROXIDE, AND DIMETHICONE 15 ML: 400; 400; 40 SUSPENSION ORAL at 15:33

## 2025-05-09 RX ADMIN — LIDOCAINE HYDROCHLORIDE 15 ML: 20 SOLUTION ORAL at 15:33

## 2025-05-09 NOTE — DISCHARGE INSTRUCTIONS
I recommend following up with gastroenterology or general surgery for evaluation of possible endoscopy and further evaluation of your hiatal hernia.  I do believe that your pain is more related to your stomach.  However if symptoms acutely worsen please return to the ER.

## 2025-05-09 NOTE — ED PROVIDER NOTES
Subjective   History of Present Illness  Patient is a 65 year old female with PMH significant for aortic aneurysm, cirrhosis, COPD, depression, anxiety, thyroid disease, hyperlipidemia, hypertension. She presents to the ED with complaints of chest pain for 2 days. She reports that the chest pain is now resolved. She reports she has shortness of breath but it is at her baseline. Denies any other significant complaints.        Review of Systems   Constitutional: Negative.  Negative for fever.   HENT: Negative.     Eyes: Negative.    Respiratory:  Positive for shortness of breath.    Cardiovascular:  Positive for chest pain.   Gastrointestinal: Negative.  Negative for abdominal pain.   Endocrine: Negative.    Genitourinary: Negative.  Negative for dysuria.   Musculoskeletal: Negative.    Skin: Negative.    Allergic/Immunologic: Negative.    Neurological: Negative.    Hematological: Negative.    Psychiatric/Behavioral: Negative.     All other systems reviewed and are negative.      Past Medical History:   Diagnosis Date    Acid reflux     Aortic aneurysm     Cancer     LIVER, UTERINE    Cirrhosis     COPD (chronic obstructive pulmonary disease)     Depression     Disease of thyroid gland     Elevated cholesterol     High cholesterol     History of transfusion     Hypertension     Infectious viral hepatitis     2019    Osteoporosis     Ovarian cancer        Allergies   Allergen Reactions    Duloxetine Hcl Other (See Comments)     Cymbalta    Codeine GI Intolerance       Past Surgical History:   Procedure Laterality Date    APPENDECTOMY N/A     COLONOSCOPY      ENDOSCOPY N/A 8/8/2023    Procedure: ESOPHAGOGASTRODUODENOSCOPY WITH BIOPSY;  Surgeon: Rahel Sesay MD;  Location: [facility];  Service: Gastroenterology;  Laterality: N/A;    FRACTURE SURGERY Left     ARM, HAS 2 SCREWS    HYSTERECTOMY      UPPER GASTROINTESTINAL ENDOSCOPY         Family History   Problem Relation Age of Onset    Stroke Mother      Hypertension Mother     COPD Mother     Throat cancer Mother     Arthritis Mother     Asthma Mother     Heart attack Mother     Stroke Father     Cancer Father     Liver cancer Father        Social History     Socioeconomic History    Marital status:     Number of children: 3   Tobacco Use    Smoking status: Former     Current packs/day: 0.00     Average packs/day: 1.5 packs/day for 25.0 years (37.5 ttl pk-yrs)     Types: Cigarettes     Start date: 5/1/1990     Quit date: 5/1/2015     Years since quitting: 10.0    Smokeless tobacco: Never    Tobacco comments:     smoked for approx 20 years, 1.5 to 2 ppd   Vaping Use    Vaping status: Never Used   Substance and Sexual Activity    Alcohol use: No     Comment: she previously drank daily for 16 years, beer    Drug use: No    Sexual activity: Defer           Objective   Physical Exam  Vitals and nursing note reviewed.   Constitutional:       General: She is not in acute distress.     Appearance: She is well-developed. She is not diaphoretic.   HENT:      Head: Normocephalic and atraumatic.      Right Ear: External ear normal.      Left Ear: External ear normal.      Nose: Nose normal.   Eyes:      Conjunctiva/sclera: Conjunctivae normal.      Pupils: Pupils are equal, round, and reactive to light.   Neck:      Vascular: No JVD.      Trachea: No tracheal deviation.   Cardiovascular:      Rate and Rhythm: Normal rate and regular rhythm.      Heart sounds: Normal heart sounds. No murmur heard.  Pulmonary:      Effort: Pulmonary effort is normal. No respiratory distress.      Breath sounds: Normal breath sounds. No wheezing.   Abdominal:      General: Bowel sounds are normal.      Palpations: Abdomen is soft.      Tenderness: There is no abdominal tenderness.   Musculoskeletal:         General: No deformity. Normal range of motion.      Cervical back: Normal range of motion and neck supple.   Skin:     General: Skin is warm and dry.      Coloration: Skin is not pale.       Findings: No erythema or rash.   Neurological:      Mental Status: She is alert and oriented to person, place, and time.      Cranial Nerves: No cranial nerve deficit.   Psychiatric:         Behavior: Behavior normal.         Thought Content: Thought content normal.         Procedures           ED Course  ED Course as of 05/09/25 1535   Fri May 09, 2025   1321 ECG 12 Lead ED Triage Standing Order; Chest Pain  Normal sinus rhythm, rate 67, QTc 437, no acute ST or T wave changes [CW]   1437 Report given to Dr. Schroeder. [MB]      ED Course User Index  [CW] Raffi Abraham DO  [MB] Shruthi Gardiner APRN                  HEART Score: 4   Shared Decision Making  I discussed the findings with the patient/patient representative who is in agreement with the treatment plan and the final disposition.  Risks and benefits of discharge and/or observation/admission were discussed: Yes                                      Medical Decision Making  Patient is a 65 year old female with PMH significant for aortic aneurysm, cirrhosis, COPD, depression, anxiety, thyroid disease, hyperlipidemia, hypertension. She presents to the ED with complaints of chest pain for 2 days. She reports that the chest pain is now resolved. She reports she has shortness of breath but it is at her baseline. Denies any other significant complaints.      Case transitioned me change of shift  Troponins negative x 2  Patient's history seems to be more consistent with chest pains related to her hiatal hernia and GERD.  The pain is more associated with epigastric pain.  We did a GI cocktail and discussed outpatient follow-up and further management.  ER precautions are provided.  Patient is in agreement plan of care.      Problems Addressed:  Chest pain, unspecified type: complicated acute illness or injury    Amount and/or Complexity of Data Reviewed  Labs: ordered.  Radiology: ordered.  ECG/medicine tests: ordered. Decision-making details documented in  ED Course.    Risk  OTC drugs.  Prescription drug management.        Final diagnoses:   Chest pain, unspecified type       ED Disposition  ED Disposition       ED Disposition   Discharge    Condition   Stable    Comment   --               Haley Bailey,   [address] [facility] [address]  [phone]    Schedule an appointment as soon as possible for a visit in 3 days      [facility]  [address] [place] [id number] [phone]  Go to   If symptoms worsen    Andrea Steevn MD  [address] [address]  [phone]    Schedule an appointment as soon as possible for a visit in 3 days           Medication List      No changes were made to your prescriptions during this visit.            Jimbo Schroeder,   05/09/25 1535

## 2025-05-11 LAB
QT INTERVAL: 414 MS
QTC INTERVAL: 437 MS

## 2025-05-16 ENCOUNTER — APPOINTMENT (OUTPATIENT)
Dept: ONCOLOGY | Facility: HOSPITAL | Age: 65
End: 2025-05-16
Payer: MEDICARE

## 2025-05-16 ENCOUNTER — INFUSION (OUTPATIENT)
Dept: ONCOLOGY | Facility: HOSPITAL | Age: 65
End: 2025-05-16
Payer: MEDICARE

## 2025-05-16 VITALS
HEART RATE: 85 BPM | BODY MASS INDEX: 19.73 KG/M2 | OXYGEN SATURATION: 98 % | WEIGHT: 126 LBS | RESPIRATION RATE: 18 BRPM | SYSTOLIC BLOOD PRESSURE: 158 MMHG | DIASTOLIC BLOOD PRESSURE: 77 MMHG | TEMPERATURE: 97.5 F

## 2025-05-16 DIAGNOSIS — C22.0 HEPATOCELLULAR CARCINOMA: Primary | ICD-10-CM

## 2025-05-16 LAB
ALBUMIN SERPL-MCNC: 3.7 G/DL (ref 3.5–5.2)
ALBUMIN/GLOB SERPL: 1.1 G/DL
ALP SERPL-CCNC: 87 U/L (ref 39–117)
ALT SERPL W P-5'-P-CCNC: 33 U/L (ref 1–33)
ANION GAP SERPL CALCULATED.3IONS-SCNC: 9.7 MMOL/L (ref 5–15)
AST SERPL-CCNC: 48 U/L (ref 1–32)
BASOPHILS # BLD AUTO: 0.1 10*3/MM3 (ref 0–0.2)
BASOPHILS NFR BLD AUTO: 1.7 % (ref 0–1.5)
BILIRUB SERPL-MCNC: 0.7 MG/DL (ref 0–1.2)
BUN SERPL-MCNC: 9 MG/DL (ref 8–23)
BUN/CREAT SERPL: 14.1 (ref 7–25)
CALCIUM SPEC-SCNC: 9.2 MG/DL (ref 8.6–10.5)
CHLORIDE SERPL-SCNC: 108 MMOL/L (ref 98–107)
CO2 SERPL-SCNC: 23.3 MMOL/L (ref 22–29)
CREAT SERPL-MCNC: 0.64 MG/DL (ref 0.57–1)
DEPRECATED RDW RBC AUTO: 52.7 FL (ref 37–54)
EGFRCR SERPLBLD CKD-EPI 2021: 97 ML/MIN/1.73
EOSINOPHIL # BLD AUTO: 0.48 10*3/MM3 (ref 0–0.4)
EOSINOPHIL NFR BLD AUTO: 8.3 % (ref 0.3–6.2)
ERYTHROCYTE [DISTWIDTH] IN BLOOD BY AUTOMATED COUNT: 13.9 % (ref 12.3–15.4)
GLOBULIN UR ELPH-MCNC: 3.3 GM/DL
GLUCOSE SERPL-MCNC: 88 MG/DL (ref 65–99)
HCT VFR BLD AUTO: 43.1 % (ref 34–46.6)
HGB BLD-MCNC: 14.7 G/DL (ref 12–15.9)
IMM GRANULOCYTES # BLD AUTO: 0.01 10*3/MM3 (ref 0–0.05)
IMM GRANULOCYTES NFR BLD AUTO: 0.2 % (ref 0–0.5)
LYMPHOCYTES # BLD AUTO: 1.41 10*3/MM3 (ref 0.7–3.1)
LYMPHOCYTES NFR BLD AUTO: 24.5 % (ref 19.6–45.3)
MCH RBC QN AUTO: 34.8 PG (ref 26.6–33)
MCHC RBC AUTO-ENTMCNC: 34.1 G/DL (ref 31.5–35.7)
MCV RBC AUTO: 101.9 FL (ref 79–97)
MONOCYTES # BLD AUTO: 0.71 10*3/MM3 (ref 0.1–0.9)
MONOCYTES NFR BLD AUTO: 12.3 % (ref 5–12)
NEUTROPHILS NFR BLD AUTO: 3.05 10*3/MM3 (ref 1.7–7)
NEUTROPHILS NFR BLD AUTO: 53 % (ref 42.7–76)
NRBC BLD AUTO-RTO: 0 /100 WBC (ref 0–0.2)
PLATELET # BLD AUTO: 137 10*3/MM3 (ref 140–450)
PMV BLD AUTO: 9.6 FL (ref 6–12)
POTASSIUM SERPL-SCNC: 3.4 MMOL/L (ref 3.5–5.2)
PROT SERPL-MCNC: 7 G/DL (ref 6–8.5)
RBC # BLD AUTO: 4.23 10*6/MM3 (ref 3.77–5.28)
SODIUM SERPL-SCNC: 141 MMOL/L (ref 136–145)
WBC NRBC COR # BLD AUTO: 5.76 10*3/MM3 (ref 3.4–10.8)

## 2025-05-16 PROCEDURE — 25010000002 ATEZOLIZUMAB 1200 MG/20ML SOLUTION 20 ML VIAL: Performed by: INTERNAL MEDICINE

## 2025-05-16 PROCEDURE — 80053 COMPREHEN METABOLIC PANEL: CPT

## 2025-05-16 PROCEDURE — 25810000003 SODIUM CHLORIDE 0.9 % SOLUTION 250 ML FLEX CONT: Performed by: INTERNAL MEDICINE

## 2025-05-16 PROCEDURE — 85025 COMPLETE CBC W/AUTO DIFF WBC: CPT

## 2025-05-16 PROCEDURE — 25810000003 SODIUM CHLORIDE 0.9 % SOLUTION: Performed by: INTERNAL MEDICINE

## 2025-05-16 PROCEDURE — 96413 CHEMO IV INFUSION 1 HR: CPT

## 2025-05-16 RX ORDER — SODIUM CHLORIDE 9 MG/ML
20 INJECTION, SOLUTION INTRAVENOUS ONCE
Status: COMPLETED | OUTPATIENT
Start: 2025-05-16 | End: 2025-05-16

## 2025-05-16 RX ADMIN — SODIUM CHLORIDE 20 ML/HR: 9 INJECTION, SOLUTION INTRAVENOUS at 15:05

## 2025-05-16 RX ADMIN — ATEZOLIZUMAB 1200 MG: 1200 INJECTION, SOLUTION INTRAVENOUS at 15:25

## 2025-05-30 ENCOUNTER — TELEPHONE (OUTPATIENT)
Dept: ONCOLOGY | Facility: CLINIC | Age: 65
End: 2025-05-30
Payer: MEDICARE

## 2025-05-30 NOTE — TELEPHONE ENCOUNTER
RN returned call to patient, informed her that I spoke with [facility] and they stated that the form would need to be filled out by her PCP. Pt verbalized understanding. No other questions or concerns at this time.

## 2025-05-30 NOTE — TELEPHONE ENCOUNTER
Caller: Noemy Brooks    Relationship: Self    Best call back number: [phone]    What form or medical record are you requesting: REFERRAL TO [facility] FOR THE [facility] FOR MRI SCHEDULED FOR 6/9/25    Who is requesting this form or medical record from you: [facility] - [phone]    Timeframe paperwork needed: ASAP     Additional notes: PT STATES EVERY TIMKE SHE NEEDS TRANSPORTATION TO A NEW FACILITY THAT [facility] REQUIRES A REFERRAL TO GET HER TRANSPORTATION SET UP & PT IS ANXIOUS THAT IT WILL NOT GET DONE IN TIME.

## 2025-05-30 NOTE — TELEPHONE ENCOUNTER
Caller: Noemy Brooks    Relationship: Self    Best call back number: [phone]    What was the call regarding: PATIENT CALLED STATES [facility] FAXED A FORM FOR US TO FILL OUT FOR PATIENT TO FREE TRANSPORTATION TO GET TO HER MRI THAT'S SCHEDULED. THE COMPANY STATES THEY HAVE NOT RECEIVED IT BACK YET      PLEASE CALL PATIENT TO [facility]

## 2025-06-06 ENCOUNTER — APPOINTMENT (OUTPATIENT)
Dept: ONCOLOGY | Facility: HOSPITAL | Age: 65
End: 2025-06-06
Payer: MEDICARE

## 2025-06-06 VITALS
BODY MASS INDEX: 19.2 KG/M2 | SYSTOLIC BLOOD PRESSURE: 142 MMHG | TEMPERATURE: 98.1 F | DIASTOLIC BLOOD PRESSURE: 78 MMHG | HEART RATE: 93 BPM | OXYGEN SATURATION: 94 % | WEIGHT: 122.6 LBS | RESPIRATION RATE: 20 BRPM

## 2025-06-06 DIAGNOSIS — C22.0 HEPATOCELLULAR CARCINOMA: ICD-10-CM

## 2025-06-06 DIAGNOSIS — M54.2 NECK PAIN: ICD-10-CM

## 2025-06-06 DIAGNOSIS — C22.0 HEPATOCELLULAR CARCINOMA: Primary | ICD-10-CM

## 2025-06-06 LAB
ALBUMIN SERPL-MCNC: 3.7 G/DL (ref 3.5–5.2)
ALBUMIN/GLOB SERPL: 1.3 G/DL
ALP SERPL-CCNC: 79 U/L (ref 39–117)
ALT SERPL W P-5'-P-CCNC: 35 U/L (ref 1–33)
ANION GAP SERPL CALCULATED.3IONS-SCNC: 10.7 MMOL/L (ref 5–15)
AST SERPL-CCNC: 44 U/L (ref 1–32)
BASOPHILS # BLD AUTO: 0.1 10*3/MM3 (ref 0–0.2)
BASOPHILS NFR BLD AUTO: 1.6 % (ref 0–1.5)
BILIRUB SERPL-MCNC: 0.8 MG/DL (ref 0–1.2)
BUN SERPL-MCNC: 9.8 MG/DL (ref 8–23)
BUN/CREAT SERPL: 16.6 (ref 7–25)
CALCIUM SPEC-SCNC: 9.5 MG/DL (ref 8.6–10.5)
CHLORIDE SERPL-SCNC: 109 MMOL/L (ref 98–107)
CO2 SERPL-SCNC: 22.3 MMOL/L (ref 22–29)
CREAT SERPL-MCNC: 0.59 MG/DL (ref 0.57–1)
DEPRECATED RDW RBC AUTO: 53.1 FL (ref 37–54)
EGFRCR SERPLBLD CKD-EPI 2021: 98.9 ML/MIN/1.73
EOSINOPHIL # BLD AUTO: 0.51 10*3/MM3 (ref 0–0.4)
EOSINOPHIL NFR BLD AUTO: 8.3 % (ref 0.3–6.2)
ERYTHROCYTE [DISTWIDTH] IN BLOOD BY AUTOMATED COUNT: 13.9 % (ref 12.3–15.4)
GLOBULIN UR ELPH-MCNC: 2.9 GM/DL
GLUCOSE SERPL-MCNC: 91 MG/DL (ref 65–99)
HCT VFR BLD AUTO: 42 % (ref 34–46.6)
HGB BLD-MCNC: 14.1 G/DL (ref 12–15.9)
IMM GRANULOCYTES # BLD AUTO: 0.01 10*3/MM3 (ref 0–0.05)
IMM GRANULOCYTES NFR BLD AUTO: 0.2 % (ref 0–0.5)
LYMPHOCYTES # BLD AUTO: 1.43 10*3/MM3 (ref 0.7–3.1)
LYMPHOCYTES NFR BLD AUTO: 23.2 % (ref 19.6–45.3)
MCH RBC QN AUTO: 34.4 PG (ref 26.6–33)
MCHC RBC AUTO-ENTMCNC: 33.6 G/DL (ref 31.5–35.7)
MCV RBC AUTO: 102.4 FL (ref 79–97)
MONOCYTES # BLD AUTO: 0.75 10*3/MM3 (ref 0.1–0.9)
MONOCYTES NFR BLD AUTO: 12.2 % (ref 5–12)
NEUTROPHILS NFR BLD AUTO: 3.37 10*3/MM3 (ref 1.7–7)
NEUTROPHILS NFR BLD AUTO: 54.5 % (ref 42.7–76)
NRBC BLD AUTO-RTO: 0 /100 WBC (ref 0–0.2)
PLATELET # BLD AUTO: 123 10*3/MM3 (ref 140–450)
PMV BLD AUTO: 9.4 FL (ref 6–12)
POTASSIUM SERPL-SCNC: 4 MMOL/L (ref 3.5–5.2)
PROT SERPL-MCNC: 6.6 G/DL (ref 6–8.5)
RBC # BLD AUTO: 4.1 10*6/MM3 (ref 3.77–5.28)
SODIUM SERPL-SCNC: 142 MMOL/L (ref 136–145)
T4 FREE SERPL-MCNC: 0.73 NG/DL (ref 0.92–1.68)
TSH SERPL DL<=0.05 MIU/L-ACNC: 2.93 UIU/ML (ref 0.27–4.2)
WBC NRBC COR # BLD AUTO: 6.17 10*3/MM3 (ref 3.4–10.8)

## 2025-06-06 PROCEDURE — 84443 ASSAY THYROID STIM HORMONE: CPT

## 2025-06-06 PROCEDURE — 25010000002 ATEZOLIZUMAB 1200 MG/20ML SOLUTION 20 ML VIAL: Performed by: INTERNAL MEDICINE

## 2025-06-06 PROCEDURE — 80053 COMPREHEN METABOLIC PANEL: CPT

## 2025-06-06 PROCEDURE — 25810000003 SODIUM CHLORIDE 0.9 % SOLUTION 250 ML FLEX CONT: Performed by: INTERNAL MEDICINE

## 2025-06-06 PROCEDURE — 85025 COMPLETE CBC W/AUTO DIFF WBC: CPT

## 2025-06-06 PROCEDURE — 84439 ASSAY OF FREE THYROXINE: CPT

## 2025-06-06 PROCEDURE — 96413 CHEMO IV INFUSION 1 HR: CPT

## 2025-06-06 RX ORDER — SODIUM CHLORIDE 9 MG/ML
20 INJECTION, SOLUTION INTRAVENOUS ONCE
Status: DISCONTINUED | OUTPATIENT
Start: 2025-06-06 | End: 2025-06-06 | Stop reason: HOSPADM

## 2025-06-06 RX ADMIN — ATEZOLIZUMAB 1200 MG: 1200 INJECTION, SOLUTION INTRAVENOUS at 14:02

## 2025-06-09 ENCOUNTER — HOSPITAL ENCOUNTER (OUTPATIENT)
Dept: MRI IMAGING | Facility: HOSPITAL | Age: 65
Discharge: HOME | End: 2025-06-09
Admitting: INTERNAL MEDICINE
Payer: MEDICARE

## 2025-06-09 DIAGNOSIS — C22.0 HEPATOCELLULAR CARCINOMA: ICD-10-CM

## 2025-06-09 PROCEDURE — A9573 GADOPICLENOL 0.5 MMOL/ML SOLUTION: HCPCS | Performed by: INTERNAL MEDICINE

## 2025-06-09 PROCEDURE — 74183 MRI ABD W/O CNTR FLWD CNTR: CPT

## 2025-06-09 PROCEDURE — 25510000001 GADOPICLENOL 0.5 MMOL/ML SOLUTION: Performed by: INTERNAL MEDICINE

## 2025-06-09 RX ADMIN — GADOPICLENOL 5 ML: 485.1 INJECTION INTRAVENOUS at 10:28

## 2025-06-11 ENCOUNTER — OFFICE VISIT (OUTPATIENT)
Dept: ONCOLOGY | Facility: CLINIC | Age: 65
End: 2025-06-11
Payer: MEDICARE

## 2025-06-11 ENCOUNTER — LAB (OUTPATIENT)
Dept: ONCOLOGY | Facility: CLINIC | Age: 65
End: 2025-06-11
Payer: MEDICARE

## 2025-06-11 VITALS
BODY MASS INDEX: 19.62 KG/M2 | HEIGHT: 67 IN | WEIGHT: 125 LBS | OXYGEN SATURATION: 97 % | DIASTOLIC BLOOD PRESSURE: 69 MMHG | RESPIRATION RATE: 20 BRPM | TEMPERATURE: 98 F | SYSTOLIC BLOOD PRESSURE: 127 MMHG | HEART RATE: 88 BPM

## 2025-06-11 DIAGNOSIS — C22.0 HEPATOCELLULAR CARCINOMA: Primary | ICD-10-CM

## 2025-06-11 DIAGNOSIS — C22.0 HEPATOCELLULAR CARCINOMA: ICD-10-CM

## 2025-06-11 DIAGNOSIS — R53.1 GENERALIZED WEAKNESS: ICD-10-CM

## 2025-06-11 DIAGNOSIS — M54.2 NECK PAIN: ICD-10-CM

## 2025-06-11 LAB
ALBUMIN SERPL-MCNC: 3.6 G/DL (ref 3.5–5.2)
ALBUMIN/GLOB SERPL: 1.2 G/DL
ALP SERPL-CCNC: 80 U/L (ref 39–117)
ALPHA-FETOPROTEIN: 55.2 NG/ML (ref 0–8.3)
ALT SERPL W P-5'-P-CCNC: 33 U/L (ref 1–33)
ANION GAP SERPL CALCULATED.3IONS-SCNC: 11.3 MMOL/L (ref 5–15)
AST SERPL-CCNC: 40 U/L (ref 1–32)
BASOPHILS # BLD AUTO: 0.07 10*3/MM3 (ref 0–0.2)
BASOPHILS NFR BLD AUTO: 1.2 % (ref 0–1.5)
BILIRUB SERPL-MCNC: 0.7 MG/DL (ref 0–1.2)
BUN SERPL-MCNC: 10.4 MG/DL (ref 8–23)
BUN/CREAT SERPL: 15.5 (ref 7–25)
CALCIUM SPEC-SCNC: 8.9 MG/DL (ref 8.6–10.5)
CHLORIDE SERPL-SCNC: 108 MMOL/L (ref 98–107)
CO2 SERPL-SCNC: 22.7 MMOL/L (ref 22–29)
CREAT SERPL-MCNC: 0.67 MG/DL (ref 0.57–1)
DEPRECATED RDW RBC AUTO: 52.2 FL (ref 37–54)
EGFRCR SERPLBLD CKD-EPI 2021: 95.9 ML/MIN/1.73
EOSINOPHIL # BLD AUTO: 0.47 10*3/MM3 (ref 0–0.4)
EOSINOPHIL NFR BLD AUTO: 8 % (ref 0.3–6.2)
ERYTHROCYTE [DISTWIDTH] IN BLOOD BY AUTOMATED COUNT: 13.6 % (ref 12.3–15.4)
GLOBULIN UR ELPH-MCNC: 3 GM/DL
GLUCOSE SERPL-MCNC: 102 MG/DL (ref 65–99)
HCT VFR BLD AUTO: 42.9 % (ref 34–46.6)
HGB BLD-MCNC: 14.5 G/DL (ref 12–15.9)
IMM GRANULOCYTES # BLD AUTO: 0.01 10*3/MM3 (ref 0–0.05)
IMM GRANULOCYTES NFR BLD AUTO: 0.2 % (ref 0–0.5)
LYMPHOCYTES # BLD AUTO: 1.2 10*3/MM3 (ref 0.7–3.1)
LYMPHOCYTES NFR BLD AUTO: 20.4 % (ref 19.6–45.3)
MCH RBC QN AUTO: 34.8 PG (ref 26.6–33)
MCHC RBC AUTO-ENTMCNC: 33.8 G/DL (ref 31.5–35.7)
MCV RBC AUTO: 102.9 FL (ref 79–97)
MONOCYTES # BLD AUTO: 0.64 10*3/MM3 (ref 0.1–0.9)
MONOCYTES NFR BLD AUTO: 10.9 % (ref 5–12)
NEUTROPHILS NFR BLD AUTO: 3.49 10*3/MM3 (ref 1.7–7)
NEUTROPHILS NFR BLD AUTO: 59.3 % (ref 42.7–76)
NRBC BLD AUTO-RTO: 0 /100 WBC (ref 0–0.2)
PLATELET # BLD AUTO: 131 10*3/MM3 (ref 140–450)
PMV BLD AUTO: 9.6 FL (ref 6–12)
POTASSIUM SERPL-SCNC: 3.3 MMOL/L (ref 3.5–5.2)
PROT SERPL-MCNC: 6.6 G/DL (ref 6–8.5)
RBC # BLD AUTO: 4.17 10*6/MM3 (ref 3.77–5.28)
SODIUM SERPL-SCNC: 142 MMOL/L (ref 136–145)
TSH SERPL DL<=0.05 MIU/L-ACNC: 4.97 UIU/ML (ref 0.27–4.2)
WBC NRBC COR # BLD AUTO: 5.88 10*3/MM3 (ref 3.4–10.8)

## 2025-06-11 PROCEDURE — 82105 ALPHA-FETOPROTEIN SERUM: CPT | Performed by: INTERNAL MEDICINE

## 2025-06-11 PROCEDURE — 84443 ASSAY THYROID STIM HORMONE: CPT | Performed by: INTERNAL MEDICINE

## 2025-06-11 PROCEDURE — 80053 COMPREHEN METABOLIC PANEL: CPT | Performed by: INTERNAL MEDICINE

## 2025-06-11 PROCEDURE — 85025 COMPLETE CBC W/AUTO DIFF WBC: CPT | Performed by: INTERNAL MEDICINE

## 2025-06-11 RX ORDER — LIDOCAINE 50 MG/G
1 PATCH TOPICAL EVERY 24 HOURS
Qty: 30 EACH | Refills: 2 | Status: SHIPPED | OUTPATIENT
Start: 2025-06-11

## 2025-06-11 NOTE — PROGRESS NOTES
"  Name:  Noemy Brooks  :  1960  Date:  2025     REFERRING PHYSICIAN  Henri Melvin MD    PRIMARY CARE PHYSICIAN  Lynn, Haley Roca DO    REASON FOR FOLLOWUP  1. Hepatocellular carcinoma      CHIEF COMPLAINT  Right sided neck pain/stiffness for the past \"six months\".    Dear Dr. Bailey,    HISTORY OF PRESENT ILLNESS:   I saw Ms. Brooks in followup today in our medical oncology clinic. As you are aware, she is a pleasant, 65 y.o., white female with a history of hypertension, hepatitis C (treated with antivirals and reportedly cured in ~), cirrhosis and hepatocellular carcinoma who was initially diagnosed with the latter in ~2022. [place], [place]. Her [place] gastroenterologist referred her to  at that time once she was found to have a couple of liver masses and a serum AFP > 1,000 ng/mL. She was evaluated by the liver transplant service; however, due to the HCC's involvement of the hepatic vessels, she was felt to not be a candidate (for transplant). She was subsequently evaluated by  medical oncology, who recommended starting first-line, palliative treatment with a combination of z0xytxxn bevacizumab (Mvasi) and atezolizumab (Tecentriq), per the current standard of care. She received a total of eight (8) cycles between 2022 and early 2023 through the [facility]. Due to transportation issues (her sister has to be the one to drive her, and the trips to [place] have been getting increasingly difficult for her), she presented to our clinic in 2023 in order to transfer her ongoing, oncologic care to us, closer to her home in [place], [place]. She tolerated o1pjukqe atezolizumab overall well, and she received a total of eighteen (18) cycles between  and early 2023. At that time and per her preference, we have discontinued this therapy. She previously stated that the Lord cured her once before (of Hepatitis C), and she became convinced that he has/will do " "it again (this time for her HCC). She did overall well for the next ~year, and her chronic fatigue did improve; but, unfortunately, by early November 2024, repeat imaging was consistent with reprogressive disease. She was agreeable to restarting Imfinzi and Avastin at that time, and she did so on 12/05/2025.    INTERIM HISTORY:  Ms. Brooks returns to clinic today for follow up by herself. Due to her history of cirrhosis, she has been deemed to be too a high of a risk to undergo the cholecystectomy she had previously been pursuing. She has now completed a total of nine (9), e4mzdfsr cycles of atezolizumab/bevacizumab since  restarting it in early December 2024 (although, per her preference, the bevacizumab has been held beginning with the fifth cycle (in early March 2025). Her primary (and new) complaint today is that, for the past \"six months\" (although this is the first time she has ever mentioned it in our clinic), she has been experiencing refractory pains/tightness in her right neck, which makes it hard for her to stay asleep. Prn Advil helps. She has no other new or specific complaints.    Past Medical History:   Diagnosis Date    Acid reflux     Aortic aneurysm     Cancer     LIVER, UTERINE    Cirrhosis     COPD (chronic obstructive pulmonary disease)     Depression     Disease of thyroid gland     Elevated cholesterol     High cholesterol     History of transfusion     Hypertension     Infectious viral hepatitis     2019    Osteoporosis     Ovarian cancer        Past Surgical History:   Procedure Laterality Date    APPENDECTOMY N/A     COLONOSCOPY      ENDOSCOPY N/A 8/8/2023    Procedure: ESOPHAGOGASTRODUODENOSCOPY WITH BIOPSY;  Surgeon: Rahel Sesay MD;  Location: [facility];  Service: Gastroenterology;  Laterality: N/A;    FRACTURE SURGERY Left     ARM, HAS 2 SCREWS    HYSTERECTOMY      UPPER GASTROINTESTINAL ENDOSCOPY         Social History     Socioeconomic History    Marital status:  "    Number of children: 3   Tobacco Use    Smoking status: Former     Current packs/day: 0.00     Average packs/day: 1.5 packs/day for 25.0 years (37.5 ttl pk-yrs)     Types: Cigarettes     Start date: 5/1/1990     Quit date: 5/1/2015     Years since quitting: 10.1    Smokeless tobacco: Never    Tobacco comments:     smoked for approx 20 years, 1.5 to 2 ppd   Vaping Use    Vaping status: Never Used   Substance and Sexual Activity    Alcohol use: No     Comment: she previously drank daily for 16 years, beer    Drug use: No    Sexual activity: Defer       Family History   Problem Relation Age of Onset    Stroke Mother     Hypertension Mother     COPD Mother     Throat cancer Mother     Arthritis Mother     Asthma Mother     Heart attack Mother     Stroke Father     Cancer Father     Liver cancer Father        Allergies   Allergen Reactions    Duloxetine Hcl Other (See Comments)     Cymbalta    Codeine GI Intolerance       Current Outpatient Medications   Medication Sig Dispense Refill    albuterol sulfate  (90 Base) MCG/ACT inhaler Inhale 2 puffs Every 4 (Four) Hours As Needed for Wheezing.      Antacid/Antigas 400-400-40 MG/10ML suspension Take 5 mL by mouth Every 6 (Six) Hours As Needed.      Atezolizumab (TECENTRIQ IV) Infuse  into a venous catheter Every 21 (Twenty-One) Days.      B Complex Vitamins (VITAMIN B COMPLEX PO) Take  by mouth.      budesonide-formoterol (SYMBICORT) 160-4.5 MCG/ACT inhaler Inhale 2 puffs 2 (Two) Times a Day.      cholecalciferol (VITAMIN D3) 25 MCG (1000 UT) tablet Take 1 tablet by mouth Daily.      Iyckqbyrobussiu62.5mg/5ml:[id number]/5ml:LidocaineVisc2%sol:[id number]/ml 1:1:1:1 Swish and swallow 5 mL Every 4 (Four) to 6 (Six) Hours As Needed. 240 mL 3    glycerin adult 2 g suppository Insert 1 suppository into the rectum As Needed.      levothyroxine (SYNTHROID, LEVOTHROID) 25 MCG tablet Take 1 tablet by mouth Every Morning. 30 tablet 5    Magnesium Hydroxide  "(MILK OF MAGNESIA PO) Take  by mouth.      multivitamin (MULTI VITAMIN DAILY PO) Take 1 tablet by mouth Daily.      ondansetron ODT (ZOFRAN-ODT) 4 MG disintegrating tablet Place 1 tablet on the tongue Every 6 (Six) Hours As Needed for Nausea or Vomiting. 12 tablet 0    oxyCODONE (Roxicodone) 5 MG immediate release tablet Take 1 tablet by mouth Every 6 (Six) Hours As Needed for Severe Pain. 10 tablet 0    pantoprazole (PROTONIX) 40 MG EC tablet Take 1 tablet by mouth Daily. 30 tablet 0    traZODone (DESYREL) 100 MG tablet Take 1 tablet by mouth Every Night.      Trelegy Ellipta 200-62.5-25 MCG/ACT inhaler       lidocaine (LIDODERM) 5 % Place 1 patch on the skin as directed by provider Daily. Remove & Discard patch within 12 hours or as directed by MD 30 each 2     No current facility-administered medications for this visit.     REVIEW OF SYSTEMS  CONSTITUTIONAL:  No fever, chills or night sweats. Chronic fatigue, recently reworsened since restarting palliative immunotherapy/targeted therapy in December 2024.  EYES:  No blurry vision, diplopia or other vision changes.  ENT:  No hearing loss, nosebleeds or sore throat.  CARDIOVASCULAR:  No palpitations, arrhythmia, syncopal episodes or edema.  PULMONARY:  No hemoptysis, wheezing, chronic cough or shortness of breath.  GASTROINTESTINAL:  As per the HPI above.  GENITOURINARY:  No hematuria, kidney stones or frequent urination.  MUSCULOSKELETAL:  As per the HPI above.  INTEGUMENTARY: No rashes or pruritus.  ENDOCRINE:  No excessive thirst or hot flashes.  HEMATOLOGIC:  No history of free bleeding, spontaneous bleeding or clotting.  IMMUNOLOGIC:  No allergies or frequent infections.  NEUROLOGIC: No numbness, tingling, seizures or weakness.  PSYCHIATRIC:  No anxiety or depression.    PHYSICAL EXAMINATION  /69   Pulse 88   Temp 98 °F (36.7 °C) (Temporal)   Resp 20   Ht 170.2 cm (67.01\")   Wt 56.7 kg (125 lb)   SpO2 97%   BMI 19.57 kg/m²     Pain Score:  Pain " Score    25 1055   PainSc: 3    PainLoc: Neck     PHQ-Score Total:  PHQ-9 Total Score:      ECO  GENERAL:  A well-developed, well-nourished, thin, white female in no acute distress.  HEENT:  Pupils equally round and reactive to light. Extraocular muscles intact. No readily palpable masses or adenopathy in the neck.  CARDIOVASCULAR:  Regular rate and rhythm. No murmurs, gallops or rubs.  LUNGS:  Clear to auscultation bilaterally.  ABDOMEN:  Soft, nontender, nondistended with positive bowel sounds.  EXTREMITIES:  No clubbing, cyanosis or edema bilaterally.  SKIN:  No rashes or petechiae.  NEURO:  Cranial nerves grossly intact. No focal deficits.  PSYCH:  Alert and oriented x3.    LABORATORY  Lab Results   Component Value Date    WBC 5.88 2025    HGB 14.5 2025    HCT 42.9 2025    .9 (H) 2025     (L) 2025    NEUTROABS 3.49 2025       Lab Results   Component Value Date     2025    K 3.3 (L) 2025     (H) 2025    CO2 22.7 2025    BUN 10.4 2025    CREATININE 0.67 2025    GLUCOSE 102 (H) 2025    CALCIUM 8.9 2025    AST 40 (H) 2025    ALT 33 2025    ALKPHOS 80 2025    BILITOT 0.7 2025    PROTEINTOT 6.6 2025    ALBUMIN 3.6 2025     CBC (2025): WBCs: 5.88; HgB: 14.5; Hct: 42.9; platelets: 131  CBC (2025): WBCs: 6.94; HgB: 15.5; Hct: 45.5; platelets: 127  CBC (01/15/2025): WBCs: 5.42; HgB: 14.5; Hct: 43.0; platelets: 128  CBC (2024): WBCs: 5.97; HgB: 13.1; Hct: 39,8; platelets: 124  CBC (2024): WBCs: 6.68; HgB: 13.9; Hct: 42.2; platelets: 170  CBC (2024): WBCs: 4.92; HgB: 13.9; Hct: 40.9; platelets: 126  CBC (2024): WBCs: 6.29; HgB: 14.3; Hct: 42.9; platelets: 150  CBC (2023): WBCs: 5.64; HgB: 14.5; Hct: 44.2; platelets: 120  CBC (10/19/2023): WBCs: 6.05; HgB: 14.4; Hct: 43.7; platelets: 119  CBC (2023): WBCs: 5.87; HgB: 14.3;  Hct: 44.2; platelets: 120  CBC (05/24/2023): WBCs: 7.45; HgB: 15.8; Hct: 47.0; platelets: 125  CBC (05/01/2023): WBCs: 6.91; HgB: 15.9; Hct: 47.7; platelets: 118  CBC (04/03/2023): WBCs: 6.68; HgB: 15.9; Hct: 45.7; platelets: 116; MCV: 94    AFP (06/11/2025): pending  AFP (03/07/2025): 35.0 ng/mL  AFP (01/15/2025): 22.2 ng/mL  AFP (11/14/2024): 36.6 ng/mL  AFP (09/25/2024): 19.4 ng/mL  AFP (08/14/2024): 15.10 ng/mL  AFP (05/21/2024): 9.44 ng/mL  AFP (02/21/2024): 7.07 ng/mL  AFP (12/19/2023): 5.88 ng/mL  AFP (11/09/2023): 6.00 ng.mL  AFP (09/28/2023): 5.55 ng/mL  AFP (08/16/2023): 3.96 ng/mL  AFP (07/06/2023): 3.33 ng/mL  AFP (05/24/2023): 2.66 ng/mL  AFP (05/01/2023): < 2 ng/mL  AFP (01/27/2023): 2.6 ng/mL  AFP (10/31/2022): 1031.0 ng/mL    IMAGING  CT liver (10/21/2022, at ):  Impression: LR 5 segment 7 lesion measuring up to 2.8 x 2.5 cm with associated TIV (tumor in vein). LR 3 legion measuring 8 mm in segment 4A.    CT chest, abdomen and pelvis with contrast (01/27/2023, at ):  Impression:  Chest: No convincing metastatic disease in the thorax.  Abdomen/Pelvis: Within the limits of the study, the area of washout representing the known HCC is stable to smaller in size. The component representing the tumor in vein is smaller in size. No new liver lesions. No distant metastatic disease.    MRI abdomen with and without contrast (04/13/2023):  Impression:  1) No solid arterial phase enhancing liver lesions identified. No delayed phase enhancing liver lesions are noted.  2) Liver cirrhosis and portal hypertension.  3) 0.6 cm simple appearing benign hepatic cyst right lobe.  4) Simple appearing cyst right kidney. No hydronephrosis.  5) Other nonacute findings.    MRI abdomen with and without contrast (11/03/2023, compared to 04/13/2023):  Impression: Little overall change. No contrast-enhancing hepatic lesions are identified.    MRI abdomen with and without contrast (05/14/2024, compared to MRI on  11/03/2023):  Impression:  1) Development of two arterial phase enhancing lesions immediately adjacent to each other localizing to segment 7/8 of the right hepatic lobe worrisome for primary HCC given extensive background changes of liver cirrhosis. Metastatic disease is also within the differential.  2) Stable simple appearing cyst right hepatic lobe.  3) Cholelithiasis.  4) Portal hypertension changes which include presence of splenorenal shunts.  5) Other incidental/nonacute findings.    CT abdomen and pelvis without contrast (06/17/2024, compared to 03/17/2024):  Impression:  1) Advanced liver cirrhosis.  2) Mild splenomegaly.  3) Prominent portosystemic collateral vessels compatible with portal hypertension. No ascites.  4) Cholelithiasis.  5) Mild constipation. No bowel obstruction.  6) Prior appendectomy and hysterectomy. Other incidental/nonacute findings.    MRI abdomen with and without contrast (08/05/2024, compared to 05/14/2024):  Impression:  1) As noted previously, two, arterial phase enhancing lesions dome of liver segment 7/8 not significantly changed in configuration from the previous exam.  2) Larger lesion is 13.1 mm and was previously 11.7 mm but could be due to differences in technique.  3) The smaller lesion is 7.2 mm and was previously 6.9 mm.  4) Segmental type enhancement is noted in the caudate lobe which may be in part related that is approximately 2.63 cm. Diffusion restriction is noted raising suspicious for separate neoplastic lesion. Area was previously obscured by motion artifact and proximity of the duodenum.  5) Small faby hepatis region lymph nodes are noted one of which is 1.45 cm and a smaller node that is 0.81 cm and were previously too small to characterize.  6) Advanced liver cirrhosis, stable. Changes of portal hypertension are again noted and stable.  7) Cholelithiasis, stable.  8) Simple appearing cyst right lobe liver, stable.    Ultrasound gallbladder  (08/31/2024):  Impression:  1) Few small gallstones. Despite a reportedly positive sonographic Zamora's sign, no other sonographic features to suggest cholecystitis    CT abdomen and pelvis with contrast (09/06/2024):  Impression: Focal hepatic observations. Li-Rads-TIV (definitely tumor in vein) and Li-Rads 5 (definite HCC).    MRI abdomen with and without contrast (11/08/2024, compared to 08/05/2024):  Impression:  1) Two lesions again noted in segement 7/8 of the liver that show arterial phase enhancement. Larger lesion is 15.2 mm and was previously 13.1 mm. Smaller lesion is 9.9 mm and was previously 7.2 mm. Overall increased in size.  2) Size increase of caudate lobe arterial phase enhancing lesion now 34.6 mm and was previously 26.3 mm.  3) Increased size of faby hepatis enhancing enlarged lymph nodes. Larger lymph node is 20.2 mm and was previously 14.5 mm. The smaller adjacent lymph node is 11.2 mm and was previously 8.1 mm.  4) Tiny segment 3 liver lesion is 5.4 mm with arterial phase enhancement and was not previously well characterized due to motion artifact.  5) Increased signal changes noted of the portal vein and more specifically the left portal venous branches suspicious for portal vein thrombosis. Correlate with ultrasound Doppler assessment.  6) Advanced liver cirrhosis with portal hypertension changes, stable.  7) Other nonacute findings stable from previous.    MRI abdomen with and without contrast (03/05/2025, compared to 11/08/2024):  Impression:  1) Decreased size of right lobe of liver lesion.  2) Decreased size of right lobe of liver lesion.  3) Decreased size of caudate lobe lesion.  4) Decreased size of faby hepatis lesion.  5) Decreased size of faby hepatis region lymph node.  6) Nodular cirrhotic contour of the liver.    MRI abdomen with and without contrast (06/09/2025, compared to 03/05/2025):  Impression:  1) Right hepatic lobe liver lesion demonstrates arterial phase enhancement  and is 1.6 cm and was previously 0.8 cm. Increased size.  2) Right hepatic lobe liver lesion is 1.9 cm and was previously 0.9 cm. Increased size.  3) New focal area of enhancement in the caudate lobe on the arterial phase that is approximately 2.3 cm suspicious for new lesion.  4) Focus of enhancement left lobe liver, segment 3, is too small to characterize but demonstrates arterial phase enhancement and is 0.6 cm. New from previous.  5) No retroperitoneal or upper abdominal adenopathy is identified.  6) Liver cirrhosis. Portal hypertension changes are stable.    PATHOLOGY    IMPRESSION AND PLAN  Ms. Brooks is a 65 y.o., white female with:  Hepatocellular carcinoma: Initially diagnosed in Fall 2022 after a CT of the liver (performed on 10/21/2022 at , summarized above) confirmed the presence of two lesions (one measuring 2.8 x 2.5 cm in segment 7 and a probable satellite measuring ~8 mm in segment 4A) in the setting of known, baseline cirrhosis and a serum AFP level of > 1000 ng/mL. Due to venous involvement by the tumor, she was/is not a candidate for a liver transplant.  medical oncology therefore recommended initiating first-line, palliative, systemic treatment with a combination of o9qaoxwt bevacizumab (the Avastin biosimilar Mvasi) and a8wzwvky atezolizumab (Tecentriq). She received a total of eight (8), b9wqzumg cycles through the [facility] between Fall 2022 and early April 2023 (the eighth and final cycle of both were given on 04/03/2023). With this therapy, MRIs of the abdomen (liver protocol) performed on 04/13/2023 and 11/03/2023 (both summarized above) showed no visible signs of a liver mass at all. Also with the therapy she has received to date, her serum AFP level declined from >1000 ng/mL in late October 2023 to solidly WNL by January 2023), consistent with a complete response in her disease. I therefore had a long discussion with the patient and her sister in Spring 2023 regarding our  treatment recommendations from that point. In short, the potential risks of any additional cycles of Avastin (particularly bleeding; she has an aortic aneurysm in addition to her baseline cirrhosis) likely outweighed any potential, minimal future benefit at that time); however, continuing indefinite, a2qkjpco atezolizumab continued to be recommended (as the current standard of care; it appears to have worked extremely well). She completed a total of eighteen (18) cycles between then and November 2023. At that time, it was her preference that we place this therapy on indefinite hold, as she was convinced the Lord had healed her again (this time of her HCC); and she believed that the indefinite atezolizumab was causing her chronic, quality-of-life limiting fatigue. While remaining fully aware of the potential risks of discontinuing this medication, she did overall clinically well over the course of the following year; however, during that time, intermittent repeat MRIs of the abdomen (performed on 05/14/2024, 08/05/2024 and 11/08/2024, all summarized above) did show, slowly, but steadily, relapsing disease, with two arterial phase enhancing lesions immediately adjacent to each other localizing to segment 7/8 now visible, and steadily reenlarging, again. By late 2024, she was (finally) agreeable to restarting palliative therapy, and she did so on 12/05/2024. She has now completed a total of nine (9) cycles of r1tjnybj atezolizumab/bevacizumab since restarting it; however, per her preference, the bevacizumab has been held beginning with the fifth cycle (in early March 2025). She has overall continued to tolerate the ongoing atezolizumab, with, not surprisingly, reincreased fatigue as her only noticeable side effect. With this therapy, the repeat MRI of the abdomen performed on 03/05/2025 (and summarized above) showed that her disease was responding again. Unfortunately, now, the most recent repeat MRI of the abdomen  "(performed on 2025 and also summarized above) is now consistent with reprogressive disease. I had a long discussion with her in clinic today regarding these developments. In short, she is now more than likely progressing through palliative immunotherapy; and, if she will only agree to one drug, then restarting the bevacizumab would likely help her the most. For now, however, it is her preference that she not receive any additional therapy at all (at least until issue #8 is further evaluated). We will therefore defer any additional cycles of atezolizumab until further notice; and we will see her back in our clinic in two weeks with CT scans of the sinuses and neck with contrast.  Cirrhosis: Likely secondary to a longstanding history of both issue #3 (which was diagnosed and reportedly cured in ~2018, probably decades after she initially contracted it through her ) and alcohol abuse (she drank \"a lot\" of beer every day for ~twenty years but quit in the ). Currently still compensated. Ongoing management per gastroenterology/hepatology.  Hepatitis C: Reportedly contracted through her  (who ultimately  from it) without her knowledge, but also reportedly diagnosed and cured with a months-long course of antivirals in ~2018. Ongoing management per gastroenterology/hepatology.  Protein calorie malnutrition: Recently still improved. Continue Marinol 2.5 mg PO BID. Continue to monitor.  Gallbladder issues: Due to issue #2, a couple of different surgeons have recommended against pursuing a cholecystectomy at this time. Continue to monitor.  Aortic aneurysm: Ongoing monitoring per CT/vascular surgery.  Fatigue: Multifactorial, with recently restarted palliative immunotherapy likely contributing. Continue Synthroid and routine TSH/T4 monitoring. Continue to monitor.  Neck pain: We will further evaluate with CTs of the neck and sinuses. Meanwhile, she was offered a Rx for a prn narcotic; however, she " declined it at this time. She was agreeable to a Rx for lidocaine patches. We will see her back in our clinic in ~two weeks.  The patient was in agreement with these plans.    It is a pleasure to participate in Ms. Brooks's care. Please do not hesitate to call with any questions or concerns that you may have.    A total of 30 minutes were spent coordinating this patient’s care in clinic today; more than 50% of this time was face-to-face with the patient, reviewing her interim medical history, discussing the results of this week's repeat MRI of the abdomen and counseling on the current evaluation, treatment and followup plan. All questions were answered to her satisfaction.    FOLLOW UP  Rx for lidocaine patches provided today. Discontinue atezolizumab, at least for now. Resume [id number] bevacizumab later this month (on 6/27), if patient agreeable at that time. Return to our clinic in ~2 weeks (on 6/27) with CTs of the neck and sinuses with contrast.          This document was electronically signed by NADEGE Boss MD June 11, 2025 16:21 EDT      CC: DO Henri Davis MD Hao Zhonglin, MD Erika G. Almodovar, MD John H. Chaney, MD

## 2025-06-11 NOTE — PROGRESS NOTES
Venipuncture Blood Specimen Collection  Venipuncture performed in right arm by Macarena Brar MA with good hemostasis. Patient tolerated the procedure well without complications.   06/11/25   Macarena Brar MA

## 2025-06-12 ENCOUNTER — TELEPHONE (OUTPATIENT)
Dept: ONCOLOGY | Facility: CLINIC | Age: 65
End: 2025-06-12

## 2025-06-12 NOTE — TELEPHONE ENCOUNTER
Patient states she was told yesterday at her follow up appt that she would not have an infusion until after the CT of her neck because the current tx was not helping. She has an infusion scheduled on 6/27, but wants to make sure she actually needs this tx before she comes to it and that it's not the same medicine she was already getting. Needs to know because she has to have 3 days prior notice for transportation.   Her number is [phone].

## 2025-06-24 DIAGNOSIS — C22.0 HEPATOCELLULAR CARCINOMA: Primary | ICD-10-CM

## 2025-06-24 RX ORDER — SODIUM CHLORIDE 9 MG/ML
20 INJECTION, SOLUTION INTRAVENOUS ONCE
OUTPATIENT
Start: 2025-07-18

## 2025-07-06 ENCOUNTER — HOSPITAL ENCOUNTER (OUTPATIENT)
Dept: CT IMAGING | Facility: HOSPITAL | Age: 65
Discharge: HOME | End: 2025-07-06
Payer: MEDICARE

## 2025-07-06 DIAGNOSIS — C22.0 HEPATOCELLULAR CARCINOMA: ICD-10-CM

## 2025-07-06 DIAGNOSIS — M54.2 NECK PAIN: ICD-10-CM

## 2025-07-06 DIAGNOSIS — R53.1 GENERALIZED WEAKNESS: ICD-10-CM

## 2025-07-06 PROCEDURE — 70491 CT SOFT TISSUE NECK W/DYE: CPT

## 2025-07-06 PROCEDURE — 70487 CT MAXILLOFACIAL W/DYE: CPT

## 2025-07-06 PROCEDURE — 25510000001 IOPAMIDOL 61 % SOLUTION: Performed by: INTERNAL MEDICINE

## 2025-07-06 RX ORDER — IOPAMIDOL 612 MG/ML
100 INJECTION, SOLUTION INTRAVASCULAR
Status: COMPLETED | OUTPATIENT
Start: 2025-07-06 | End: 2025-07-06

## 2025-07-06 RX ADMIN — IOPAMIDOL 85 ML: 612 INJECTION, SOLUTION INTRAVENOUS at 13:07

## 2025-07-07 PROCEDURE — 70487 CT MAXILLOFACIAL W/DYE: CPT | Performed by: RADIOLOGY

## 2025-07-07 PROCEDURE — 70491 CT SOFT TISSUE NECK W/DYE: CPT | Performed by: RADIOLOGY

## 2025-07-09 ENCOUNTER — OFFICE VISIT (OUTPATIENT)
Dept: ONCOLOGY | Facility: CLINIC | Age: 65
End: 2025-07-09
Payer: MEDICARE

## 2025-07-09 ENCOUNTER — LAB (OUTPATIENT)
Dept: ONCOLOGY | Facility: CLINIC | Age: 65
End: 2025-07-09
Payer: MEDICARE

## 2025-07-09 ENCOUNTER — INFUSION (OUTPATIENT)
Dept: ONCOLOGY | Facility: HOSPITAL | Age: 65
End: 2025-07-09
Payer: MEDICARE

## 2025-07-09 VITALS
HEART RATE: 67 BPM | SYSTOLIC BLOOD PRESSURE: 137 MMHG | TEMPERATURE: 97.7 F | RESPIRATION RATE: 20 BRPM | OXYGEN SATURATION: 93 % | DIASTOLIC BLOOD PRESSURE: 81 MMHG

## 2025-07-09 VITALS
TEMPERATURE: 97.7 F | RESPIRATION RATE: 18 BRPM | HEART RATE: 78 BPM | DIASTOLIC BLOOD PRESSURE: 86 MMHG | OXYGEN SATURATION: 93 % | SYSTOLIC BLOOD PRESSURE: 148 MMHG | HEIGHT: 67 IN | WEIGHT: 122 LBS | BODY MASS INDEX: 19.15 KG/M2

## 2025-07-09 DIAGNOSIS — C22.0 HEPATOCELLULAR CARCINOMA: Primary | ICD-10-CM

## 2025-07-09 DIAGNOSIS — C22.0 HEPATOCELLULAR CARCINOMA: ICD-10-CM

## 2025-07-09 LAB
ALBUMIN SERPL-MCNC: 3.8 G/DL (ref 3.5–5.2)
ALBUMIN/GLOB SERPL: 1.2 G/DL
ALP SERPL-CCNC: 83 U/L (ref 39–117)
ALT SERPL W P-5'-P-CCNC: 40 U/L (ref 1–33)
ANION GAP SERPL CALCULATED.3IONS-SCNC: 10.8 MMOL/L (ref 5–15)
AST SERPL-CCNC: 52 U/L (ref 1–32)
BASOPHILS # BLD AUTO: 0.08 10*3/MM3 (ref 0–0.2)
BASOPHILS NFR BLD AUTO: 1.3 % (ref 0–1.5)
BILIRUB SERPL-MCNC: 0.8 MG/DL (ref 0–1.2)
BILIRUB UR QL STRIP: NEGATIVE
BUN SERPL-MCNC: 8.7 MG/DL (ref 8–23)
BUN/CREAT SERPL: 12.8 (ref 7–25)
CALCIUM SPEC-SCNC: 9.3 MG/DL (ref 8.6–10.5)
CHLORIDE SERPL-SCNC: 107 MMOL/L (ref 98–107)
CLARITY UR: CLEAR
CO2 SERPL-SCNC: 22.2 MMOL/L (ref 22–29)
COLOR UR: ABNORMAL
CREAT SERPL-MCNC: 0.68 MG/DL (ref 0.57–1)
DEPRECATED RDW RBC AUTO: 51.3 FL (ref 37–54)
EGFRCR SERPLBLD CKD-EPI 2021: 95.6 ML/MIN/1.73
EOSINOPHIL # BLD AUTO: 0.48 10*3/MM3 (ref 0–0.4)
EOSINOPHIL NFR BLD AUTO: 7.8 % (ref 0.3–6.2)
ERYTHROCYTE [DISTWIDTH] IN BLOOD BY AUTOMATED COUNT: 13.4 % (ref 12.3–15.4)
GLOBULIN UR ELPH-MCNC: 3.2 GM/DL
GLUCOSE SERPL-MCNC: 110 MG/DL (ref 65–99)
GLUCOSE UR STRIP-MCNC: NEGATIVE MG/DL
HCT VFR BLD AUTO: 44.1 % (ref 34–46.6)
HGB BLD-MCNC: 14.9 G/DL (ref 12–15.9)
HGB UR QL STRIP.AUTO: NEGATIVE
IMM GRANULOCYTES # BLD AUTO: 0.01 10*3/MM3 (ref 0–0.05)
IMM GRANULOCYTES NFR BLD AUTO: 0.2 % (ref 0–0.5)
KETONES UR QL STRIP: NEGATIVE
LEUKOCYTE ESTERASE UR QL STRIP.AUTO: NEGATIVE
LYMPHOCYTES # BLD AUTO: 1.51 10*3/MM3 (ref 0.7–3.1)
LYMPHOCYTES NFR BLD AUTO: 24.6 % (ref 19.6–45.3)
MCH RBC QN AUTO: 34.7 PG (ref 26.6–33)
MCHC RBC AUTO-ENTMCNC: 33.8 G/DL (ref 31.5–35.7)
MCV RBC AUTO: 102.6 FL (ref 79–97)
MONOCYTES # BLD AUTO: 0.67 10*3/MM3 (ref 0.1–0.9)
MONOCYTES NFR BLD AUTO: 10.9 % (ref 5–12)
NEUTROPHILS NFR BLD AUTO: 3.38 10*3/MM3 (ref 1.7–7)
NEUTROPHILS NFR BLD AUTO: 55.2 % (ref 42.7–76)
NITRITE UR QL STRIP: NEGATIVE
NRBC BLD AUTO-RTO: 0 /100 WBC (ref 0–0.2)
PH UR STRIP.AUTO: 6.5 [PH] (ref 5–8)
PLATELET # BLD AUTO: 132 10*3/MM3 (ref 140–450)
PMV BLD AUTO: 9.5 FL (ref 6–12)
POTASSIUM SERPL-SCNC: 3.9 MMOL/L (ref 3.5–5.2)
PROT SERPL-MCNC: 7 G/DL (ref 6–8.5)
PROT UR QL STRIP: NEGATIVE
RBC # BLD AUTO: 4.3 10*6/MM3 (ref 3.77–5.28)
SODIUM SERPL-SCNC: 140 MMOL/L (ref 136–145)
SP GR UR STRIP: 1.01 (ref 1–1.03)
UROBILINOGEN UR QL STRIP: ABNORMAL
WBC NRBC COR # BLD AUTO: 6.13 10*3/MM3 (ref 3.4–10.8)

## 2025-07-09 PROCEDURE — 81003 URINALYSIS AUTO W/O SCOPE: CPT

## 2025-07-09 PROCEDURE — 25010000002 BEVACIZUMAB PER 10 MG: Performed by: INTERNAL MEDICINE

## 2025-07-09 PROCEDURE — 25810000003 SODIUM CHLORIDE 0.9 % SOLUTION: Performed by: INTERNAL MEDICINE

## 2025-07-09 PROCEDURE — 96413 CHEMO IV INFUSION 1 HR: CPT

## 2025-07-09 PROCEDURE — 80053 COMPREHEN METABOLIC PANEL: CPT | Performed by: INTERNAL MEDICINE

## 2025-07-09 PROCEDURE — 85025 COMPLETE CBC W/AUTO DIFF WBC: CPT | Performed by: INTERNAL MEDICINE

## 2025-07-09 RX ORDER — SODIUM CHLORIDE 9 MG/ML
20 INJECTION, SOLUTION INTRAVENOUS ONCE
Status: COMPLETED | OUTPATIENT
Start: 2025-07-09 | End: 2025-07-09

## 2025-07-09 RX ADMIN — SODIUM CHLORIDE 20 ML/HR: 9 INJECTION, SOLUTION INTRAVENOUS at 14:53

## 2025-07-09 RX ADMIN — BEVACIZUMAB 800 MG: 400 INJECTION, SOLUTION INTRAVENOUS at 15:00

## 2025-07-09 NOTE — PROGRESS NOTES
Venipuncture Blood Specimen Collection  Venipuncture performed in left arm by Macaerna Brar MA with good hemostasis. Patient tolerated the procedure well without complications.   07/09/25   Macarena Brar MA

## 2025-07-09 NOTE — PROGRESS NOTES
Name:  Noemy Brooks  :  1960  Date:  2025     REFERRING PHYSICIAN  Henri Melvin MD    PRIMARY CARE PHYSICIAN  Lynn, Haley Roca DO    REASON FOR FOLLOWUP  1. Hepatocellular carcinoma      CHIEF COMPLAINT  Right sided neck pain/stiffness, recently about the same.    Dear Dr. Bailey,    HISTORY OF PRESENT ILLNESS:   I saw Ms. Brooks in followup today in our medical oncology clinic. As you are aware, she is a pleasant, 65 y.o., white female with a history of hypertension, hepatitis C (treated with antivirals and reportedly cured in ~), cirrhosis and hepatocellular carcinoma who was initially diagnosed with the latter in ~2022. [place], [place]. Her [facility] gastroenterologist referred her to  at that time once she was found to have a couple of liver masses and a serum AFP > 1,000 ng/mL. She was evaluated by the liver transplant service; however, due to the HCC's involvement of the hepatic vessels, she was felt to not be a candidate (for transplant). She was subsequently evaluated by  medical oncology, who recommended starting first-line, palliative treatment with a combination of e1tdvmwd bevacizumab (Mvasi) and atezolizumab (Tecentriq), per the current standard of care. She received a total of eight (8) cycles between 2022 and early 2023 through the [facility]. Due to transportation issues (her sister has to be the one to drive her, and the trips to [place] have been getting increasingly difficult for her), she presented to our clinic in 2023 in order to transfer her ongoing, oncologic care to us, closer to her home in [place], [place]. She tolerated w4vnecbq atezolizumab overall well, and she received a total of eighteen (18) cycles between  and early 2023. At that time and per her preference, we have discontinued this therapy. She previously stated that the Lord cured her once before (of Hepatitis C), and she became convinced that he has/will do it  again (this time for her HCC). She did overall well for the next ~year, and her chronic fatigue did improve; but, unfortunately, by early November 2024, repeat imaging was consistent with reprogressive disease. She was agreeable to restarting Imfinzi and Avastin at that time, and she did so on 12/05/2025.    INTERIM HISTORY:  Ms. Brooks returns to clinic today for follow up by herself. Due to her history of cirrhosis, she has been deemed to be too a high of a risk to undergo the cholecystectomy she had previously been pursuing. She has now completed a total of ten (10), h0yrnote cycles of atezolizumab/bevacizumab since restarting it in early December 2024 (although, per her preference, the bevacizumab has been held beginning with the fifth cycle (in early March 2025); and the atezolizumab was discontinued by early June 2025 due to progressive disease. She continues to have some intermittent, refractory pains/tightness in her right neck, which makes it hard for her to stay asleep; and these symptoms have recently been about the same. She has no other new or specific complaints.    Past Medical History:   Diagnosis Date    Acid reflux     Aortic aneurysm     Cancer     LIVER, UTERINE    Cirrhosis     COPD (chronic obstructive pulmonary disease)     Depression     Disease of thyroid gland     Elevated cholesterol     High cholesterol     History of transfusion     Hypertension     Infectious viral hepatitis     2019    Osteoporosis     Ovarian cancer        Past Surgical History:   Procedure Laterality Date    APPENDECTOMY N/A     COLONOSCOPY      ENDOSCOPY N/A 8/8/2023    Procedure: ESOPHAGOGASTRODUODENOSCOPY WITH BIOPSY;  Surgeon: Rahel Sesay MD;  Location: [facility];  Service: Gastroenterology;  Laterality: N/A;    FRACTURE SURGERY Left     ARM, HAS 2 SCREWS    HYSTERECTOMY      UPPER GASTROINTESTINAL ENDOSCOPY         Social History     Socioeconomic History    Marital status:     Number of  children: 3   Tobacco Use    Smoking status: Former     Current packs/day: 0.00     Average packs/day: 1.5 packs/day for 25.0 years (37.5 ttl pk-yrs)     Types: Cigarettes     Start date: 5/1/1990     Quit date: 5/1/2015     Years since quitting: 10.1    Smokeless tobacco: Never    Tobacco comments:     smoked for approx 20 years, 1.5 to 2 ppd   Vaping Use    Vaping status: Never Used   Substance and Sexual Activity    Alcohol use: No     Comment: she previously drank daily for 16 years, beer    Drug use: No    Sexual activity: Defer       Family History   Problem Relation Age of Onset    Stroke Mother     Hypertension Mother     COPD Mother     Throat cancer Mother     Arthritis Mother     Asthma Mother     Heart attack Mother     Stroke Father     Cancer Father     Liver cancer Father        Allergies   Allergen Reactions    Duloxetine Hcl Other (See Comments)     Cymbalta    Codeine GI Intolerance       Current Outpatient Medications   Medication Sig Dispense Refill    albuterol sulfate  (90 Base) MCG/ACT inhaler Inhale 2 puffs Every 4 (Four) Hours As Needed for Wheezing.      Antacid/[place]ig[place] 400-400-40 MG/10ML suspension Take 5 mL by mouth Every 6 (Six) Hours As Needed.      Atezolizumab (TECENTRIQ IV) Infuse  into a venous catheter Every 21 (Twenty-One) Days.      B Complex Vitamins (VITAMIN B COMPLEX PO) Take  by mouth.      budesonide-formoterol (SYMBICORT) 160-4.5 MCG/ACT inhaler Inhale 2 puffs 2 (Two) Times a Day.      cholecalciferol (VITAMIN D3) 25 MCG (1000 UT) tablet Take 1 tablet by mouth Daily.      Weraogjcddofvwb72.5mg/5ml:Vhuvknv567-634-31kb/5ml:LidocaineVisc2%sol:Ybmnxqap545234jczu/ml 1:1:1:1 Swish and swallow 5 mL Every 4 (Four) to 6 (Six) Hours As Needed. 240 mL 3    glycerin adult 2 g suppository Insert 1 suppository into the rectum As Needed.      lidocaine (LIDODERM) 5 % Place 1 patch on the skin as directed by provider Daily. Remove & Discard patch within 12 hours or as directed by MD  30 each 2    Magnesium Hydroxide (MILK OF MAGNESIA PO) Take  by mouth.      multivitamin (MULTI VITAMIN DAILY PO) Take 1 tablet by mouth Daily.      ondansetron ODT (ZOFRAN-ODT) 4 MG disintegrating tablet Place 1 tablet on the tongue Every 6 (Six) Hours As Needed for Nausea or Vomiting. 12 tablet 0    traZODone (DESYREL) 100 MG tablet Take 1 tablet by mouth Every Night.      Trelegy Ellipta 200-62.5-25 MCG/ACT inhaler       levothyroxine (SYNTHROID, LEVOTHROID) 25 MCG tablet Take 1 tablet by mouth Every Morning. (Patient not taking: Reported on 7/9/2025) 30 tablet 5    oxyCODONE (Roxicodone) 5 MG immediate release tablet Take 1 tablet by mouth Every 6 (Six) Hours As Needed for Severe Pain. (Patient not taking: Reported on 7/9/2025) 10 tablet 0    pantoprazole (PROTONIX) 40 MG EC tablet Take 1 tablet by mouth Daily. (Patient not taking: Reported on 7/9/2025) 30 tablet 0     No current facility-administered medications for this visit.     REVIEW OF SYSTEMS  CONSTITUTIONAL:  No fever, chills or night sweats. Chronic fatigue, recently reworsened since restarting palliative immunotherapy/targeted therapy in December 2024.  EYES:  No blurry vision, diplopia or other vision changes.  ENT:  No hearing loss, nosebleeds or sore throat.  CARDIOVASCULAR:  No palpitations, arrhythmia, syncopal episodes or edema.  PULMONARY:  No hemoptysis, wheezing, chronic cough or shortness of breath.  GASTROINTESTINAL:  As per the HPI above.  GENITOURINARY:  No hematuria, kidney stones or frequent urination.  MUSCULOSKELETAL:  As per the HPI above.  INTEGUMENTARY: No rashes or pruritus.  ENDOCRINE:  No excessive thirst or hot flashes.  HEMATOLOGIC:  No history of free bleeding, spontaneous bleeding or clotting.  IMMUNOLOGIC:  No allergies or frequent infections.  NEUROLOGIC: No numbness, tingling, seizures or weakness.  PSYCHIATRIC:  No anxiety or depression.    PHYSICAL EXAMINATION  /86   Pulse 78   Temp 97.7 °F (36.5 °C) (Temporal)   " Resp 18   Ht 170.2 cm (67.01\")   Wt 55.3 kg (122 lb)   SpO2 93%   BMI 19.10 kg/m²     Pain Score:  Pain Score    25 1259   PainSc: 0-No pain     PHQ-Score Total:  PHQ-9 Total Score:      ECO  GENERAL:  A well-developed, well-nourished, thin, white female in no acute distress.  HEENT:  Pupils equally round and reactive to light. Extraocular muscles intact. No readily palpable masses or adenopathy in the neck.  CARDIOVASCULAR:  Regular rate and rhythm. No murmurs, gallops or rubs.  LUNGS:  Clear to auscultation bilaterally.  ABDOMEN:  Soft, nontender, nondistended with positive bowel sounds.  EXTREMITIES:  No clubbing, cyanosis or edema bilaterally.  SKIN:  No rashes or petechiae.  NEURO:  Cranial nerves grossly intact. No focal deficits.  PSYCH:  Alert and oriented x3.    The physical exam is unchanged from the previous one.    LABORATORY  Lab Results   Component Value Date    WBC 6.13 2025    HGB 14.9 2025    HCT 44.1 2025    .6 (H) 2025     (L) 2025    NEUTROABS 3.38 2025       Lab Results   Component Value Date     2025    K 3.3 (L) 2025     (H) 2025    CO2 22.7 2025    BUN 10.4 2025    CREATININE 0.67 2025    GLUCOSE 102 (H) 2025    CALCIUM 8.9 2025    AST 40 (H) 2025    ALT 33 2025    ALKPHOS 80 2025    BILITOT 0.7 2025    PROTEINTOT 6.6 2025    ALBUMIN 3.6 2025     CBC (2025): WBCs: 6.13; HgB: 14.9; Hct: 44.1; platelets: 132  CBC (2025): WBCs: 5.88; HgB: 14.5; Hct: 42.9; platelets: 131  CBC (2025): WBCs: 6.94; HgB: 15.5; Hct: 45.5; platelets: 127  CBC (01/15/2025): WBCs: 5.42; HgB: 14.5; Hct: 43.0; platelets: 128  CBC (2024): WBCs: 5.97; HgB: 13.1; Hct: 39,8; platelets: 124  CBC (2024): WBCs: 6.68; HgB: 13.9; Hct: 42.2; platelets: 170  CBC (2024): WBCs: 4.92; HgB: 13.9; Hct: 40.9; platelets: 126  CBC (2024): " WBCs: 6.29; HgB: 14.3; Hct: 42.9; platelets: 150  CBC (12/19/2023): WBCs: 5.64; HgB: 14.5; Hct: 44.2; platelets: 120  CBC (10/19/2023): WBCs: 6.05; HgB: 14.4; Hct: 43.7; platelets: 119  CBC (08/16/2023): WBCs: 5.87; HgB: 14.3; Hct: 44.2; platelets: 120  CBC (05/24/2023): WBCs: 7.45; HgB: 15.8; Hct: 47.0; platelets: 125  CBC (05/01/2023): WBCs: 6.91; HgB: 15.9; Hct: 47.7; platelets: 118  CBC (04/03/2023): WBCs: 6.68; HgB: 15.9; Hct: 45.7; platelets: 116; MCV: 94    AFP (06/11/2025): 55.2 ng/mL  AFP (03/07/2025): 35.0 ng/mL  AFP (01/15/2025): 22.2 ng/mL  AFP (11/14/2024): 36.6 ng/mL  AFP (09/25/2024): 19.4 ng/mL  AFP (08/14/2024): 15.10 ng/mL  AFP (05/21/2024): 9.44 ng/mL  AFP (02/21/2024): 7.07 ng/mL  AFP (12/19/2023): 5.88 ng/mL  AFP (11/09/2023): 6.00 ng.mL  AFP (09/28/2023): 5.55 ng/mL  AFP (08/16/2023): 3.96 ng/mL  AFP (07/06/2023): 3.33 ng/mL  AFP (05/24/2023): 2.66 ng/mL  AFP (05/01/2023): < 2 ng/mL  AFP (01/27/2023): 2.6 ng/mL  AFP (10/31/2022): 1031.0 ng/mL    IMAGING  CT liver (10/21/2022, at ):  Impression: LR 5 segment 7 lesion measuring up to 2.8 x 2.5 cm with associated TIV (tumor in vein). LR 3 legion measuring 8 mm in segment 4A.    CT chest, abdomen and pelvis with contrast (01/27/2023, at ):  Impression:  Chest: No convincing metastatic disease in the thorax.  Abdomen/Pelvis: Within the limits of the study, the area of washout representing the known HCC is stable to smaller in size. The component representing the tumor in vein is smaller in size. No new liver lesions. No distant metastatic disease.    MRI abdomen with and without contrast (04/13/2023):  Impression:  1) No solid arterial phase enhancing liver lesions identified. No delayed phase enhancing liver lesions are noted.  2) Liver cirrhosis and portal hypertension.  3) 0.6 cm simple appearing benign hepatic cyst right lobe.  4) Simple appearing cyst right kidney. No hydronephrosis.  5) Other nonacute findings.    MRI abdomen with and  without contrast (11/03/2023, compared to 04/13/2023):  Impression: Little overall change. No contrast-enhancing hepatic lesions are identified.    MRI abdomen with and without contrast (05/14/2024, compared to MRI on 11/03/2023):  Impression:  1) Development of two arterial phase enhancing lesions immediately adjacent to each other localizing to segment 7/8 of the right hepatic lobe worrisome for primary HCC given extensive background changes of liver cirrhosis. Metastatic disease is also within the differential.  2) Stable simple appearing cyst right hepatic lobe.  3) Cholelithiasis.  4) Portal hypertension changes which include presence of splenorenal shunts.  5) Other incidental/nonacute findings.    CT abdomen and pelvis without contrast (06/17/2024, compared to 03/17/2024):  Impression:  1) Advanced liver cirrhosis.  2) Mild splenomegaly.  3) Prominent portosystemic collateral vessels compatible with portal hypertension. No ascites.  4) Cholelithiasis.  5) Mild constipation. No bowel obstruction.  6) Prior appendectomy and hysterectomy. Other incidental/nonacute findings.    MRI abdomen with and without contrast (08/05/2024, compared to 05/14/2024):  Impression:  1) As noted previously, two, arterial phase enhancing lesions dome of liver segment 7/8 not significantly changed in configuration from the previous exam.  2) Larger lesion is 13.1 mm and was previously 11.7 mm but could be due to differences in technique.  3) The smaller lesion is 7.2 mm and was previously 6.9 mm.  4) Segmental type enhancement is noted in the caudate lobe which may be in part related that is approximately 2.63 cm. Diffusion restriction is noted raising suspicious for separate neoplastic lesion. Area was previously obscured by motion artifact and proximity of the duodenum.  5) Small faby hepatis region lymph nodes are noted one of which is 1.45 cm and a smaller node that is 0.81 cm and were previously too small to characterize.  6)  Advanced liver cirrhosis, stable. Changes of portal hypertension are again noted and stable.  7) Cholelithiasis, stable.  8) Simple appearing cyst right lobe liver, stable.    Ultrasound gallbladder (08/31/2024):  Impression:  1) Few small gallstones. Despite a reportedly positive sonographic Zamora's sign, no other sonographic features to suggest cholecystitis    CT abdomen and pelvis with contrast (09/06/2024):  Impression: Focal hepatic observations. Li-Rads-TIV (definitely tumor in vein) and Li-Rads 5 (definite HCC).    MRI abdomen with and without contrast (11/08/2024, compared to 08/05/2024):  Impression:  1) Two lesions again noted in segement 7/8 of the liver that show arterial phase enhancement. Larger lesion is 15.2 mm and was previously 13.1 mm. Smaller lesion is 9.9 mm and was previously 7.2 mm. Overall increased in size.  2) Size increase of caudate lobe arterial phase enhancing lesion now 34.6 mm and was previously 26.3 mm.  3) Increased size of faby hepatis enhancing enlarged lymph nodes. Larger lymph node is 20.2 mm and was previously 14.5 mm. The smaller adjacent lymph node is 11.2 mm and was previously 8.1 mm.  4) Tiny segment 3 liver lesion is 5.4 mm with arterial phase enhancement and was not previously well characterized due to motion artifact.  5) Increased signal changes noted of the portal vein and more specifically the left portal venous branches suspicious for portal vein thrombosis. Correlate with ultrasound Doppler assessment.  6) Advanced liver cirrhosis with portal hypertension changes, stable.  7) Other nonacute findings stable from previous.    MRI abdomen with and without contrast (03/05/2025, compared to 11/08/2024):  Impression:  1) Decreased size of right lobe of liver lesion.  2) Decreased size of right lobe of liver lesion.  3) Decreased size of caudate lobe lesion.  4) Decreased size of faby hepatis lesion.  5) Decreased size of faby hepatis region lymph node.  6) Nodular  cirrhotic contour of the liver.    MRI abdomen with and without contrast (06/09/2025, compared to 03/05/2025):  Impression:  1) Right hepatic lobe liver lesion demonstrates arterial phase enhancement and is 1.6 cm and was previously 0.8 cm. Increased size.  2) Right hepatic lobe liver lesion is 1.9 cm and was previously 0.9 cm. Increased size.  3) New focal area of enhancement in the caudate lobe on the arterial phase that is approximately 2.3 cm suspicious for new lesion.  4) Focus of enhancement left lobe liver, segment 3, is too small to characterize but demonstrates arterial phase enhancement and is 0.6 cm. New from previous.  5) No retroperitoneal or upper abdominal adenopathy is identified.  6) Liver cirrhosis. Portal hypertension changes are stable.    CT sinus with contrast (07/06/2025):  Impression: Slight asymmetry of the soft tissue in the nasopharynx posteriorly left side near fossa of Rosenmuller. Direct visualization recommended.    CT neck with contrast (07/06/2025):  Impression: Right ICA shows about 40% stenosis due to calcific plaque proximally. The left ICA shows about 50% calcific stenosis.    PATHOLOGY    IMPRESSION AND PLAN  Ms. Brooks is a 65 y.o., white female with:  Hepatocellular carcinoma: Initially diagnosed in Fall 2022 after a CT of the liver (performed on 10/21/2022 at , summarized above) confirmed the presence of two lesions (one measuring 2.8 x 2.5 cm in segment 7 and a probable satellite measuring ~8 mm in segment 4A) in the setting of known, baseline cirrhosis and a serum AFP level of > 1000 ng/mL. Due to venous involvement by the tumor, she was/is not a candidate for a liver transplant.  medical oncology therefore recommended initiating first-line, palliative, systemic treatment with a combination of u4dvggkh bevacizumab (the Avastin biosimilar Mvasi) and f2hiqmol atezolizumab (Tecentriq). She received a total of eight (8), w1rgelxg cycles through the [facility]  between Fall 2022 and early April 2023 (the eighth and final cycle of both were given on 04/03/2023). With this therapy, MRIs of the abdomen (liver protocol) performed on 04/13/2023 and 11/03/2023 (both summarized above) showed no visible signs of a liver mass at all. Also with the therapy she has received to date, her serum AFP level declined from >1000 ng/mL in late October 2023 to solidly WNL by January 2023), consistent with a complete response in her disease. I therefore had a long discussion with the patient and her sister in Spring 2023 regarding our treatment recommendations from that point. In short, the potential risks of any additional cycles of Avastin (particularly bleeding; she has an aortic aneurysm in addition to her baseline cirrhosis) likely outweighed any potential, minimal future benefit at that time); however, continuing indefinite, l1mvwixy atezolizumab continued to be recommended (as the current standard of care; it appears to have worked extremely well). She completed a total of eighteen (18) cycles between then and November 2023. At that time, it was her preference that we place this therapy on indefinite hold, as she was convinced the Lord had healed her again (this time of her HCC); and she believed that the indefinite atezolizumab was causing her chronic, quality-of-life limiting fatigue. While remaining fully aware of the potential risks of discontinuing this medication, she did overall clinically well over the course of the following year; however, during that time, intermittent repeat MRIs of the abdomen (performed on 05/14/2024, 08/05/2024 and 11/08/2024, all summarized above) did show, slowly, but steadily, relapsing disease, with two arterial phase enhancing lesions immediately adjacent to each other localizing to segment 7/8 now visible, and steadily reenlarging, again. By late 2024, she was (finally) agreeable to restarting palliative therapy, and she did so on 12/05/2024. She has  "now completed a total of ten (10) cycles of o0mqdmsg atezolizumab/bevacizumab since restarting it; however, per her preference, the bevacizumab has been held beginning with the fifth cycle (in early 2025). The repeat MRI of the abdomen performed on 2025 (and summarized above) showed that her disease was responding again. Unfortunately, now, the most recent repeat MRI of the abdomen (performed on 2025 and also summarized above) is consistent with reprogressive disease; and her most recent repeat AFP levels (trended above) have been reincreasing. I had a long discussion with her again today regarding these developments. In short, she is now more than likely progressing through palliative immunotherapy; and, if she will only agree to one drug, then restarting the bevacizumab would likely help her the most. She is now agreeable to this. We will therefore continue to defer any additional cycles of atezolizumab; however, we will restart z5qpahth bevacizumab this afternoon. We will see her back in our clinic in ~nine weeks, on a bevacizumab treatment day in ~early to mid-September, with a CBC, CMP, TSH, AFP and a repeat MRI of the abdomen with and without contrast.  Cirrhosis: Likely secondary to a longstanding history of both issue #3 (which was diagnosed and reportedly cured in ~2018, probably decades after she initially contracted it through her ) and alcohol abuse (she drank \"a lot\" of beer every day for ~twenty years but quit in the ). Currently still compensated. Ongoing management per gastroenterology/hepatology.  Hepatitis C: Reportedly contracted through her  (who ultimately  from it) without her knowledge, but also reportedly diagnosed and cured with a months-long course of antivirals in ~. Ongoing management per gastroenterology/hepatology.  Protein calorie malnutrition: Recently still improved. Continue Marinol 2.5 mg PO BID. Continue to monitor.  Gallbladder issues: " Due to issue #2, a couple of different surgeons have recommended against pursuing a cholecystectomy at this time. Continue to monitor.  Aortic aneurysm: Ongoing monitoring per CT/vascular surgery.  Fatigue: Multifactorial, with recent palliative immunotherapy (which has now been discontinued) likely contributing. Continue Synthroid and routine TSH/T4 monitoring. Continue to monitor.  Neck pain: CTs of the neck and chest with contrast performed on 07/06/2025 (and summarized above) for further evaluation are overall unremarkable. Likely secondary to arthritis/DDD. Continue prn lidocaine patches. Continue to monitor.  The patient was in agreement with these plans.    It is a pleasure to participate in Ms. Brooks's care. Please do not hesitate to call with any questions or concerns that you may have.    A total of 30 minutes were spent coordinating this patient’s care in clinic today; more than 50% of this time was face-to-face with the patient, reviewing her interim medical history, discussing the results of the recent CT scans of the neck and chest and counseling on the current treatment and followup plan. All questions were answered to her satisfaction.    FOLLOW UP  Continue topical lidocaine patches. No additional cycles of atezolizumab. Restart l4qwnmhh bevacizumab today, as planned. Return to our clinic in 9 weeks, on a bevacizumab treatment day (in ~early to mid-September), with a CBC, CMP, TSH, AFP and repeat MRI of the abdomen with and without contrast.          This document was electronically signed by NADEGE Boss MD July 9, 2025 13:18 EDT      CC: DO Henri Davis MD Hao Zhonglin, MD Erika G. Almodovar, MD John H. Chaney, MD

## 2025-07-24 DIAGNOSIS — C22.0 HEPATOCELLULAR CARCINOMA: Primary | ICD-10-CM

## 2025-07-24 RX ORDER — SODIUM CHLORIDE 9 MG/ML
20 INJECTION, SOLUTION INTRAVENOUS ONCE
OUTPATIENT
Start: 2025-08-20

## 2025-07-24 RX ORDER — SODIUM CHLORIDE 9 MG/ML
20 INJECTION, SOLUTION INTRAVENOUS ONCE
OUTPATIENT
Start: 2025-09-10

## 2025-07-30 ENCOUNTER — LAB (OUTPATIENT)
Dept: ONCOLOGY | Facility: HOSPITAL | Age: 65
End: 2025-07-30
Payer: MEDICARE

## 2025-07-30 ENCOUNTER — TRANSCRIBE ORDERS (OUTPATIENT)
Dept: ADMINISTRATIVE | Facility: HOSPITAL | Age: 65
End: 2025-07-30
Payer: MEDICARE

## 2025-07-30 ENCOUNTER — INFUSION (OUTPATIENT)
Dept: ONCOLOGY | Facility: HOSPITAL | Age: 65
End: 2025-07-30
Payer: MEDICARE

## 2025-07-30 ENCOUNTER — HOSPITAL ENCOUNTER (OUTPATIENT)
Dept: GENERAL RADIOLOGY | Facility: HOSPITAL | Age: 65
Discharge: HOME | End: 2025-07-30
Payer: MEDICARE

## 2025-07-30 VITALS
HEART RATE: 74 BPM | RESPIRATION RATE: 20 BRPM | OXYGEN SATURATION: 92 % | DIASTOLIC BLOOD PRESSURE: 60 MMHG | TEMPERATURE: 97.9 F | WEIGHT: 122.4 LBS | SYSTOLIC BLOOD PRESSURE: 127 MMHG | BODY MASS INDEX: 19.17 KG/M2

## 2025-07-30 DIAGNOSIS — M25.571 ARTHRALGIA OF RIGHT ANKLE: Primary | ICD-10-CM

## 2025-07-30 DIAGNOSIS — C22.0 HEPATOCELLULAR CARCINOMA: ICD-10-CM

## 2025-07-30 DIAGNOSIS — Z79.899 LONG-TERM USE OF HIGH-RISK MEDICATION: Primary | ICD-10-CM

## 2025-07-30 LAB
ALBUMIN SERPL-MCNC: 3.7 G/DL (ref 3.5–5.2)
ALBUMIN/GLOB SERPL: 1.3 G/DL
ALP SERPL-CCNC: 85 U/L (ref 39–117)
ALT SERPL W P-5'-P-CCNC: 33 U/L (ref 1–33)
ANION GAP SERPL CALCULATED.3IONS-SCNC: 14 MMOL/L (ref 5–15)
AST SERPL-CCNC: 45 U/L (ref 1–32)
BASOPHILS # BLD AUTO: 0.07 10*3/MM3 (ref 0–0.2)
BASOPHILS NFR BLD AUTO: 0.9 % (ref 0–1.5)
BILIRUB SERPL-MCNC: 1.2 MG/DL (ref 0–1.2)
BILIRUB UR QL STRIP: NEGATIVE
BUN SERPL-MCNC: 12.3 MG/DL (ref 8–23)
BUN/CREAT SERPL: 15.2 (ref 7–25)
CALCIUM SPEC-SCNC: 10.2 MG/DL (ref 8.6–10.5)
CHLORIDE SERPL-SCNC: 106 MMOL/L (ref 98–107)
CLARITY UR: CLEAR
CO2 SERPL-SCNC: 22 MMOL/L (ref 22–29)
COLOR UR: ABNORMAL
CREAT SERPL-MCNC: 0.81 MG/DL (ref 0.57–1)
DEPRECATED RDW RBC AUTO: 52.8 FL (ref 37–54)
EGFRCR SERPLBLD CKD-EPI 2021: 79.7 ML/MIN/1.73
EOSINOPHIL # BLD AUTO: 0.48 10*3/MM3 (ref 0–0.4)
EOSINOPHIL NFR BLD AUTO: 6.5 % (ref 0.3–6.2)
ERYTHROCYTE [DISTWIDTH] IN BLOOD BY AUTOMATED COUNT: 13.9 % (ref 12.3–15.4)
GLOBULIN UR ELPH-MCNC: 2.9 GM/DL
GLUCOSE SERPL-MCNC: 109 MG/DL (ref 65–99)
GLUCOSE UR STRIP-MCNC: NEGATIVE MG/DL
HCT VFR BLD AUTO: 42.5 % (ref 34–46.6)
HGB BLD-MCNC: 14.2 G/DL (ref 12–15.9)
HGB UR QL STRIP.AUTO: NEGATIVE
IMM GRANULOCYTES # BLD AUTO: 0.01 10*3/MM3 (ref 0–0.05)
IMM GRANULOCYTES NFR BLD AUTO: 0.1 % (ref 0–0.5)
KETONES UR QL STRIP: ABNORMAL
LEUKOCYTE ESTERASE UR QL STRIP.AUTO: ABNORMAL
LYMPHOCYTES # BLD AUTO: 1.49 10*3/MM3 (ref 0.7–3.1)
LYMPHOCYTES NFR BLD AUTO: 20.2 % (ref 19.6–45.3)
MCH RBC QN AUTO: 34 PG (ref 26.6–33)
MCHC RBC AUTO-ENTMCNC: 33.4 G/DL (ref 31.5–35.7)
MCV RBC AUTO: 101.7 FL (ref 79–97)
MONOCYTES # BLD AUTO: 0.93 10*3/MM3 (ref 0.1–0.9)
MONOCYTES NFR BLD AUTO: 12.6 % (ref 5–12)
NEUTROPHILS NFR BLD AUTO: 4.41 10*3/MM3 (ref 1.7–7)
NEUTROPHILS NFR BLD AUTO: 59.7 % (ref 42.7–76)
NITRITE UR QL STRIP: NEGATIVE
NRBC BLD AUTO-RTO: 0 /100 WBC (ref 0–0.2)
PH UR STRIP.AUTO: 6 [PH] (ref 5–8)
PLATELET # BLD AUTO: 131 10*3/MM3 (ref 140–450)
PMV BLD AUTO: 9.4 FL (ref 6–12)
POTASSIUM SERPL-SCNC: 3.3 MMOL/L (ref 3.5–5.2)
PROT SERPL-MCNC: 6.6 G/DL (ref 6–8.5)
PROT UR QL STRIP: NEGATIVE
RBC # BLD AUTO: 4.18 10*6/MM3 (ref 3.77–5.28)
SODIUM SERPL-SCNC: 142 MMOL/L (ref 136–145)
SP GR UR STRIP: 1.01 (ref 1–1.03)
T4 FREE SERPL-MCNC: 0.73 NG/DL (ref 0.92–1.68)
TSH SERPL DL<=0.05 MIU/L-ACNC: 3.49 UIU/ML (ref 0.27–4.2)
UROBILINOGEN UR QL STRIP: ABNORMAL
WBC NRBC COR # BLD AUTO: 7.39 10*3/MM3 (ref 3.4–10.8)

## 2025-07-30 PROCEDURE — 25810000003 SODIUM CHLORIDE 0.9 % SOLUTION: Performed by: INTERNAL MEDICINE

## 2025-07-30 PROCEDURE — 85025 COMPLETE CBC W/AUTO DIFF WBC: CPT

## 2025-07-30 PROCEDURE — 84443 ASSAY THYROID STIM HORMONE: CPT

## 2025-07-30 PROCEDURE — 73610 X-RAY EXAM OF ANKLE: CPT

## 2025-07-30 PROCEDURE — 25010000002 BEVACIZUMAB PER 10 MG: Performed by: INTERNAL MEDICINE

## 2025-07-30 PROCEDURE — 73610 X-RAY EXAM OF ANKLE: CPT | Performed by: RADIOLOGY

## 2025-07-30 PROCEDURE — 81003 URINALYSIS AUTO W/O SCOPE: CPT

## 2025-07-30 PROCEDURE — 80053 COMPREHEN METABOLIC PANEL: CPT

## 2025-07-30 PROCEDURE — 84439 ASSAY OF FREE THYROXINE: CPT

## 2025-07-30 PROCEDURE — 96413 CHEMO IV INFUSION 1 HR: CPT

## 2025-07-30 RX ORDER — SODIUM CHLORIDE 9 MG/ML
20 INJECTION, SOLUTION INTRAVENOUS ONCE
Status: COMPLETED | OUTPATIENT
Start: 2025-07-30 | End: 2025-07-30

## 2025-07-30 RX ADMIN — BEVACIZUMAB 800 MG: 400 INJECTION, SOLUTION INTRAVENOUS at 16:13

## 2025-07-30 RX ADMIN — SODIUM CHLORIDE 20 ML/HR: 9 INJECTION, SOLUTION INTRAVENOUS at 16:13

## 2025-08-11 ENCOUNTER — HOSPITAL ENCOUNTER (EMERGENCY)
Facility: HOSPITAL | Age: 65
Discharge: HOME | End: 2025-08-11
Attending: EMERGENCY MEDICINE | Admitting: EMERGENCY MEDICINE
Payer: MEDICARE

## 2025-08-11 ENCOUNTER — TELEPHONE (OUTPATIENT)
Dept: ONCOLOGY | Facility: CLINIC | Age: 65
End: 2025-08-11
Payer: MEDICARE

## 2025-08-11 ENCOUNTER — APPOINTMENT (OUTPATIENT)
Dept: GENERAL RADIOLOGY | Facility: HOSPITAL | Age: 65
End: 2025-08-11
Payer: MEDICARE

## 2025-08-11 VITALS
HEIGHT: 67 IN | BODY MASS INDEX: 19.15 KG/M2 | SYSTOLIC BLOOD PRESSURE: 157 MMHG | TEMPERATURE: 98.6 F | HEART RATE: 82 BPM | OXYGEN SATURATION: 93 % | WEIGHT: 122 LBS | RESPIRATION RATE: 16 BRPM | DIASTOLIC BLOOD PRESSURE: 89 MMHG

## 2025-08-11 DIAGNOSIS — R09.1 PLEURISY: Primary | ICD-10-CM

## 2025-08-11 LAB
ALBUMIN SERPL-MCNC: 3.5 G/DL (ref 3.5–5.2)
ALBUMIN/GLOB SERPL: 1.1 G/DL
ALP SERPL-CCNC: 86 U/L (ref 39–117)
ALT SERPL W P-5'-P-CCNC: 30 U/L (ref 1–33)
ANION GAP SERPL CALCULATED.3IONS-SCNC: 10 MMOL/L (ref 5–15)
AST SERPL-CCNC: 39 U/L (ref 1–32)
BASOPHILS # BLD AUTO: 0.08 10*3/MM3 (ref 0–0.2)
BASOPHILS NFR BLD AUTO: 1 % (ref 0–1.5)
BILIRUB SERPL-MCNC: 0.9 MG/DL (ref 0–1.2)
BUN SERPL-MCNC: 10.6 MG/DL (ref 8–23)
BUN/CREAT SERPL: 13.6 (ref 7–25)
CALCIUM SPEC-SCNC: 9.4 MG/DL (ref 8.6–10.5)
CHLORIDE SERPL-SCNC: 109 MMOL/L (ref 98–107)
CO2 SERPL-SCNC: 23 MMOL/L (ref 22–29)
CREAT SERPL-MCNC: 0.78 MG/DL (ref 0.57–1)
DEPRECATED RDW RBC AUTO: 53 FL (ref 37–54)
EGFRCR SERPLBLD CKD-EPI 2021: 83.4 ML/MIN/1.73
EOSINOPHIL # BLD AUTO: 0.89 10*3/MM3 (ref 0–0.4)
EOSINOPHIL NFR BLD AUTO: 11.5 % (ref 0.3–6.2)
ERYTHROCYTE [DISTWIDTH] IN BLOOD BY AUTOMATED COUNT: 14.1 % (ref 12.3–15.4)
GEN 5 1HR TROPONIN T REFLEX: 8 NG/L
GLOBULIN UR ELPH-MCNC: 3.1 GM/DL
GLUCOSE SERPL-MCNC: 72 MG/DL (ref 65–99)
HCT VFR BLD AUTO: 42.7 % (ref 34–46.6)
HGB BLD-MCNC: 14.6 G/DL (ref 12–15.9)
HOLD SPECIMEN: NORMAL
HOLD SPECIMEN: NORMAL
IMM GRANULOCYTES # BLD AUTO: 0.02 10*3/MM3 (ref 0–0.05)
IMM GRANULOCYTES NFR BLD AUTO: 0.3 % (ref 0–0.5)
LYMPHOCYTES # BLD AUTO: 2.16 10*3/MM3 (ref 0.7–3.1)
LYMPHOCYTES NFR BLD AUTO: 27.9 % (ref 19.6–45.3)
MCH RBC QN AUTO: 34.5 PG (ref 26.6–33)
MCHC RBC AUTO-ENTMCNC: 34.2 G/DL (ref 31.5–35.7)
MCV RBC AUTO: 100.9 FL (ref 79–97)
MONOCYTES # BLD AUTO: 1.01 10*3/MM3 (ref 0.1–0.9)
MONOCYTES NFR BLD AUTO: 13 % (ref 5–12)
NEUTROPHILS NFR BLD AUTO: 3.59 10*3/MM3 (ref 1.7–7)
NEUTROPHILS NFR BLD AUTO: 46.3 % (ref 42.7–76)
NRBC BLD AUTO-RTO: 0 /100 WBC (ref 0–0.2)
PLATELET # BLD AUTO: 143 10*3/MM3 (ref 140–450)
PMV BLD AUTO: 9.3 FL (ref 6–12)
POTASSIUM SERPL-SCNC: 3.5 MMOL/L (ref 3.5–5.2)
PROT SERPL-MCNC: 6.6 G/DL (ref 6–8.5)
RBC # BLD AUTO: 4.23 10*6/MM3 (ref 3.77–5.28)
SODIUM SERPL-SCNC: 142 MMOL/L (ref 136–145)
TROPONIN T NUMERIC DELTA: 0 NG/L
TROPONIN T SERPL HS-MCNC: 8 NG/L
WBC NRBC COR # BLD AUTO: 7.75 10*3/MM3 (ref 3.4–10.8)
WHOLE BLOOD HOLD COAG: NORMAL
WHOLE BLOOD HOLD SPECIMEN: NORMAL

## 2025-08-11 PROCEDURE — 84484 ASSAY OF TROPONIN QUANT: CPT | Performed by: EMERGENCY MEDICINE

## 2025-08-11 PROCEDURE — 36415 COLL VENOUS BLD VENIPUNCTURE: CPT | Performed by: EMERGENCY MEDICINE

## 2025-08-11 PROCEDURE — 25010000002 DEXAMETHASONE PER 1 MG: Performed by: EMERGENCY MEDICINE

## 2025-08-11 PROCEDURE — 71045 X-RAY EXAM CHEST 1 VIEW: CPT | Performed by: RADIOLOGY

## 2025-08-11 PROCEDURE — 96375 TX/PRO/DX INJ NEW DRUG ADDON: CPT

## 2025-08-11 PROCEDURE — 94640 AIRWAY INHALATION TREATMENT: CPT

## 2025-08-11 PROCEDURE — 25010000002 KETOROLAC TROMETHAMINE PER 15 MG: Performed by: EMERGENCY MEDICINE

## 2025-08-11 PROCEDURE — 94799 UNLISTED PULMONARY SVC/PX: CPT

## 2025-08-11 PROCEDURE — 80053 COMPREHEN METABOLIC PANEL: CPT | Performed by: EMERGENCY MEDICINE

## 2025-08-11 PROCEDURE — 85025 COMPLETE CBC W/AUTO DIFF WBC: CPT | Performed by: EMERGENCY MEDICINE

## 2025-08-11 PROCEDURE — 99284 EMERGENCY DEPT VISIT MOD MDM: CPT

## 2025-08-11 PROCEDURE — 93005 ELECTROCARDIOGRAM TRACING: CPT | Performed by: EMERGENCY MEDICINE

## 2025-08-11 PROCEDURE — 96374 THER/PROPH/DIAG INJ IV PUSH: CPT

## 2025-08-11 PROCEDURE — 25010000002 ONDANSETRON PER 1 MG: Performed by: EMERGENCY MEDICINE

## 2025-08-11 PROCEDURE — 71045 X-RAY EXAM CHEST 1 VIEW: CPT

## 2025-08-11 RX ORDER — KETOROLAC TROMETHAMINE 30 MG/ML
30 INJECTION, SOLUTION INTRAMUSCULAR; INTRAVENOUS ONCE
Status: COMPLETED | OUTPATIENT
Start: 2025-08-11 | End: 2025-08-11

## 2025-08-11 RX ORDER — ONDANSETRON 2 MG/ML
4 INJECTION INTRAMUSCULAR; INTRAVENOUS ONCE
Status: COMPLETED | OUTPATIENT
Start: 2025-08-11 | End: 2025-08-11

## 2025-08-11 RX ORDER — DOXYCYCLINE 100 MG/1
100 CAPSULE ORAL 2 TIMES DAILY
Qty: 20 CAPSULE | Refills: 0 | Status: SHIPPED | OUTPATIENT
Start: 2025-08-11 | End: 2025-08-21

## 2025-08-11 RX ORDER — ASPIRIN 81 MG/1
324 TABLET, CHEWABLE ORAL ONCE
Status: COMPLETED | OUTPATIENT
Start: 2025-08-11 | End: 2025-08-11

## 2025-08-11 RX ORDER — DEXAMETHASONE SODIUM PHOSPHATE 10 MG/ML
10 INJECTION, SOLUTION INTRA-ARTICULAR; INTRALESIONAL; INTRAMUSCULAR; INTRAVENOUS; SOFT TISSUE ONCE
Status: COMPLETED | OUTPATIENT
Start: 2025-08-11 | End: 2025-08-11

## 2025-08-11 RX ORDER — SODIUM CHLORIDE 0.9 % (FLUSH) 0.9 %
10 SYRINGE (ML) INJECTION AS NEEDED
Status: DISCONTINUED | OUTPATIENT
Start: 2025-08-11 | End: 2025-08-11 | Stop reason: HOSPADM

## 2025-08-11 RX ORDER — IPRATROPIUM BROMIDE AND ALBUTEROL SULFATE 2.5; .5 MG/3ML; MG/3ML
3 SOLUTION RESPIRATORY (INHALATION) ONCE
Status: COMPLETED | OUTPATIENT
Start: 2025-08-11 | End: 2025-08-11

## 2025-08-11 RX ORDER — PREDNISONE 20 MG/1
20 TABLET ORAL
Qty: 15 TABLET | Refills: 0 | Status: SHIPPED | OUTPATIENT
Start: 2025-08-11

## 2025-08-11 RX ADMIN — DEXAMETHASONE SODIUM PHOSPHATE 10 MG: 10 INJECTION INTRAMUSCULAR; INTRAVENOUS at 17:05

## 2025-08-11 RX ADMIN — KETOROLAC TROMETHAMINE 30 MG: 30 INJECTION INTRAMUSCULAR; INTRAVENOUS at 17:04

## 2025-08-11 RX ADMIN — IPRATROPIUM BROMIDE AND ALBUTEROL SULFATE 3 ML: .5; 3 SOLUTION RESPIRATORY (INHALATION) at 17:20

## 2025-08-11 RX ADMIN — ASPIRIN 81 MG CHEWABLE TABLET 324 MG: 81 TABLET CHEWABLE at 15:57

## 2025-08-11 RX ADMIN — ONDANSETRON 4 MG: 2 INJECTION INTRAMUSCULAR; INTRAVENOUS at 17:04

## 2025-08-12 LAB
QT INTERVAL: 370 MS
QTC INTERVAL: 445 MS

## 2025-08-20 ENCOUNTER — APPOINTMENT (OUTPATIENT)
Dept: ONCOLOGY | Facility: HOSPITAL | Age: 65
End: 2025-08-20
Payer: MEDICARE

## 2025-08-20 ENCOUNTER — LAB (OUTPATIENT)
Dept: ONCOLOGY | Facility: CLINIC | Age: 65
End: 2025-08-20
Payer: MEDICARE

## 2025-08-20 ENCOUNTER — OFFICE VISIT (OUTPATIENT)
Dept: ONCOLOGY | Facility: CLINIC | Age: 65
End: 2025-08-20
Payer: MEDICARE

## 2025-08-20 ENCOUNTER — INFUSION (OUTPATIENT)
Dept: ONCOLOGY | Facility: HOSPITAL | Age: 65
End: 2025-08-20
Payer: MEDICARE

## 2025-08-20 VITALS
RESPIRATION RATE: 18 BRPM | DIASTOLIC BLOOD PRESSURE: 85 MMHG | HEART RATE: 85 BPM | SYSTOLIC BLOOD PRESSURE: 136 MMHG | OXYGEN SATURATION: 93 % | TEMPERATURE: 98.1 F

## 2025-08-20 VITALS
OXYGEN SATURATION: 93 % | HEIGHT: 67 IN | HEART RATE: 78 BPM | DIASTOLIC BLOOD PRESSURE: 88 MMHG | SYSTOLIC BLOOD PRESSURE: 158 MMHG | WEIGHT: 122 LBS | BODY MASS INDEX: 19.15 KG/M2 | RESPIRATION RATE: 18 BRPM | TEMPERATURE: 97.3 F

## 2025-08-20 DIAGNOSIS — C22.0 HEPATOCELLULAR CARCINOMA: ICD-10-CM

## 2025-08-20 DIAGNOSIS — C22.0 HEPATOCELLULAR CARCINOMA: Primary | ICD-10-CM

## 2025-08-20 LAB
ALBUMIN SERPL-MCNC: 3.5 G/DL (ref 3.5–5.2)
ALBUMIN/GLOB SERPL: 1.1 G/DL
ALP SERPL-CCNC: 93 U/L (ref 39–117)
ALT SERPL W P-5'-P-CCNC: 50 U/L (ref 1–33)
ANION GAP SERPL CALCULATED.3IONS-SCNC: 10.2 MMOL/L (ref 5–15)
AST SERPL-CCNC: 44 U/L (ref 1–32)
BASOPHILS # BLD AUTO: 0.02 10*3/MM3 (ref 0–0.2)
BASOPHILS NFR BLD AUTO: 0.2 % (ref 0–1.5)
BILIRUB SERPL-MCNC: 1.3 MG/DL (ref 0–1.2)
BILIRUB UR QL STRIP: NEGATIVE
BUN SERPL-MCNC: 13.5 MG/DL (ref 8–23)
BUN/CREAT SERPL: 17.5 (ref 7–25)
CALCIUM SPEC-SCNC: 9.3 MG/DL (ref 8.6–10.5)
CHLORIDE SERPL-SCNC: 105 MMOL/L (ref 98–107)
CLARITY UR: CLEAR
CO2 SERPL-SCNC: 22.8 MMOL/L (ref 22–29)
COLOR UR: ABNORMAL
CREAT SERPL-MCNC: 0.77 MG/DL (ref 0.57–1)
DEPRECATED RDW RBC AUTO: 55.3 FL (ref 37–54)
EGFRCR SERPLBLD CKD-EPI 2021: 84.7 ML/MIN/1.73
EOSINOPHIL # BLD AUTO: 0.43 10*3/MM3 (ref 0–0.4)
EOSINOPHIL NFR BLD AUTO: 4.5 % (ref 0.3–6.2)
ERYTHROCYTE [DISTWIDTH] IN BLOOD BY AUTOMATED COUNT: 14.6 % (ref 12.3–15.4)
GLOBULIN UR ELPH-MCNC: 3.2 GM/DL
GLUCOSE SERPL-MCNC: 84 MG/DL (ref 65–99)
GLUCOSE UR STRIP-MCNC: NEGATIVE MG/DL
HCT VFR BLD AUTO: 47.1 % (ref 34–46.6)
HGB BLD-MCNC: 15.8 G/DL (ref 12–15.9)
HGB UR QL STRIP.AUTO: NEGATIVE
IMM GRANULOCYTES # BLD AUTO: 0.03 10*3/MM3 (ref 0–0.05)
IMM GRANULOCYTES NFR BLD AUTO: 0.3 % (ref 0–0.5)
KETONES UR QL STRIP: NEGATIVE
LEUKOCYTE ESTERASE UR QL STRIP.AUTO: NEGATIVE
LYMPHOCYTES # BLD AUTO: 1.49 10*3/MM3 (ref 0.7–3.1)
LYMPHOCYTES NFR BLD AUTO: 15.7 % (ref 19.6–45.3)
MCH RBC QN AUTO: 34.3 PG (ref 26.6–33)
MCHC RBC AUTO-ENTMCNC: 33.5 G/DL (ref 31.5–35.7)
MCV RBC AUTO: 102.4 FL (ref 79–97)
MONOCYTES # BLD AUTO: 1.5 10*3/MM3 (ref 0.1–0.9)
MONOCYTES NFR BLD AUTO: 15.8 % (ref 5–12)
NEUTROPHILS NFR BLD AUTO: 6.05 10*3/MM3 (ref 1.7–7)
NEUTROPHILS NFR BLD AUTO: 63.5 % (ref 42.7–76)
NITRITE UR QL STRIP: NEGATIVE
NRBC BLD AUTO-RTO: 0 /100 WBC (ref 0–0.2)
PH UR STRIP.AUTO: 7 [PH] (ref 5–8)
PLATELET # BLD AUTO: 113 10*3/MM3 (ref 140–450)
PMV BLD AUTO: 9.6 FL (ref 6–12)
POTASSIUM SERPL-SCNC: 3.1 MMOL/L (ref 3.5–5.2)
PROT SERPL-MCNC: 6.7 G/DL (ref 6–8.5)
PROT UR QL STRIP: NEGATIVE
RBC # BLD AUTO: 4.6 10*6/MM3 (ref 3.77–5.28)
SODIUM SERPL-SCNC: 138 MMOL/L (ref 136–145)
SP GR UR STRIP: 1.01 (ref 1–1.03)
UROBILINOGEN UR QL STRIP: ABNORMAL
WBC NRBC COR # BLD AUTO: 9.52 10*3/MM3 (ref 3.4–10.8)

## 2025-08-20 PROCEDURE — A9270 NON-COVERED ITEM OR SERVICE: HCPCS | Performed by: NURSE PRACTITIONER

## 2025-08-20 PROCEDURE — 1159F MED LIST DOCD IN RCRD: CPT | Performed by: NURSE PRACTITIONER

## 2025-08-20 PROCEDURE — 99214 OFFICE O/P EST MOD 30 MIN: CPT | Performed by: NURSE PRACTITIONER

## 2025-08-20 PROCEDURE — 25010000002 BEVACIZUMAB PER 10 MG: Performed by: INTERNAL MEDICINE

## 2025-08-20 PROCEDURE — 1160F RVW MEDS BY RX/DR IN RCRD: CPT | Performed by: NURSE PRACTITIONER

## 2025-08-20 PROCEDURE — 81003 URINALYSIS AUTO W/O SCOPE: CPT | Performed by: INTERNAL MEDICINE

## 2025-08-20 PROCEDURE — 80053 COMPREHEN METABOLIC PANEL: CPT | Performed by: INTERNAL MEDICINE

## 2025-08-20 PROCEDURE — 63710000001 POTASSIUM CHLORIDE 20 MEQ TABLET CONTROLLED-RELEASE: Performed by: NURSE PRACTITIONER

## 2025-08-20 PROCEDURE — 25810000003 SODIUM CHLORIDE 0.9 % SOLUTION: Performed by: INTERNAL MEDICINE

## 2025-08-20 PROCEDURE — 85025 COMPLETE CBC W/AUTO DIFF WBC: CPT | Performed by: INTERNAL MEDICINE

## 2025-08-20 PROCEDURE — 96413 CHEMO IV INFUSION 1 HR: CPT

## 2025-08-20 PROCEDURE — 1125F AMNT PAIN NOTED PAIN PRSNT: CPT | Performed by: NURSE PRACTITIONER

## 2025-08-20 RX ORDER — POTASSIUM CHLORIDE 1500 MG/1
40 TABLET, EXTENDED RELEASE ORAL ONCE
Status: COMPLETED | OUTPATIENT
Start: 2025-08-20 | End: 2025-08-20

## 2025-08-20 RX ORDER — SODIUM CHLORIDE 9 MG/ML
20 INJECTION, SOLUTION INTRAVENOUS ONCE
Status: COMPLETED | OUTPATIENT
Start: 2025-08-20 | End: 2025-08-20

## 2025-08-20 RX ADMIN — SODIUM CHLORIDE 20 ML/HR: 9 INJECTION, SOLUTION INTRAVENOUS at 14:10

## 2025-08-20 RX ADMIN — BEVACIZUMAB 800 MG: 400 INJECTION, SOLUTION INTRAVENOUS at 14:09

## 2025-08-20 RX ADMIN — POTASSIUM CHLORIDE 40 MEQ: 1500 TABLET, EXTENDED RELEASE ORAL at 14:21

## (undated) DEVICE — TUBING, SUCTION, 1/4" X 20', STRAIGHT: Brand: MEDLINE INDUSTRIES, INC.

## (undated) DEVICE — Device: Brand: DEFENDO AIR/WATER/SUCTION AND BIOPSY VALVE

## (undated) DEVICE — THE BITE BLOCK MAXI, LATEX FREE STRAP IS USED TO PROTECT THE ENDOSCOPE INSERTION TUBE FROM BEING BITTEN BY THE PATIENT.

## (undated) DEVICE — Device

## (undated) DEVICE — SINGLE PORT MANIFOLD: Brand: NEPTUNE 2